# Patient Record
Sex: FEMALE | Race: WHITE | Employment: FULL TIME | URBAN - METROPOLITAN AREA
[De-identification: names, ages, dates, MRNs, and addresses within clinical notes are randomized per-mention and may not be internally consistent; named-entity substitution may affect disease eponyms.]

---

## 2017-01-04 ENCOUNTER — OFFICE VISIT (OUTPATIENT)
Dept: DERMATOLOGY | Facility: CLINIC | Age: 32
End: 2017-01-04

## 2017-01-04 VITALS — DIASTOLIC BLOOD PRESSURE: 79 MMHG | HEART RATE: 73 BPM | SYSTOLIC BLOOD PRESSURE: 119 MMHG

## 2017-01-04 DIAGNOSIS — L70.0 ACNE VULGARIS: Primary | ICD-10-CM

## 2017-01-04 RX ORDER — TRETINOIN 0.25 MG/G
CREAM TOPICAL
Qty: 45 G | Refills: 11 | Status: SHIPPED | OUTPATIENT
Start: 2017-01-04 | End: 2017-10-03

## 2017-01-04 RX ORDER — SPIRONOLACTONE 25 MG/1
50 TABLET ORAL DAILY
Qty: 60 TABLET | Refills: 2 | Status: SHIPPED | OUTPATIENT
Start: 2017-01-04 | End: 2017-01-31

## 2017-01-04 ASSESSMENT — PAIN SCALES - GENERAL: PAINLEVEL: NO PAIN (0)

## 2017-01-04 NOTE — PROGRESS NOTES
[facility] Dermatology Note    Dermatology Problem List:  1. Atypical nevi - right superior breast, right medial thigh s/p biopsy 3/31/15  2. Family history of melanoma in the patient's mother  3. Acne vulgaris  -tretinoin 0.025% cream, spironolactone 25 MG - initiated 10/4/16, azelaic Acid 15% gel    CC:   Chief Complaint   Patient presents with     Derm Problem     Acne. Notes improvement with the spironolactone.     Date of Service: Jan 4, 2017    History of Present Illness:  Ms. Quiana Shepherd is a 31 year old female with a family history of melanoma in her mother presents for a follow up for acne. The patient was last seen on 10/4/16 when she started tretinoin 0.025% cream, spironolactone 25 mg, and continued azelaic acid 15% gel. Today the patient reports that there has been improvement with her acne due to the spironolactone. She also reports the tretinoin 0.025% cream is going well. She did have some red irritation, but it is manageable with facial moisturizer. She is currently having a good week and notes that the acne is improving with very minimal deep lesions on the central forehead, but believes that it could be better. She notes that she traveled to [place] over the holidays and got very sick with gastroenteritis. She states that she probably should have stopped the spironolactone, but she did not. She denies any breast tenderness or intermenstrual spotting. She notes that sometimes she feels a little light headedness, but notes that it is probably due to not taking in a lot of fluids. The patient will be traveling to [place] in a few weeks and will be scuba diving. She has a few questions about medication effects of this. The patient reports no other lesions of concern at this time.    Otherwise, the patient reports no painful, bleeding, nonhealing, or pruritic lesions, and denies new or changing moles.    Past Medical History:   Patient Active Problem List   Diagnosis     Family  history of melanoma     History reviewed. No pertinent past medical history.    Past Surgical History   Procedure Laterality Date     Mohs micrographic procedure       Social History:  The patient admits to using tanning beds when she was younger. 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    Family History:  There is a family history of melanoma in the patient's mother. Most of the melanoma in the patient's mother has been found within the last few years.    Medications:  Current Outpatient Prescriptions   Medication Sig Dispense Refill     tretinoin (RETIN-A) 0.025 % cream Apply a pea sized amount to a clean and dry face nightly. 45 g 3     spironolactone (ALDACTONE) 25 MG tablet Take 2 tablets (50 mg) by mouth daily 60 tablet 2     Azelaic Acid 15 % gel Apply 0.5 inches topically 2 times daily Massage thin film gently into afected areas morning and evening. 50 g 11     levonorgestrel (MIRENA) 20 MCG/24HR IUD 1 each by Intrauterine route once       Allergies:  No Known Allergies    Review of Systems:  - Skin: As above in HPI. No additional skin concerns.    Physical exam:  Vitals: There were no vitals taken for this visit.  GEN: This is a well developed, well-nourished female in no acute distress, in a pleasant mood.      SKIN: Acne exam, which includes the face, neck, upper central chest, and upper central back was performed.  - Forehead is clear  - Shallow smooth sided scars on the bilateral cheeks and chin, no comedones are noted  - Post inflammatory macules and one resolving inflammatory papules on the right cheek  - Chin has a solitary inflammatory papule and small resolving comedones  - Upper chest and upper back is clear  - No other lesions of concern on areas examined.     Impression/Plan:  1. Family history of melanoma in the patient's mother.    2. Personal history of dysplastic nevi    3. Acne vulgaris, comedonal and inflammatory with hormonal accentuation  - Continue tretinoin 0.025% cream - apply a pea  size amount to a clean and dry face nightly.   - Continue spironolactone 25 MG - take 2 tablets (50mg) by mouth daily.   - Continue azelaic acid 15% gel - apply 0.5 inches topically 2 times daily  - Continue benzoyl peroxide face wash  - Consider increasing tretinoin strength and/or spironolactone dose if no improvement is noted, the patient is feeling better in a few weeks, and the weather becomes more humid.        Follow-up in 3 months, earlier for new or chaning lesions..     Staff Involved:  Staff Only    Scribe Disclosure:   I, Reyna Abebe, am serving as a scribe to document services personally performed by Dr. Paolo Arenas, based on data collection and the provider's statements to me.

## 2017-01-04 NOTE — Clinical Note
1/4/2017       RE: Quiana Shepherd  [address] [place] [id number]     Dear Colleague,    Thank you for referring your patient, Quiana Shepherd, to the [facility] DERMATOLOGY at [facility]. Please see a copy of my visit note below.    [facility] Dermatology Note    Dermatology Problem List:  1. Atypical nevi - right superior breast, right medial thigh s/p biopsy 3/31/15  2. Family history of melanoma in the patient's mother  3. Acne vulgaris  -tretinoin 0.025% cream, spironolactone 25 MG - initiated 10/4/16, azelaic Acid 15% gel    CC:   Chief Complaint   Patient presents with     Derm Problem     Acne. Notes improvement with the spironolactone.     Date of Service: Jan 4, 2017    History of Present Illness:  Ms. Quiana Shepherd is a 31 year old female with a family history of melanoma in her mother presents for a follow up for acne. The patient was last seen on 10/4/16 when she started tretinoin 0.025% cream, spironolactone 25 mg, and continued azelaic acid 15% gel. Today the patient reports that there has been improvement with her acne due to the spironolactone. She also reports the tretinoin 0.025% cream is going well. She did have some red irritation, but it is manageable with facial moisturizer. She is currently having a good week and notes that the acne is improving with very minimal deep lesions on the central forehead, but believes that it could be better. She notes that she traveled to [place] over the holidays and got very sick with gastroenteritis. She states that she probably should have stopped the spironolactone, but she did not. She denies any breast tenderness or intermenstrual spotting. She notes that sometimes she feels a little light headedness, but notes that it is probably due to not taking in a lot of fluids. The patient will be traveling to [place] in a few weeks and will be scuba diving. She has a few questions about medication effects of  this. The patient reports no other lesions of concern at this time.    Otherwise, the patient reports no painful, bleeding, nonhealing, or pruritic lesions, and denies new or changing moles.    Past Medical History:   Patient Active Problem List   Diagnosis     Family history of melanoma     History reviewed. No pertinent past medical history.    Past Surgical History   Procedure Laterality Date     Mohs micrographic procedure       Social History:  The patient admits to using tanning beds when she was younger. 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    Family History:  There is a family history of melanoma in the patient's mother. Most of the melanoma in the patient's mother has been found within the last few years.    Medications:  Current Outpatient Prescriptions   Medication Sig Dispense Refill     tretinoin (RETIN-A) 0.025 % cream Apply a pea sized amount to a clean and dry face nightly. 45 g 3     spironolactone (ALDACTONE) 25 MG tablet Take 2 tablets (50 mg) by mouth daily 60 tablet 2     Azelaic Acid 15 % gel Apply 0.5 inches topically 2 times daily Massage thin film gently into afected areas morning and evening. 50 g 11     levonorgestrel (MIRENA) 20 MCG/24HR IUD 1 each by Intrauterine route once       Allergies:  No Known Allergies    Review of Systems:  - Skin: As above in HPI. No additional skin concerns.    Physical exam:  Vitals: There were no vitals taken for this visit.  GEN: This is a well developed, well-nourished female in no acute distress, in a pleasant mood.      SKIN: Acne exam, which includes the face, neck, upper central chest, and upper central back was performed.  - Forehead is clear  - Shallow smooth sided scars on the bilateral cheeks and chin, no comedones are noted  - Post inflammatory macules and one resolving inflammatory papules on the right cheek  - Chin has a solitary inflammatory papule and small resolving comedones  - Upper chest and upper back is clear  - No other lesions of  concern on areas examined.     Impression/Plan:  1. Family history of melanoma in the patient's mother.    2. Personal history of dysplastic nevi    3. Acne vulgaris, comedonal and inflammatory with hormonal accentuation  - Continue tretinoin 0.025% cream - apply a pea size amount to a clean and dry face nightly.   - Continue spironolactone 25 MG - take 2 tablets (50mg) by mouth daily.   - Continue azelaic acid 15% gel - apply 0.5 inches topically 2 times daily  - Continue benzoyl peroxide face wash  - Consider increasing tretinoin strength and/or spironolactone dose if no improvement is noted, the patient is feeling better in a few weeks, and the weather becomes more humid.        Follow-up in 3 months, earlier for new or chaning lesions..     Staff Involved:  Staff Only    Scribe Disclosure:   I, Reyna Abebe, am serving as a scribe to document services personally performed by Dr. Paolo Arenas, based on data collection and the provider's statements to me.     Again, thank you for allowing me to participate in the care of your patient.      Sincerely,    Paolo Arenas MD

## 2017-01-04 NOTE — NURSING NOTE
Dermatology Rooming Note    Quiana Shepherd's goals for this visit include:   Chief Complaint   Patient presents with     Derm Problem     Acne. Notes improvement with the spironolactone.     Lety Gan, CMA

## 2017-01-30 ENCOUNTER — MYC MEDICAL ADVICE (OUTPATIENT)
Dept: DERMATOLOGY | Facility: CLINIC | Age: 32
End: 2017-01-30

## 2017-01-30 DIAGNOSIS — L70.0 ACNE VULGARIS: Primary | ICD-10-CM

## 2017-01-31 RX ORDER — SPIRONOLACTONE 25 MG/1
100 TABLET ORAL DAILY
Qty: 120 TABLET | Refills: 2 | Status: SHIPPED | OUTPATIENT
Start: 2017-01-31 | End: 2017-04-04

## 2017-04-04 ENCOUNTER — OFFICE VISIT (OUTPATIENT)
Dept: DERMATOLOGY | Facility: CLINIC | Age: 32
End: 2017-04-04

## 2017-04-04 VITALS — SYSTOLIC BLOOD PRESSURE: 123 MMHG | HEART RATE: 85 BPM | DIASTOLIC BLOOD PRESSURE: 86 MMHG

## 2017-04-04 DIAGNOSIS — L70.0 ACNE VULGARIS: ICD-10-CM

## 2017-04-04 RX ORDER — SPIRONOLACTONE 25 MG/1
100 TABLET ORAL DAILY
Qty: 120 TABLET | Refills: 5 | Status: SHIPPED | OUTPATIENT
Start: 2017-04-04 | End: 2018-10-26 | Stop reason: DRUGHIGH

## 2017-04-04 ASSESSMENT — PAIN SCALES - GENERAL: PAINLEVEL: NO PAIN (0)

## 2017-04-04 NOTE — MR AVS SNAPSHOT
After Visit Summary   4/4/2017    Quiana Shepherd    MRN: [id number]           Patient Information     Date Of Birth          1985        Visit Information        Provider Department      4/4/2017 8:30 AM Paolo Arenas MD [facility] Dermatology        Today's Diagnoses     Acne vulgaris          Care Instructions    Avoid Bactrim when on spironolactone.         Follow-ups after your visit        Who to contact     Please call your clinic at [phone] to:    Ask questions about your health    Make or cancel appointments    Discuss your medicines    Learn about your test results    Speak to your doctor   If you have compliments or concerns about an experience at your clinic, or if you wish to file a complaint, please contact [facility] Patient Relations at [phone] or email us at [email or web address]         Additional Information About Your Visit        MyChart Information     [employer] gives you secure access to your electronic health record. If you see a primary care provider, you can also send messages to your care team and make appointments. If you have questions, please call your primary care clinic.  If you do not have a primary care provider, please call [phone] and they will assist you.      [employer] is an electronic gateway that provides easy, online access to your medical records. With [employer], you can request a clinic appointment, read your test results, renew a prescription or communicate with your care team.     To access your existing account, please contact your [facility] or call [phone] for assistance.        Care EveryWhere ID     This is your Care EveryWhere ID. This could be used by other organizations to access your [place] medical records  [id number]        Your Vitals Were     Pulse                   85            Blood Pressure from Last 3 Encounters:   No data found for BP    Weight from  Last 3 Encounters:   No data found for Wt              Today, you had the following     No orders found for display         Where to get your medicines      These medications were sent to [facility] [place] [facility] [place], [place] - [address]  [address], [facility] [id number]    Hours:  TRANSPLANT PHONE NUMBER [phone] Phone:  [phone]     spironolactone 25 MG tablet          Primary Care Provider    No Pcp Confirmed       No address on file        Thank you!     Thank you for choosing [facility] DERMATOLOGY  for your care. Our goal is always to provide you with excellent care. Hearing back from our patients is one way we can continue to improve our services. Please take a few minutes to complete the written survey that you may receive in the mail after your visit with us. Thank you!             Your Updated Medication List - Protect others around you: Learn how to safely use, store and throw away your medicines at www.disposemymeds.org.          This list is accurate as of: 4/4/17 11:59 PM.  Always use your most recent med list.                   Brand Name Dispense Instructions for use    Azelaic Acid 15 % gel     50 g    Apply 0.5 inches topically 2 times daily Massage thin film gently into afected areas morning and evening.       levonorgestrel 20 MCG/24HR IUD    MIRENA     1 each by Intrauterine route once       spironolactone 25 MG tablet    ALDACTONE    120 tablet    Take 4 tablets (100 mg) by mouth daily       tretinoin 0.025 % cream    RETIN-A    45 g    Apply a pea sized amount to a clean and dry face nightly.

## 2017-04-04 NOTE — LETTER
4/4/2017       RE: Quiana Shepherd  [address] [place] [id number]     Dear Colleague,    Thank you for referring your patient, Quiana Shepherd, to the [facility] DERMATOLOGY at [facility]. Please see a copy of my visit note below.    [facility] Dermatology Note    Dermatology Problem List:  1. Atypical nevi - right superior breast, right medial thigh s/p biopsy 3/31/15  2. Family history of melanoma in the patient's mother  3. Acne vulgaris  -tretinoin 0.025% cream, spironolactone, azelaic acid 15% gel, BPO facial wash    CC:   Chief Complaint   Patient presents with     Derm Problem     Acne. Quiana notes improvement with her acne. She notes that she is still having in-grown hairs.     Date of Service: Apr 4, 2017    History of Present Illness:  Ms. Quiana Shepherd is a 31 year old female with a family history of melanoma in her mother presents for a follow up for acne. The patient was last seen on 1/4/17 when she continued spironolactone, azelaic acid 15% gel, and benzoyl peroxide face wash. Today the patient reports that her acne has improved greatly since the last visit. She had one larger acne lesion on the ride side of her face and she did have another in grown hair on her chin in the same area as she had one in the past. She denies light headedness, spotting in between intramenstrual cycle, or breast tenderness. She denies any dryness or redness with the topical medications. The patient reports no other lesions of concern at this time.    Otherwise, the patient reports no painful, bleeding, nonhealing, or pruritic lesions, and denies new or changing moles.    Past Medical History:   Patient Active Problem List   Diagnosis     Family history of melanoma     No past medical history on file.    Past Surgical History:   Procedure Laterality Date     MOHS MICROGRAPHIC PROCEDURE       Social History:  The patient admits to using tanning beds when she was  younger. The patient works in behavior counseling.    Family History:  There is a family history of melanoma in the patient's mother. Most of the melanoma in the patient's mother has been found within the last few years.    Medications:  Current Outpatient Prescriptions   Medication Sig Dispense Refill     spironolactone (ALDACTONE) 25 MG tablet Take 4 tablets (100 mg) by mouth daily 120 tablet 2     tretinoin (RETIN-A) 0.025 % cream Apply a pea sized amount to a clean and dry face nightly. 45 g 11     Azelaic Acid 15 % gel Apply 0.5 inches topically 2 times daily Massage thin film gently into afected areas morning and evening. 50 g 11     levonorgestrel (MIRENA) 20 MCG/24HR IUD 1 each by Intrauterine route once       Allergies:  No Known Allergies    Review of Systems:  - Skin: As above in HPI. No additional skin concerns.    Physical exam:  Vitals: /86 (BP Location: Right arm)  Pulse 85  GEN: This is a well developed, well-nourished female in no acute distress, in a pleasant mood.      SKIN: Acne exam, which includes the face, neck, upper central chest, and upper central back was performed.  - Resolving inflammatory papule on the right preauricular cheek  - One scar on right jaw line  - One resolving inflammatory papule on the left chin  - Upper chest and back are clear  - No other lesions of concern on areas examined.     Impression/Plan:  1. Family history of melanoma in the patient's mother.    2. Personal history of atypical nevi.     3. Acne vulgaris, comedonal and inflammatory with hormonal accentuation  - Continue tretinoin 0.025% cream - apply a pea size amount to a clean and dry face nightly.   - Continue spironolactone - take 100 mg by mouth daily. Discussion that if the patient becomes pregnant to stop the medication. Discussion to not take Bactrim when on this medication.   - Continue azelaic acid 15% gel - apply 0.5 inches topically 2 times daily  - Continue benzoyl peroxide face  wash      Follow-up in 6 months, earlier for new or chaning lesions..     Staff Involved:  Staff Only    Scribe Disclosure:   I, Reyna Abebe, am serving as a scribe to document services personally performed by Dr. Paolo Arenas, based on data collection and the provider's statements to me.     Again, thank you for allowing me to participate in the care of your patient.      Sincerely,    Paolo Arenas MD

## 2017-04-04 NOTE — NURSING NOTE
Dermatology Rooming Note    Quiana Cora's goals for this visit include:   Chief Complaint   Patient presents with     Derm Problem     Acne. Quiana notes improvement with her acne. She notes that she is still having in-grown hairs.     Lety Gan, CMA

## 2017-08-19 ENCOUNTER — HEALTH MAINTENANCE LETTER (OUTPATIENT)
Age: 32
End: 2017-08-19

## 2017-10-03 ENCOUNTER — OFFICE VISIT (OUTPATIENT)
Dept: DERMATOLOGY | Facility: CLINIC | Age: 32
End: 2017-10-03

## 2017-10-03 DIAGNOSIS — L70.0 ACNE VULGARIS: ICD-10-CM

## 2017-10-03 RX ORDER — AZELAIC ACID 0.15 G/G
0.5 GEL TOPICAL 2 TIMES DAILY
Qty: 50 G | Refills: 11 | Status: SHIPPED | OUTPATIENT
Start: 2017-10-03 | End: 2019-07-29

## 2017-10-03 RX ORDER — SPIRONOLACTONE 50 MG/1
TABLET, FILM COATED ORAL
Qty: 180 TABLET | Refills: 3 | Status: SHIPPED | OUTPATIENT
Start: 2017-10-03 | End: 2018-03-30

## 2017-10-03 RX ORDER — TRETINOIN 0.25 MG/G
CREAM TOPICAL
Qty: 45 G | Refills: 11 | Status: SHIPPED | OUTPATIENT
Start: 2017-10-03 | End: 2018-10-26 | Stop reason: DRUGHIGH

## 2017-10-03 RX ORDER — SPIRONOLACTONE 25 MG/1
100 TABLET ORAL DAILY
Qty: 120 TABLET | Refills: 5 | Status: CANCELLED | OUTPATIENT
Start: 2017-10-03

## 2017-10-03 ASSESSMENT — PAIN SCALES - GENERAL: PAINLEVEL: NO PAIN (0)

## 2017-10-03 NOTE — LETTER
10/3/2017       RE: Quiana Shepherd  [address] [place] [id number]     Dear Colleague,    Thank you for referring your patient, Quiana Shepherd, to the [facility] Please see a copy of my visit note below.    CHIEF COMPLAINT:  Follow up on acne.      HISTORY OF PRESENT ILLNESS:  Quiana is a very pleasant 32-year-old female who has a longstanding history of acne vulgaris with a hormonal component.  She has been using the Mirena IUD for the last 6 years and noticed her acne flare up significantly approximately 1 year ago.  She last saw Dr. Paolo Arenas in our clinic on 04/04/2017, at which time she continued spironolactone 100 mg daily as well as tretinoin and azelaic acid.  Today, she reports that her acne is very well-controlled, but she continues to have increased terminal hair growth on her chin which is modestly but not completely improved by spironolactone.  She is wondering if there is anything additional she can do for this.      REVIEW OF SYSTEMS:  No recent fevers or chills.  No breast tenderness or significant muscle cramping on spironolactone.      PHYSICAL EXAMINATION:   GENERAL:  This is a well-appearing, well-nourished female with a normal mood and affect who is oriented x3.   SKIN:  A cutaneous exam of the head, neck and bilateral upper extremities was performed.  On the mid cheek, there is a firm, flesh-toned, 3 mm dermal to subcutaneous papule which on dermoscopy does not demonstrate features of sebaceous hyperplasia.  Rare pink acneiform 2 mm papules are also present on her cheeks.      ASSESSMENT AND PLAN:   1.  Acne vulgaris with a hormonal component (likely exacerbated by Mirena IUD).  Today we will increase her spironolactone to 150 mg daily, primarily due to her concerns regarding terminal hair growth.  We discussed potential side effects of spironolactone, the importance of not getting pregnant while taking it and that she can  go back down to 100 mg daily if she is noticing side effects.  She will also continue tretinoin 0.025% cream at bedtime as well as azelaic acid 15% gel in the morning.   2.  Mild increased terminal hair growth on the chin.  As noted above, she will increase the spironolactone to 150 mg daily.   3.  She will follow up in our clinic in 3 months' time.       Cyrus Posey MD  Dermatology Attending

## 2017-10-03 NOTE — MR AVS SNAPSHOT
After Visit Summary   10/3/2017    Quiana Shepherd    MRN: [id number]           Patient Information     Date Of Birth          1985        Visit Information        Provider Department      10/3/2017 4:30 PM Cyrus Posey MD [facility] Dermatology        Today's Diagnoses     Acne vulgaris           Follow-ups after your visit        Who to contact     Please call your clinic at [phone] to:    Ask questions about your health    Make or cancel appointments    Discuss your medicines    Learn about your test results    Speak to your doctor   If you have compliments or concerns about an experience at your clinic, or if you wish to file a complaint, please contact [facility] Patient Relations at [phone] or email us at [email or web address]         Additional Information About Your Visit        MyChart Information     [employer] gives you secure access to your electronic health record. If you see a primary care provider, you can also send messages to your care team and make appointments. If you have questions, please call your primary care clinic.  If you do not have a primary care provider, please call [phone] and they will assist you.      [employer] is an electronic gateway that provides easy, online access to your medical records. With [employer], you can request a clinic appointment, read your test results, renew a prescription or communicate with your care team.     To access your existing account, please contact your [facility] or call [phone] for assistance.        Care EveryWhere ID     This is your Care EveryWhere ID. This could be used by other organizations to access your [place] medical records  [id number]         Blood Pressure from Last 3 Encounters:   04/04/17 123/86   01/04/17 119/79    Weight from Last 3 Encounters:   No data found for Wt              Today, you had the following     No orders found for  display         Today's Medication Changes          These changes are accurate as of: 10/3/17 11:59 PM.  If you have any questions, ask your nurse or doctor.               These medicines have changed or have updated prescriptions.        Dose/Directions    * spironolactone 25 MG tablet   Commonly known as:  ALDACTONE   This may have changed:  Another medication with the same name was added. Make sure you understand how and when to take each.   Used for:  Acne vulgaris   Changed by:  Paolo Arenas MD        Dose:  100 mg   Take 4 tablets (100 mg) by mouth daily   Quantity:  120 tablet   Refills:  5       * spironolactone 50 MG tablet   Commonly known as:  ALDACTONE   This may have changed:  You were already taking a medication with the same name, and this prescription was added. Make sure you understand how and when to take each.   Used for:  Acne vulgaris   Changed by:  Cyrus Posey MD        3 pills once a day   Quantity:  180 tablet   Refills:  3       * Notice:  This list has 2 medication(s) that are the same as other medications prescribed for you. Read the directions carefully, and ask your doctor or other care provider to review them with you.         Where to get your medicines      These medications were sent to [address] [address]    Hours:  TRANSPLANT PHONE NUMBER [phone] Phone:  [phone]     Azelaic Acid 15 % gel    spironolactone 50 MG tablet    tretinoin 0.025 % cream                Primary Care Provider    None Specified       No primary provider on file.        Equal Access to Services     REINA SPEARS : Dmitri Dyer, eladia martell, qarishabh kaaldevin pickering. So [facility] [phone].    ATENCIÓN: Si habla español, tiene a prakash disposición servicios gratuitos de asistencia lingüística. Llame al [phone].    We  comply with applicable federal civil rights laws and [place] laws. We do not discriminate on the basis of race, color, national origin, age, disability, sex, sexual orientation, or gender identity.            Thank you!     Thank you for choosing [facility] DERMATOLOGY  for your care. Our goal is always to provide you with excellent care. Hearing back from our patients is one way we can continue to improve our services. Please take a few minutes to complete the written survey that you may receive in the mail after your visit with us. Thank you!             Your Updated Medication List - Protect others around you: Learn how to safely use, store and throw away your medicines at www.disposemymeds.org.          This list is accurate as of: 10/3/17 11:59 PM.  Always use your most recent med list.                   Brand Name Dispense Instructions for use Diagnosis    Azelaic Acid 15 % gel     50 g    Apply 0.5 inches topically 2 times daily Massage thin film gently into afected areas morning and evening.    Acne vulgaris       levonorgestrel 20 MCG/24HR IUD    MIRENA     1 each by Intrauterine route once        * spironolactone 25 MG tablet    ALDACTONE    120 tablet    Take 4 tablets (100 mg) by mouth daily    Acne vulgaris       * spironolactone 50 MG tablet    ALDACTONE    180 tablet    3 pills once a day    Acne vulgaris       tretinoin 0.025 % cream    RETIN-A    45 g    Apply a pea sized amount to a clean and dry face nightly.    Acne vulgaris       * Notice:  This list has 2 medication(s) that are the same as other medications prescribed for you. Read the directions carefully, and ask your doctor or other care provider to review them with you.

## 2017-10-03 NOTE — NURSING NOTE
Dermatology Rooming Note    Quianateagan Shepherd's goals for this visit include:   Chief Complaint   Patient presents with     Derm Problem     Acne. Quiana states that her acne is doing well since her last visit.     Lety Gan, CMA

## 2017-10-03 NOTE — PROGRESS NOTES
CHIEF COMPLAINT:  Follow up on acne.      HISTORY OF PRESENT ILLNESS:  Quiana is a very pleasant 32-year-old female who has a longstanding history of acne vulgaris with a hormonal component.  She has been using the Mirena IUD for the last 6 years and noticed her acne flare up significantly approximately 1 year ago.  She last saw Dr. Paolo Arenas in our clinic on 04/04/2017, at which time she continued spironolactone 100 mg daily as well as tretinoin and azelaic acid.  Today, she reports that her acne is very well-controlled, but she continues to have increased terminal hair growth on her chin which is modestly but not completely improved by spironolactone.  She is wondering if there is anything additional she can do for this.      REVIEW OF SYSTEMS:  No recent fevers or chills.  No breast tenderness or significant muscle cramping on spironolactone.      PHYSICAL EXAMINATION:   GENERAL:  This is a well-appearing, well-nourished female with a normal mood and affect who is oriented x3.   SKIN:  A cutaneous exam of the head, neck and bilateral upper extremities was performed.  On the mid cheek, there is a firm, flesh-toned, 3 mm dermal to subcutaneous papule which on dermoscopy does not demonstrate features of sebaceous hyperplasia.  Rare pink acneiform 2 mm papules are also present on her cheeks.      ASSESSMENT AND PLAN:   1.  Acne vulgaris with a hormonal component (likely exacerbated by Mirena IUD).  Today we will increase her spironolactone to 150 mg daily, primarily due to her concerns regarding terminal hair growth.  We discussed potential side effects of spironolactone, the importance of not getting pregnant while taking it and that she can go back down to 100 mg daily if she is noticing side effects.  She will also continue tretinoin 0.025% cream at bedtime as well as azelaic acid 15% gel in the morning.   2.  Mild increased terminal hair growth on the chin.  As noted above, she will increase the spironolactone  to 150 mg daily.   3.  She will follow up in our clinic in 3 months' time.       Cyrus Posey MD  Dermatology Attending

## 2017-10-26 PROBLEM — L70.0 ACNE VULGARIS: Status: ACTIVE | Noted: 2017-10-26

## 2018-03-30 ENCOUNTER — OFFICE VISIT (OUTPATIENT)
Dept: DERMATOLOGY | Facility: CLINIC | Age: 33
End: 2018-03-30
Payer: COMMERCIAL

## 2018-03-30 VITALS — DIASTOLIC BLOOD PRESSURE: 70 MMHG | SYSTOLIC BLOOD PRESSURE: 118 MMHG

## 2018-03-30 DIAGNOSIS — D48.5 NEOPLASM OF UNCERTAIN BEHAVIOR OF SKIN: Primary | ICD-10-CM

## 2018-03-30 DIAGNOSIS — L70.0 ACNE VULGARIS: ICD-10-CM

## 2018-03-30 RX ORDER — SPIRONOLACTONE 50 MG/1
TABLET, FILM COATED ORAL
Qty: 180 TABLET | Refills: 3 | Status: SHIPPED | OUTPATIENT
Start: 2018-03-30 | End: 2018-10-26 | Stop reason: DRUGHIGH

## 2018-03-30 ASSESSMENT — PAIN SCALES - GENERAL
PAINLEVEL: NO PAIN (0)
PAINLEVEL: MILD PAIN (2)

## 2018-03-30 NOTE — MR AVS SNAPSHOT
After Visit Summary   3/30/2018    Quiana Shepherd    MRN: [id number]           Patient Information     Date Of Birth          1985        Visit Information        Provider Department      3/30/2018 8:30 AM Delfina Rowley PA-C [facility] Dermatology        Today's Diagnoses     Neoplasm of uncertain behavior of skin    -  1    Acne vulgaris          Care Instructions    Wound Care After a Biopsy    What is a skin biopsy?  A skin biopsy allows the doctor to examine a very small piece of tissue under the microscope to determine the diagnosis and the best treatment for the skin condition. A local anesthetic (numbing medicine)  is injected with a very small needle into the skin area to be tested. A small piece of skin is taken from the area. Sometimes a suture (stitch) is used.     What are the risks of a skin biopsy?  I will experience scar, bleeding, swelling, pain, crusting and redness. I may experience incomplete removal or recurrence. Risks of this procedure are excessive bleeding, bruising, infection, nerve damage, numbness, thick (hypertrophic or keloidal) scar and non-diagnostic biopsy.    How should I care for my wound for the first 24 hours?    Keep the wound dry and covered for 24 hours    If it bleeds, hold direct pressure on the area for 15 minutes. If bleeding does not stop then go to the emergency room    Avoid strenuous exercise the first 1-2 days or as your doctor instructs you    How should I care for the wound after 24 hours?    After 24 hours, remove the bandage    You may bathe or shower as normal    If you had a scalp biopsy, you can shampoo as usual and can use shower water to clean the biopsy site daily    Clean the wound twice a day with gentle soap and water    Do not scrub, be gentle    Apply white petroleum/Vaseline after cleaning the wound with a cotton swab or a clean finger, and keep the site covered with a Bandaid /bandage. Bandages are not necessary with a scalp  biopsy    If you are unable to cover the site with a Bandaid /bandage, re-apply ointment 2-3 times a day to keep the site moist. Moisture will help with healing    Avoid strenuous activity for first 1-2 days    Avoid lakes, rivers, pools, and oceans until the stitches are removed or the site is healed    How do I clean my wound?    Wash hands thoroughly with soap or use hand  before all wound care    Clean the wound with gentle soap and water    Apply white petroleum/Vaseline  to wound after it is clean    Replace the Bandaid /bandage to keep the wound covered for the first few days or as instructed by your doctor    If you had a scalp biopsy, warm shower water to the area on a daily basis should suffice    What should I use to clean my wound?     Cotton-tipped applicators (Qtips )    White petroleum jelly (Vaseline ). Use a clean new container and use Q-tips to apply.    Bandaids   as needed    Gentle soap     How should I care for my wound long term?    Do not get your wound dirty    Keep up with wound care for one week or until the area is healed.    A small scab will form and fall off by itself when the area is completely healed. The area will be red and will become pink in color as it heals. Sun protection is very important for how your scar will turn out. Sunscreen with an SPF 30 or greater is recommended once the area is healed.    You should have some soreness but it should be mild and slowly go away over several days. Talk to your doctor about using tylenol for pain,    When should I call my doctor?  If you have increased:     Pain or swelling    Pus or drainage (clear or slightly yellow drainage is ok)    Temperature over 100F    Spreading redness or warmth around wound    When will I hear about my results?  The biopsy results can take 2-3 weeks to come back. The clinic will call you with the results, send you a [employer] message, or have you schedule a follow-up clinic or phone time to discuss the  results. Contact our clinics if you do not hear from us in 3 weeks.     Who should I call with questions?    [facility]: [phone]     [facility]: [phone]    For urgent needs outside of business hours call the [facility] at [phone] and ask for the dermatology resident on call              Follow-ups after your visit        Follow-up notes from your care team     Return in about 6 months (around 9/30/2018).      Who to contact     Please call your clinic at [phone] to:    Ask questions about your health    Make or cancel appointments    Discuss your medicines    Learn about your test results    Speak to your doctor            Additional Information About Your Visit        [employer] Information     [employer] gives you secure access to your electronic health record. If you see a primary care provider, you can also send messages to your care team and make appointments. If you have questions, please call your primary care clinic.  If you do not have a primary care provider, please call [phone] and they will assist you.      [employer] is an electronic gateway that provides easy, online access to your medical records. With [employer], you can request a clinic appointment, read your test results, renew a prescription or communicate with your care team.     To access your existing account, please contact your [facility] or call [phone] for assistance.        Care EveryWhere ID     This is your Care EveryWhere ID. This could be used by other organizations to access your [place] medical records  [id number]         Blood Pressure from Last 3 Encounters:   03/30/18 118/70   04/04/17 123/86   01/04/17 119/79    Weight from Last 3 Encounters:   No data found for Wt              We Performed the Following     BIOPSY SKIN/SUBQ/MUC MEM, SINGLE LESION     Dermatological path order and indications          Where  to get your medicines      These medications were sent to [place], [place] - [address]  [address], [facility] [address]    Hours:  TRANSPLANT PHONE NUMBER [phone] Phone:  [phone]     spironolactone 50 MG tablet          Primary Care Provider    None Specified       No primary provider on file.        Equal Access to Services     [facility]: Hadii aad ku hadasho Soomaali, waaxda luqadaha, qaybta kaalmada adeegyada, waxay idiin hayaan adelynn diegodelaneyflako lamario alberto . So [facility] [phone].    ATENCIÓN: Si habla español, tiene a prakash disposición servicios gratuitos de asistencia lingüística. Paola al [phone].    We comply with applicable federal civil rights laws and [place] laws. We do not discriminate on the basis of race, color, national origin, age, disability, sex, sexual orientation, or gender identity.            Thank you!     Thank you for choosing [facility] DERMATOLOGY  for your care. Our goal is always to provide you with excellent care. Hearing back from our patients is one way we can continue to improve our services. Please take a few minutes to complete the written survey that you may receive in the mail after your visit with us. Thank you!             Your Updated Medication List - Protect others around you: Learn how to safely use, store and throw away your medicines at www.disposemymeds.org.          This list is accurate as of 3/30/18  8:59 AM.  Always use your most recent med list.                   Brand Name Dispense Instructions for use Diagnosis    Azelaic Acid 15 % gel     50 g    Apply 0.5 inches topically 2 times daily Massage thin film gently into afected areas morning and evening.    Acne vulgaris       levonorgestrel 20 MCG/24HR IUD    MIRENA     1 each by Intrauterine route once        * spironolactone 25 MG tablet    ALDACTONE    120 tablet    Take 4 tablets (100 mg) by mouth daily    Acne vulgaris       *  spironolactone 50 MG tablet    ALDACTONE    180 tablet    3 pills once a day    Acne vulgaris       tretinoin 0.025 % cream    RETIN-A    45 g    Apply a pea sized amount to a clean and dry face nightly.    Acne vulgaris       * Notice:  This list has 2 medication(s) that are the same as other medications prescribed for you. Read the directions carefully, and ask your doctor or other care provider to review them with you.

## 2018-03-30 NOTE — LETTER
3/30/2018       RE: Quiana Shepherd  [address] [facility] [place] [address]     Dear Colleague,    Thank you for referring your patient, Quiana Shepherd, to the [facility] DERMATOLOGY at [facility]. Please see a copy of my visit note below.    [facility] Dermatology Note      Dermatology Problem List:  1.Acne Vulgaris with hormonal component and secondary hirsutism from use of spironolactone  -current tx: tretinoin 0.025% cream QHS, azelaic acid 15% gel BID, spironolactone 150mg po QD  2. NUB - right shoulder s/p bx 3/30/18    CC:   Chief Complaint   Patient presents with     Skin Check     Quiana is here today for a skin check- has a spot of concern.      Acne     Flaco would also like to discuss her acne.        Encounter Date: Mar 30, 2018    History of Present Illness:  Ms. Quiana Shepherd is a 32 year old female who presents for an acne follow-up. She has been on tretinoin 0.025% cream nightly, Azelaic acid 15% BID and spironolactone 150mg po QD. She is tolerating this regimen well and denies side effects to the medications including breast tenderness, spotting, dizziness, dryness. Of note, her dose was increased at her last office visit with Dr. Posey on 10/3/17 due to the fact she had some growth of dark hair on the chin. She feels like the amount of dark hairs has decreased since her last visit, but she is still getting some. She plucks them. She still has numerous fine blonde hairs in the area.    Additionally, she is here for a concerning mole which never was a concerning spot in past skin exams, but over the past few weeks it started scabbing up and now has become itchy and painful. She wears sunscreen on a regular basis. Patient burns easily. She has no personal hx of skin cancer. However she says her mother has had numerous melanomas. Her Mom is on Humira. The patient denies painful, itching, tingling or bleeding lesions unless otherwise noted.      Past  Medical History:   Patient Active Problem List   Diagnosis     Family history of melanoma     Acne vulgaris     Past Medical History:   Diagnosis Date     Acne vulgaris 10/26/2017     History reviewed. No pertinent surgical history.    Social History:   The patient admits to use of tanning beds as a teenager for about 2 years.    Family History:  Patient's mother has had numerous melanomas (mom is on Humira)  No fhx of NMSCs.     Medications:  Current Outpatient Prescriptions   Medication Sig Dispense Refill     tretinoin (RETIN-A) 0.025 % cream Apply a pea sized amount to a clean and dry face nightly. 45 g 11     Azelaic Acid 15 % gel Apply 0.5 inches topically 2 times daily Massage thin film gently into afected areas morning and evening. 50 g 11     spironolactone (ALDACTONE) 50 MG tablet 3 pills once a day 180 tablet 3     levonorgestrel (MIRENA) 20 MCG/24HR IUD 1 each by Intrauterine route once       spironolactone (ALDACTONE) 25 MG tablet Take 4 tablets (100 mg) by mouth daily (Patient not taking: Reported on 3/30/2018) 120 tablet 5     No Known Allergies      Review of Systems:  -Constitutional: The patient denies fatigue, fevers, chills, unintended weight loss, and night sweats.  -Skin: As above in HPI. No additional skin concerns.  -GI: The patient denies nausea, vomiting, diarrhea or abdominal pain.  -: No changes in menstrual cycle, no breast tenderness, no change in urination  -CVS: no palpitations, dizziness      Physical exam:  Vitals: /70 (Cuff Size: Adult Regular)  GEN: This is a well developed, well-nourished female in no acute distress, in a pleasant mood.    SKIN: Waist-up skin, which includes the head/face, neck, both arms, chest, back, abdomen, digits and/or nails was examined.  -Very few acne lesions on the face, no scarring  -scattered terminal dark hairs on the lower aspect of the shin, patient has some mild erythema 2/2 plucking  -3mm irritated pink papule on the right shoulder  -No  other lesions of concern on areas examined.     Impression/Plan:  1. Acne vulgaris with hormonal component & hurtsuism    Continue with spironolactone 150mg po daily. Refilled today. Reminded patient she should not get pregnant while on the medication. She is tolerating it well.     Contiune with tretinoin 0.025% cream QHS and azelaic acid 15% gel BID    Discussed side effect of hurtsuism and spironolactone, and patient elected to continue at this dose for now. She has seen improvement in her dark hair growth, just not resolution. Briefly discussed increasing her dose to 200mg daily, but she declined this at this time.    Briefly mentioned laser hair removal/electrolysis as an option, patient to consider       2. Neoplasm of uncertain behavior on the right shoulder. The differential diagnosis includes inflamed congenital nevus vs other.     Shave biopsy:  After discussion of benefits and risks including but not limited to bleeding/bruising, pain/swelling, infection, scar, incomplete removal, nerve damage/numbness, recurrence, and non-diagnostic biopsy, written consent, verbal consent and photographs were obtained. Time-out was performed. The area was cleaned with isopropyl alcohol.  was injected to obtain adequate anesthesia of the lesion on the right shoulder. 1ml of 1% lidocaine was injected to obtain adequate anesthesia. A shave biopsy was performed. Hemostasis was achieved with aluminium chloride. Vaseline and a sterile dressing were applied. The patient tolerated the procedure and no complications were noted. The patient was provided with verbal and written post care instructions.      CC Dr. Lomax on close of this encounter.  Follow-up in 6 months, earlier for new or changing lesions.       Staff Involved:  Staff Only  All risks, benefits and alternatives were discussed with patient.  Patient is in agreement and understands the assessment and plan.  All questions were answered.    Delfina Rowley  CAROLYNE  [facility]: Phone: [phone], Fax: [phone]

## 2018-03-30 NOTE — PROGRESS NOTES
[facility] Dermatology Note      Dermatology Problem List:  1.Acne Vulgaris with hormonal component and secondary hirsutism from use of spironolactone  -current tx: tretinoin 0.025% cream QHS, azelaic acid 15% gel BID, spironolactone 150mg po QD  2. NUB - right shoulder s/p bx 3/30/18    CC:   Chief Complaint   Patient presents with     Skin Check     Quiana is here today for a skin check- has a spot of concern.      Acne     Flaco would also like to discuss her acne.        Encounter Date: Mar 30, 2018    History of Present Illness:  Ms. Quiana Shepherd is a 32 year old female who presents for an acne follow-up. She has been on tretinoin 0.025% cream nightly, Azelaic acid 15% BID and spironolactone 150mg po QD. She is tolerating this regimen well and denies side effects to the medications including breast tenderness, spotting, dizziness, dryness. Of note, her dose was increased at her last office visit with Dr. Posey on 10/3/17 due to the fact she had some growth of dark hair on the chin. She feels like the amount of dark hairs has decreased since her last visit, but she is still getting some. She plucks them. She still has numerous fine blonde hairs in the area.    Additionally, she is here for a concerning mole which never was a concerning spot in past skin exams, but over the past few weeks it started scabbing up and now has become itchy and painful. She wears sunscreen on a regular basis. Patient burns easily. She has no personal hx of skin cancer. However she says her mother has had numerous melanomas. Her Mom is on Humira. The patient denies painful, itching, tingling or bleeding lesions unless otherwise noted.      Past Medical History:   Patient Active Problem List   Diagnosis     Family history of melanoma     Acne vulgaris     Past Medical History:   Diagnosis Date     Acne vulgaris 10/26/2017     History reviewed. No pertinent surgical history.    Social History:   The patient admits to  use of tanning beds as a teenager for about 2 years.    Family History:  Patient's mother has had numerous melanomas (mom is on Humira)  No fhx of NMSCs.     Medications:  Current Outpatient Prescriptions   Medication Sig Dispense Refill     tretinoin (RETIN-A) 0.025 % cream Apply a pea sized amount to a clean and dry face nightly. 45 g 11     Azelaic Acid 15 % gel Apply 0.5 inches topically 2 times daily Massage thin film gently into afected areas morning and evening. 50 g 11     spironolactone (ALDACTONE) 50 MG tablet 3 pills once a day 180 tablet 3     levonorgestrel (MIRENA) 20 MCG/24HR IUD 1 each by Intrauterine route once       spironolactone (ALDACTONE) 25 MG tablet Take 4 tablets (100 mg) by mouth daily (Patient not taking: Reported on 3/30/2018) 120 tablet 5     No Known Allergies      Review of Systems:  -Constitutional: The patient denies fatigue, fevers, chills, unintended weight loss, and night sweats.  -Skin: As above in HPI. No additional skin concerns.  -GI: The patient denies nausea, vomiting, diarrhea or abdominal pain.  -: No changes in menstrual cycle, no breast tenderness, no change in urination  -CVS: no palpitations, dizziness      Physical exam:  Vitals: /70 (Cuff Size: Adult Regular)  GEN: This is a well developed, well-nourished female in no acute distress, in a pleasant mood.    SKIN: Waist-up skin, which includes the head/face, neck, both arms, chest, back, abdomen, digits and/or nails was examined.  -Very few acne lesions on the face, no scarring  -scattered terminal dark hairs on the lower aspect of the shin, patient has some mild erythema 2/2 plucking  -3mm irritated pink papule on the right shoulder  -No other lesions of concern on areas examined.     Impression/Plan:  1. Acne vulgaris with hormonal component & hurtsuism    Continue with spironolactone 150mg po daily. Refilled today. Reminded patient she should not get pregnant while on the medication. She is tolerating it  well.     Contiune with tretinoin 0.025% cream QHS and azelaic acid 15% gel BID    Discussed side effect of hurtsuism and spironolactone, and patient elected to continue at this dose for now. She has seen improvement in her dark hair growth, just not resolution. Briefly discussed increasing her dose to 200mg daily, but she declined this at this time.    Briefly mentioned laser hair removal/electrolysis as an option, patient to consider       2. Neoplasm of uncertain behavior on the right shoulder. The differential diagnosis includes inflamed congenital nevus vs other.     Shave biopsy:  After discussion of benefits and risks including but not limited to bleeding/bruising, pain/swelling, infection, scar, incomplete removal, nerve damage/numbness, recurrence, and non-diagnostic biopsy, written consent, verbal consent and photographs were obtained. Time-out was performed. The area was cleaned with isopropyl alcohol.  was injected to obtain adequate anesthesia of the lesion on the right shoulder. 1ml of 1% lidocaine was injected to obtain adequate anesthesia. A shave biopsy was performed. Hemostasis was achieved with aluminium chloride. Vaseline and a sterile dressing were applied. The patient tolerated the procedure and no complications were noted. The patient was provided with verbal and written post care instructions.      CC Dr. Lomax on close of this encounter.  Follow-up in 6 months, earlier for new or changing lesions.       Staff Involved:  Staff Only  All risks, benefits and alternatives were discussed with patient.  Patient is in agreement and understands the assessment and plan.  All questions were answered.    Delfina Rowley PA-C  [facility]: Phone: [phone], Fax: [phone]

## 2018-03-30 NOTE — NURSING NOTE
Lidocaine 1%  0.5mL once for one use, starting 3/30/2018 ending 3/30/2018,  2mL disp, R-0, injection  Injected by Kim Norwood MA

## 2018-03-30 NOTE — NURSING NOTE
Dermatology Rooming Note    Quiana Shepherd's goals for this visit include:   Chief Complaint   Patient presents with     Skin Check     Quiana is here today for a skin check- has a spot of concern.      Acne     Flaco would also like to discuss her acne.      Kim Norwood MA

## 2018-03-30 NOTE — PATIENT INSTRUCTIONS

## 2018-04-02 LAB — COPATH REPORT: NORMAL

## 2018-04-03 ENCOUNTER — TELEPHONE (OUTPATIENT)
Dept: DERMATOLOGY | Facility: CLINIC | Age: 33
End: 2018-04-03

## 2018-04-03 NOTE — TELEPHONE ENCOUNTER
Spoke with carolyne and informed her of path results. Advised that path results where Benign. She stated that she understood and will call if she has additional questions.     Giovanna Mitchell

## 2018-06-07 ENCOUNTER — OFFICE VISIT (OUTPATIENT)
Dept: ORTHOPEDICS | Facility: CLINIC | Age: 33
End: 2018-06-07
Payer: COMMERCIAL

## 2018-06-07 ENCOUNTER — RADIANT APPOINTMENT (OUTPATIENT)
Dept: GENERAL RADIOLOGY | Facility: CLINIC | Age: 33
End: 2018-06-07
Attending: PREVENTIVE MEDICINE
Payer: COMMERCIAL

## 2018-06-07 VITALS — SYSTOLIC BLOOD PRESSURE: 123 MMHG | DIASTOLIC BLOOD PRESSURE: 91 MMHG | HEART RATE: 71 BPM | HEIGHT: 67 IN

## 2018-06-07 DIAGNOSIS — M79.601 RIGHT ARM PAIN: ICD-10-CM

## 2018-06-07 DIAGNOSIS — S52.124A CLOSED NONDISPLACED FRACTURE OF HEAD OF RIGHT RADIUS, INITIAL ENCOUNTER: ICD-10-CM

## 2018-06-07 DIAGNOSIS — M79.601 RIGHT ARM PAIN: Primary | ICD-10-CM

## 2018-06-07 LAB — RADIOLOGIST FLAGS: NORMAL

## 2018-06-07 RX ORDER — DICLOFENAC SODIUM 75 MG/1
75 TABLET, DELAYED RELEASE ORAL 2 TIMES DAILY PRN
Qty: 30 TABLET | Refills: 1 | Status: SHIPPED | OUTPATIENT
Start: 2018-06-07 | End: 2021-04-26
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
## 2018-06-07 NOTE — MR AVS SNAPSHOT
After Visit Summary   6/7/2018    Quiana Shepherd    MRN: [id number]           Patient Information     Date Of Birth          1985        Visit Information        Provider Department      6/7/2018 4:50 PM Nii Gomez MD [facility]        Today's Diagnoses     Right arm pain    -  1    Closed nondisplaced fracture of head of right radius, initial encounter           Follow-ups after your visit        Your next 10 appointments already scheduled     Jun 22, 2018 12:30 PM CDT   (Arrive by 12:15 PM)   Return Walk In Ortho with Nii Gomez MD   [facility] ([facility])    [address]  [address] [facility] [id number] [phone]              Who to contact     Please call your clinic at [phone] to:    Ask questions about your health    Make or cancel appointments    Discuss your medicines    Learn about your test results    Speak to your doctor            Additional Information About Your Visit        MyChart Information     [employer] gives you secure access to your electronic health record. If you see a primary care provider, you can also send messages to your care team and make appointments. If you have questions, please call your primary care clinic.  If you do not have a primary care provider, please call [phone] and they will assist you.      [employer] is an electronic gateway that provides easy, online access to your medical records. With [employer], you can request a clinic appointment, read your test results, renew a prescription or communicate with your care team.     To access your existing account, please contact your [facility] or call [phone] for assistance.        Care EveryWhere ID     This is your Care EveryWhere ID. This could be used by other organizations to access your [place] medical records  [id number]        Your Vitals Were      "Pulse Height                71 1.702 m (5' 7\")           Blood Pressure from Last 3 Encounters:   06/07/18 (!) 123/91   03/30/18 118/70   04/04/17 123/86    Weight from Last 3 Encounters:   No data found for Wt                 Today's Medication Changes          These changes are accurate as of 6/7/18  5:46 PM.  If you have any questions, ask your nurse or doctor.               Start taking these medicines.        Dose/Directions    diclofenac 75 MG EC tablet   Commonly known as:  VOLTAREN   Used for:  Right arm pain, Closed nondisplaced fracture of head of right radius, initial encounter   Started by:  Nii Gomez MD        Dose:  75 mg   Take 1 tablet (75 mg) by mouth 2 times daily as needed for moderate pain   Quantity:  30 tablet   Refills:  1       tiZANidine 4 MG tablet   Commonly known as:  ZANAFLEX   Used for:  Right arm pain, Closed nondisplaced fracture of head of right radius, initial encounter   Started by:  Nii Gomez MD        Dose:  4-8 mg   Take 1-2 tablets (4-8 mg) by mouth nightly as needed   Quantity:  30 tablet   Refills:  1            Where to get your medicines      These medications were sent to [address] [place] [address] [address] [address]    Hours:  TRANSPLANT PHONE NUMBER [phone] Phone:  [phone]     diclofenac 75 MG EC tablet    tiZANidine 4 MG tablet                Primary Care Provider    None Specified       No primary provider on file.        Equal Access to Services     REINA SPEARS AH: Dmitri Dyer, wablasda luqadaha, qaybta kaalmada adelynnyada, waxay obinna wilkins adelynn mccarty. So [facility] [phone].    ATENCIÓN: Si habla español, tiene a prakash disposición servicios gratuitos de asistencia lingüística. Llmichelle al [phone].    We comply with applicable federal civil rights laws and [place] laws. We do not discriminate on the basis of race, color, " national origin, age, disability, sex, sexual orientation, or gender identity.            Thank you!     Thank you for choosing [facility] SPORTS AND ORTHOPAEDIC WALK IN CLINIC  for your care. Our goal is always to provide you with excellent care. Hearing back from our patients is one way we can continue to improve our services. Please take a few minutes to complete the written survey that you may receive in the mail after your visit with us. Thank you!             Your Updated Medication List - Protect others around you: Learn how to safely use, store and throw away your medicines at www.disposemymeds.org.          This list is accurate as of 6/7/18  5:46 PM.  Always use your most recent med list.                   Brand Name Dispense Instructions for use Diagnosis    Azelaic Acid 15 % gel     50 g    Apply 0.5 inches topically 2 times daily Massage thin film gently into afected areas morning and evening.    Acne vulgaris       diclofenac 75 MG EC tablet    VOLTAREN    30 tablet    Take 1 tablet (75 mg) by mouth 2 times daily as needed for moderate pain    Right arm pain, Closed nondisplaced fracture of head of right radius, initial encounter       levonorgestrel 20 MCG/24HR IUD    MIRENA     1 each by Intrauterine route once        * spironolactone 25 MG tablet    ALDACTONE    120 tablet    Take 4 tablets (100 mg) by mouth daily    Acne vulgaris       * spironolactone 50 MG tablet    ALDACTONE    180 tablet    3 pills once a day    Acne vulgaris       tiZANidine 4 MG tablet    ZANAFLEX    30 tablet    Take 1-2 tablets (4-8 mg) by mouth nightly as needed    Right arm pain, Closed nondisplaced fracture of head of right radius, initial encounter       tretinoin 0.025 % cream    RETIN-A    45 g    Apply a pea sized amount to a clean and dry face nightly.    Acne vulgaris       * Notice:  This list has 2 medication(s) that are the same as other medications prescribed for you. Read the directions carefully, and ask your  doctor or other care provider to review them with you.
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
## 2018-06-22 ENCOUNTER — RADIANT APPOINTMENT (OUTPATIENT)
Dept: GENERAL RADIOLOGY | Facility: CLINIC | Age: 33
End: 2018-06-22
Attending: PREVENTIVE MEDICINE
Payer: COMMERCIAL

## 2018-06-22 ENCOUNTER — OFFICE VISIT (OUTPATIENT)
Dept: ORTHOPEDICS | Facility: CLINIC | Age: 33
End: 2018-06-22
Payer: COMMERCIAL

## 2018-06-22 VITALS — WEIGHT: 160 LBS | HEIGHT: 67 IN | BODY MASS INDEX: 25.11 KG/M2

## 2018-06-22 DIAGNOSIS — S63.501D RIGHT WRIST SPRAIN, SUBSEQUENT ENCOUNTER: ICD-10-CM

## 2018-06-22 DIAGNOSIS — S52.124D CLOSED NONDISPLACED FRACTURE OF HEAD OF RIGHT RADIUS WITH ROUTINE HEALING, SUBSEQUENT ENCOUNTER: Primary | ICD-10-CM

## 2018-06-22 DIAGNOSIS — S52.124A CLOSED NONDISPLACED FRACTURE OF HEAD OF RIGHT RADIUS: Primary | ICD-10-CM

## 2018-06-22 DIAGNOSIS — S52.124A CLOSED NONDISPLACED FRACTURE OF HEAD OF RIGHT RADIUS: ICD-10-CM

## 2018-06-22 NOTE — MR AVS SNAPSHOT
After Visit Summary   6/22/2018    Quiana Shepherd    MRN: [id number]           Patient Information     Date Of Birth          1985        Visit Information        Provider Department      6/22/2018 12:30 PM Nii Gomez MD [facility]        Today's Diagnoses     Closed nondisplaced fracture of head of right radius with routine healing, subsequent encounter    -  1    Right wrist sprain, subsequent encounter           Follow-ups after your visit        Your next 10 appointments already scheduled     Jul 10, 2018  1:00 PM CDT   (Arrive by 12:45 PM)   Return Walk In Ortho with Nii Gomez MD   [facility] ([facility] [place])    [address]  [address] [facility] [id number] [phone]              Who to contact     Please call your clinic at [phone] to:    Ask questions about your health    Make or cancel appointments    Discuss your medicines    Learn about your test results    Speak to your doctor            Additional Information About Your Visit        MyChart Information     [employer] gives you secure access to your electronic health record. If you see a primary care provider, you can also send messages to your care team and make appointments. If you have questions, please call your primary care clinic.  If you do not have a primary care provider, please call [phone] and they will assist you.      [employer] is an electronic gateway that provides easy, online access to your medical records. With [employer], you can request a clinic appointment, read your test results, renew a prescription or communicate with your care team.     To access your existing account, please contact your [facility] or call [phone] for assistance.        Care EveryWhere ID     This is your Care EveryWhere ID. This could be used by other organizations to access your [facility]  "records  [id number]        Your Vitals Were     Height BMI (Body Mass Index)                1.702 m (5' 7\") 25.06 kg/m2           Blood Pressure from Last 3 Encounters:   06/07/18 (!) 123/91   03/30/18 118/70   04/04/17 123/86    Weight from Last 3 Encounters:   06/22/18 72.6 kg (160 lb)              Today, you had the following     No orders found for display       Primary Care Provider    None Specified       No primary provider on file.        Equal Access to Services     REINA SPEARS : Hadii parth montalvo hadshirleyo Soomaali, waaxda luqadaha, qaybta kaalmada adelynnyaronak, devin lambert . So [facility] [phone].    ATENCIÓN: Si habla español, tiene a prakash disposición servicios gratuitos de asistencia lingüística. Llame al [phone].    We comply with applicable federal civil rights laws and [place] laws. We do not discriminate on the basis of race, color, national origin, age, disability, sex, sexual orientation, or gender identity.            Thank you!     Thank you for choosing [facility]  for your care. Our goal is always to provide you with excellent care. Hearing back from our patients is one way we can continue to improve our services. Please take a few minutes to complete the written survey that you may receive in the mail after your visit with us. Thank you!             Your Updated Medication List - Protect others around you: Learn how to safely use, store and throw away your medicines at www.disposemymeds.org.          This list is accurate as of 6/22/18  1:24 PM.  Always use your most recent med list.                   Brand Name Dispense Instructions for use Diagnosis    Azelaic Acid 15 % gel     50 g    Apply 0.5 inches topically 2 times daily Massage thin film gently into afected areas morning and evening.    Acne vulgaris       diclofenac 75 MG EC tablet    VOLTAREN    30 tablet    Take 1 tablet (75 mg) by mouth 2 times daily as needed for moderate pain    Right arm " pain, Closed nondisplaced fracture of head of right radius, initial encounter       levonorgestrel 20 MCG/24HR IUD    MIRENA     1 each by Intrauterine route once        * spironolactone 25 MG tablet    ALDACTONE    120 tablet    Take 4 tablets (100 mg) by mouth daily    Acne vulgaris       * spironolactone 50 MG tablet    ALDACTONE    180 tablet    3 pills once a day    Acne vulgaris       tiZANidine 4 MG tablet    ZANAFLEX    30 tablet    Take 1-2 tablets (4-8 mg) by mouth nightly as needed    Right arm pain, Closed nondisplaced fracture of head of right radius, initial encounter       tretinoin 0.025 % cream    RETIN-A    45 g    Apply a pea sized amount to a clean and dry face nightly.    Acne vulgaris       * Notice:  This list has 2 medication(s) that are the same as other medications prescribed for you. Read the directions carefully, and ask your doctor or other care provider to review them with you.

## 2018-06-22 NOTE — PROGRESS NOTES
"HISTORY OF PRESENT ILLNESS  Ms. Shepherd is a pleasant 32 year old year old female who presents to clinic today for followup for right radial head fracture  Improved, mild pain still in wrist as well, but improving      MEDICAL HISTORY  Patient Active Problem List   Diagnosis     Family history of melanoma     Acne vulgaris       Current Outpatient Prescriptions   Medication Sig Dispense Refill     Azelaic Acid 15 % gel Apply 0.5 inches topically 2 times daily Massage thin film gently into afected areas morning and evening. 50 g 11     diclofenac (VOLTAREN) 75 MG EC tablet Take 1 tablet (75 mg) by mouth 2 times daily as needed for moderate pain 30 tablet 1     levonorgestrel (MIRENA) 20 MCG/24HR IUD 1 each by Intrauterine route once       spironolactone (ALDACTONE) 25 MG tablet Take 4 tablets (100 mg) by mouth daily (Patient not taking: Reported on 3/30/2018) 120 tablet 5     spironolactone (ALDACTONE) 50 MG tablet 3 pills once a day 180 tablet 3     tiZANidine (ZANAFLEX) 4 MG tablet Take 1-2 tablets (4-8 mg) by mouth nightly as needed 30 tablet 1     tretinoin (RETIN-A) 0.025 % cream Apply a pea sized amount to a clean and dry face nightly. 45 g 11       No Known Allergies    Family History   Problem Relation Age of Onset     Cancer Mother      melanoma     Melanoma Mother      Cancer Other      Maternal aunt has a hx of melanoma     Melanoma Other      maternal aunt     Skin Cancer No family hx of        Additional medical/Social/Surgical histories reviewed in [facility] and updated as appropriate.     REVIEW OF SYSTEMS (6/22/2018)  10 point ROS of systems including Constitutional, Eyes, Respiratory, Cardiovascular, Gastroenterology, Genitourinary, Integumentary, Musculoskeletal, Psychiatric were all negative except for pertinent positives noted in my HPI.     PHYSICAL EXAM  Vitals:    06/22/18 1238   Weight: 72.6 kg (160 lb)   Height: 1.702 m (5' 7\")     Vital Signs: Ht 1.702 m (5' 7\")  Wt 72.6 kg (160 lb)  BMI 25.06 " kg/m2 Patient declined being weighed. Body mass index is 25.06 kg/(m^2).    General  - normal appearance, in no obvious distress  CV  - normal radial pulse  Pulm  - normal respiratory pattern, non-labored  Musculoskeletal - right elbow  - inspection: normal joint alignment, no obvious deformity, mild soft tissue swelling laterally  - palpation: tender at the radial head  - ROM:  160 deg flexion   0 deg extension   90 deg pronation   90 deg supination  - strength: 5/5 wrist extension with elbow flexed, 4/5 with elbow extended, painful resisted extension of long finger with elbow flexed, worse with extension, 5/5  strength  - special tests:  (-) varus  (-) valgus  (-) Tinel's  Neuro  - no sensory or motor deficit, grossly normal coordination, normal muscle tone  Skin  - no ecchymosis, erythema, warmth, or induration, no obvious rash  Psych  - interactive, appropriate, normal mood and affect  Right wrist pain with extension in the extensor tendons, mild swelling    ASSESSMENT & PLAN  33 yo female with right radial head fracture, right wrist sprain, improving  Cont. Rehab exercsises  nsaids PRN  Ice PRN  F/u in 2 weeks    Nii Gomez MD, CAQSM

## 2018-06-22 NOTE — LETTER
6/22/2018       RE: Quiana Shepherd  [address] [facility] [address]     Dear Colleague,    Thank you for referring your patient, Quiana Shepherd, to the [facility] at [facility]. Please see a copy of my visit note below.    HISTORY OF PRESENT ILLNESS  Ms. Shepherd is a pleasant 32 year old year old female who presents to clinic today for followup for right radial head fracture  Improved, mild pain still in wrist as well, but improving      MEDICAL HISTORY  Patient Active Problem List   Diagnosis     Family history of melanoma     Acne vulgaris       Current Outpatient Prescriptions   Medication Sig Dispense Refill     Azelaic Acid 15 % gel Apply 0.5 inches topically 2 times daily Massage thin film gently into afected areas morning and evening. 50 g 11     diclofenac (VOLTAREN) 75 MG EC tablet Take 1 tablet (75 mg) by mouth 2 times daily as needed for moderate pain 30 tablet 1     levonorgestrel (MIRENA) 20 MCG/24HR IUD 1 each by Intrauterine route once       spironolactone (ALDACTONE) 25 MG tablet Take 4 tablets (100 mg) by mouth daily (Patient not taking: Reported on 3/30/2018) 120 tablet 5     spironolactone (ALDACTONE) 50 MG tablet 3 pills once a day 180 tablet 3     tiZANidine (ZANAFLEX) 4 MG tablet Take 1-2 tablets (4-8 mg) by mouth nightly as needed 30 tablet 1     tretinoin (RETIN-A) 0.025 % cream Apply a pea sized amount to a clean and dry face nightly. 45 g 11       No Known Allergies    Family History   Problem Relation Age of Onset     Cancer Mother      melanoma     Melanoma Mother      Cancer Other      Maternal aunt has a hx of melanoma     Melanoma Other      maternal aunt     Skin Cancer No family hx of        Additional medical/Social/Surgical histories reviewed in [facility] and updated as appropriate.     REVIEW OF SYSTEMS (6/22/2018)  10 point ROS of systems including Constitutional, Eyes, Respiratory, Cardiovascular, Gastroenterology,  "Genitourinary, Integumentary, Musculoskeletal, Psychiatric were all negative except for pertinent positives noted in my HPI.     PHYSICAL EXAM  Vitals:    06/22/18 1238   Weight: 72.6 kg (160 lb)   Height: 1.702 m (5' 7\")     Vital Signs: Ht 1.702 m (5' 7\")  Wt 72.6 kg (160 lb)  BMI 25.06 kg/m2 Patient declined being weighed. Body mass index is 25.06 kg/(m^2).    General  - normal appearance, in no obvious distress  CV  - normal radial pulse  Pulm  - normal respiratory pattern, non-labored  Musculoskeletal - right elbow  - inspection: normal joint alignment, no obvious deformity, mild soft tissue swelling laterally  - palpation: tender at the radial head  - ROM:  160 deg flexion   0 deg extension   90 deg pronation   90 deg supination  - strength: 5/5 wrist extension with elbow flexed, 4/5 with elbow extended, painful resisted extension of long finger with elbow flexed, worse with extension, 5/5  strength  - special tests:  (-) varus  (-) valgus  (-) Tinel's  Neuro  - no sensory or motor deficit, grossly normal coordination, normal muscle tone  Skin  - no ecchymosis, erythema, warmth, or induration, no obvious rash  Psych  - interactive, appropriate, normal mood and affect  Right wrist pain with extension in the extensor tendons, mild swelling    ASSESSMENT & PLAN  33 yo female with right radial head fracture, right wrist sprain, improving  Cont. Rehab exercsises  nsaids PRN  Ice PRN  F/u in 2 weeks    Nii Gomez MD, CAQSM    "

## 2018-06-29 NOTE — PROGRESS NOTES
Order(s) created erroneously. Erroneous order ID: [id number]   Order canceled by: JEREMI MCKEON   Order cancel date/time: 06/29/2018 11:24 AM

## 2018-07-10 ENCOUNTER — OFFICE VISIT (OUTPATIENT)
Dept: ORTHOPEDICS | Facility: CLINIC | Age: 33
End: 2018-07-10
Payer: COMMERCIAL

## 2018-07-10 VITALS — WEIGHT: 160 LBS | HEIGHT: 67 IN | BODY MASS INDEX: 25.11 KG/M2

## 2018-07-10 DIAGNOSIS — S63.501D RIGHT WRIST SPRAIN, SUBSEQUENT ENCOUNTER: ICD-10-CM

## 2018-07-10 DIAGNOSIS — S52.124D CLOSED NONDISPLACED FRACTURE OF HEAD OF RIGHT RADIUS WITH ROUTINE HEALING, SUBSEQUENT ENCOUNTER: Primary | ICD-10-CM

## 2018-07-10 NOTE — PROGRESS NOTES
HISTORY OF PRESENT ILLNESS  Ms. Shepherd is a pleasant 33 year old year old female who presents to clinic today for followup for right radial head fracture, feeling much better  Right wrist is still sore with certain activities  Wants some exercises to stretch and strengthen her elbow and wrist.  Uses voltaren PRN with relief    MEDICAL HISTORY  Patient Active Problem List   Diagnosis     Family history of melanoma     Acne vulgaris       Current Outpatient Prescriptions   Medication Sig Dispense Refill     Azelaic Acid 15 % gel Apply 0.5 inches topically 2 times daily Massage thin film gently into afected areas morning and evening. 50 g 11     diclofenac (VOLTAREN) 75 MG EC tablet Take 1 tablet (75 mg) by mouth 2 times daily as needed for moderate pain 30 tablet 1     levonorgestrel (MIRENA) 20 MCG/24HR IUD 1 each by Intrauterine route once       spironolactone (ALDACTONE) 25 MG tablet Take 4 tablets (100 mg) by mouth daily (Patient not taking: Reported on 3/30/2018) 120 tablet 5     spironolactone (ALDACTONE) 50 MG tablet 3 pills once a day 180 tablet 3     tiZANidine (ZANAFLEX) 4 MG tablet Take 1-2 tablets (4-8 mg) by mouth nightly as needed 30 tablet 1     tretinoin (RETIN-A) 0.025 % cream Apply a pea sized amount to a clean and dry face nightly. 45 g 11       No Known Allergies    Family History   Problem Relation Age of Onset     Cancer Mother      melanoma     Melanoma Mother      Cancer Other      Maternal aunt has a hx of melanoma     Melanoma Other      maternal aunt     Skin Cancer No family hx of        Additional medical/Social/Surgical histories reviewed in [facility] and updated as appropriate.     REVIEW OF SYSTEMS (7/10/2018)  10 point ROS of systems including Constitutional, Eyes, Respiratory, Cardiovascular, Gastroenterology, Genitourinary, Integumentary, Musculoskeletal, Psychiatric were all negative except for pertinent positives noted in my HPI.     PHYSICAL EXAM  Vitals:    07/10/18 1313   Weight:  "72.6 kg (160 lb)   Height: 1.702 m (5' 7\")     Vital Signs: Ht 1.702 m (5' 7\")  Wt 72.6 kg (160 lb)  BMI 25.06 kg/m2 Patient declined being weighed. Body mass index is 25.06 kg/(m^2).    General  - normal appearance, in no obvious distress  CV  - normal radial pulse  Pulm  - normal respiratory pattern, non-labored  Musculoskeletal - right elbow and wrist  - inspection: normal joint alignment, no obvious deformity, mild soft tissue swelling laterally  - palpation: today, NON tender at the radial head, slightly tender near TFCC at right wrist  - ROM:  160 deg flexion   0 deg extension   90 deg pronation   90 deg supination  - strength: 5/5 wrist extension with elbow flexed, 5/5 with elbow extended, NON painful resisted extension of long finger with elbow flexed, NOT worse with extension, 5/5  strength  - special tests:  (-) varus  (-) valgus  (-) Tinel's  Neuro  - no sensory or motor deficit, grossly normal coordination, normal muscle tone  Skin  - no ecchymosis, erythema, warmth, or induration, no obvious rash  Psych  - interactive, appropriate, normal mood and affect    ASSESSMENT & PLAN  34 yo female with right elbow radial head fracture, healed, right wrist sprain improved  Given HEP  And hand therapy order  F/u as needed  voltaren as needed      Nii Gomez MD, CAQSM  "

## 2018-07-10 NOTE — LETTER
7/10/2018       RE: Quiana Shepherd  [address] [facility] [address]     Dear Colleague,    Thank you for referring your patient, Quiana Shepherd, to the [facility] at [facility]. Please see a copy of my visit note below.    HISTORY OF PRESENT ILLNESS  Ms. Shepherd is a pleasant 33 year old year old female who presents to clinic today for followup for right radial head fracture, feeling much better  Right wrist is still sore with certain activities  Wants some exercises to stretch and strengthen her elbow and wrist.  Uses voltaren PRN with relief    MEDICAL HISTORY  Patient Active Problem List   Diagnosis     Family history of melanoma     Acne vulgaris       Current Outpatient Prescriptions   Medication Sig Dispense Refill     Azelaic Acid 15 % gel Apply 0.5 inches topically 2 times daily Massage thin film gently into afected areas morning and evening. 50 g 11     diclofenac (VOLTAREN) 75 MG EC tablet Take 1 tablet (75 mg) by mouth 2 times daily as needed for moderate pain 30 tablet 1     levonorgestrel (MIRENA) 20 MCG/24HR IUD 1 each by Intrauterine route once       spironolactone (ALDACTONE) 25 MG tablet Take 4 tablets (100 mg) by mouth daily (Patient not taking: Reported on 3/30/2018) 120 tablet 5     spironolactone (ALDACTONE) 50 MG tablet 3 pills once a day 180 tablet 3     tiZANidine (ZANAFLEX) 4 MG tablet Take 1-2 tablets (4-8 mg) by mouth nightly as needed 30 tablet 1     tretinoin (RETIN-A) 0.025 % cream Apply a pea sized amount to a clean and dry face nightly. 45 g 11       No Known Allergies    Family History   Problem Relation Age of Onset     Cancer Mother      melanoma     Melanoma Mother      Cancer Other      Maternal aunt has a hx of melanoma     Melanoma Other      maternal aunt     Skin Cancer No family hx of        Additional medical/Social/Surgical histories reviewed in [email or web address] and updated as appropriate.     REVIEW OF SYSTEMS (7/10/2018)  10  "point ROS of systems including Constitutional, Eyes, Respiratory, Cardiovascular, Gastroenterology, Genitourinary, Integumentary, Musculoskeletal, Psychiatric were all negative except for pertinent positives noted in my HPI.     PHYSICAL EXAM  Vitals:    07/10/18 1313   Weight: 72.6 kg (160 lb)   Height: 1.702 m (5' 7\")     Vital Signs: Ht 1.702 m (5' 7\")  Wt 72.6 kg (160 lb)  BMI 25.06 kg/m2 Patient declined being weighed. Body mass index is 25.06 kg/(m^2).    General  - normal appearance, in no obvious distress  CV  - normal radial pulse  Pulm  - normal respiratory pattern, non-labored  Musculoskeletal - right elbow and wrist  - inspection: normal joint alignment, no obvious deformity, mild soft tissue swelling laterally  - palpation: today, NON tender at the radial head, slightly tender near TFCC at right wrist  - ROM:  160 deg flexion   0 deg extension   90 deg pronation   90 deg supination  - strength: 5/5 wrist extension with elbow flexed, 5/5 with elbow extended, NON painful resisted extension of long finger with elbow flexed, NOT worse with extension, 5/5  strength  - special tests:  (-) varus  (-) valgus  (-) Tinel's  Neuro  - no sensory or motor deficit, grossly normal coordination, normal muscle tone  Skin  - no ecchymosis, erythema, warmth, or induration, no obvious rash  Psych  - interactive, appropriate, normal mood and affect    ASSESSMENT & PLAN  34 yo female with right elbow radial head fracture, healed, right wrist sprain improved  Given HEP  And hand therapy order  F/u as needed  voltaren as needed      Nii Gomez MD, CAQSM     "

## 2018-07-10 NOTE — MR AVS SNAPSHOT
After Visit Summary   7/10/2018    Quiana Shepherd    MRN: [id number]           Patient Information     Date Of Birth          1985        Visit Information        Provider Department      7/10/2018 1:00 PM Nii Gomez MD [facility]        Today's Diagnoses     Closed nondisplaced fracture of head of right radius with routine healing, subsequent encounter    -  1    Right wrist sprain, subsequent encounter           Follow-ups after your visit        Your next 10 appointments already scheduled     Jul 12, 2018  3:00 PM CDT   (Arrive by 2:45 PM)   LAI Hand with Jeanie Stewart OT   [facility] ([facility])    [address]  [address] [facility] [address] [phone]              Who to contact     Please call your clinic at [phone] to:    Ask questions about your health    Make or cancel appointments    Discuss your medicines    Learn about your test results    Speak to your doctor            Additional Information About Your Visit        MyChart Information     [employer] gives you secure access to your electronic health record. If you see a primary care provider, you can also send messages to your care team and make appointments. If you have questions, please call your primary care clinic.  If you do not have a primary care provider, please call [phone] and they will assist you.      [employer] is an electronic gateway that provides easy, online access to your medical records. With [employer], you can request a clinic appointment, read your test results, renew a prescription or communicate with your care team.     To access your existing account, please contact your [facility] or call [phone] for assistance.        Care EveryWhere ID     This is your Care EveryWhere ID. This could be used by other organizations to access your [place] medical records  [id number]        Your Vitals  "Were     Height BMI (Body Mass Index)                1.702 m (5' 7\") 25.06 kg/m2           Blood Pressure from Last 3 Encounters:   06/07/18 (!) 123/91   03/30/18 118/70   04/04/17 123/86    Weight from Last 3 Encounters:   07/10/18 72.6 kg (160 lb)   06/22/18 72.6 kg (160 lb)              Today, you had the following     No orders found for display       Primary Care Provider    None Specified       No primary provider on file.        Equal Access to Services     REINA SPEARS : Hadii parth kumaro Soshahrzad, waaxda luqadaha, qaybta kaalmada trell, devin lambert . So [facility] [phone].    ATENCIÓN: Si habla español, tiene a prakash disposición servicios gratuitos de asistencia lingüística. Llame al [phone].    We comply with applicable federal civil rights laws and [place] laws. We do not discriminate on the basis of race, color, national origin, age, disability, sex, sexual orientation, or gender identity.            Thank you!     Thank you for choosing [facility]  for your care. Our goal is always to provide you with excellent care. Hearing back from our patients is one way we can continue to improve our services. Please take a few minutes to complete the written survey that you may receive in the mail after your visit with us. Thank you!             Your Updated Medication List - Protect others around you: Learn how to safely use, store and throw away your medicines at www.disposemymeds.org.          This list is accurate as of 7/10/18  2:17 PM.  Always use your most recent med list.                   Brand Name Dispense Instructions for use Diagnosis    Azelaic Acid 15 % gel     50 g    Apply 0.5 inches topically 2 times daily Massage thin film gently into afected areas morning and evening.    Acne vulgaris       diclofenac 75 MG EC tablet    VOLTAREN    30 tablet    Take 1 tablet (75 mg) by mouth 2 times daily as needed for moderate pain    Right arm pain, Closed " nondisplaced fracture of head of right radius, initial encounter       levonorgestrel 20 MCG/24HR IUD    MIRENA     1 each by Intrauterine route once        * spironolactone 25 MG tablet    ALDACTONE    120 tablet    Take 4 tablets (100 mg) by mouth daily    Acne vulgaris       * spironolactone 50 MG tablet    ALDACTONE    180 tablet    3 pills once a day    Acne vulgaris       tiZANidine 4 MG tablet    ZANAFLEX    30 tablet    Take 1-2 tablets (4-8 mg) by mouth nightly as needed    Right arm pain, Closed nondisplaced fracture of head of right radius, initial encounter       tretinoin 0.025 % cream    RETIN-A    45 g    Apply a pea sized amount to a clean and dry face nightly.    Acne vulgaris       * Notice:  This list has 2 medication(s) that are the same as other medications prescribed for you. Read the directions carefully, and ask your doctor or other care provider to review them with you.

## 2018-07-12 ENCOUNTER — THERAPY VISIT (OUTPATIENT)
Dept: OCCUPATIONAL THERAPY | Facility: CLINIC | Age: 33
End: 2018-07-12
Payer: COMMERCIAL

## 2018-07-12 DIAGNOSIS — M25.531 RIGHT WRIST PAIN: ICD-10-CM

## 2018-07-12 DIAGNOSIS — M25.521 RIGHT ELBOW PAIN: Primary | ICD-10-CM

## 2018-07-12 DIAGNOSIS — S52.124D CLOSED NONDISPLACED FRACTURE OF HEAD OF RIGHT RADIUS WITH ROUTINE HEALING, SUBSEQUENT ENCOUNTER: ICD-10-CM

## 2018-07-12 PROCEDURE — 97110 THERAPEUTIC EXERCISES: CPT | Mod: GO | Performed by: OCCUPATIONAL THERAPIST

## 2018-07-12 PROCEDURE — 97112 NEUROMUSCULAR REEDUCATION: CPT | Mod: GO | Performed by: OCCUPATIONAL THERAPIST

## 2018-07-12 PROCEDURE — 97165 OT EVAL LOW COMPLEX 30 MIN: CPT | Mod: GO | Performed by: OCCUPATIONAL THERAPIST

## 2018-07-12 NOTE — MR AVS SNAPSHOT
After Visit Summary   7/12/2018    Quiana Shepherd    MRN: [id number]           Patient Information     Date Of Birth          1985        Visit Information        Provider Department      7/12/2018 3:00 PM Jeanie Stewart OT [facility]        Today's Diagnoses     Right elbow pain    -  1    Right wrist pain        Closed nondisplaced fracture of head of right radius with routine healing, subsequent encounter           Follow-ups after your visit        Your next 10 appointments already scheduled     Jul 20, 2018  3:00 PM CDT   [facility] Hand with [facility] ([facility])    [address] [place] [id number] [phone]            Jul 27, 2018  1:00 PM CDT   [facility] Hand with [facility] IS[facility] ([facility])    [address] [place] [id number] [phone]              Who to contact     If you have questions or need follow up information about today's clinic visit or your schedule please contact [facility] directly at [phone].  Normal or non-critical lab and imaging results will be communicated to you by [employer], letter or phone within 4 business days after the clinic has received the results. If you do not hear from us within 7 days, please contact the clinic through [employer] or phone. If you have a critical or abnormal lab result, we will notify you by phone as soon as possible.  Submit refill requests through [employer] or call your pharmacy and they will forward the refill request to us. Please allow 3 business days for your refill to be completed.          Additional Information About Your Visit        MyChart Information     [employer] gives you secure access to your electronic health record. If you see a primary care provider, you can also send messages to your care team and make appointments. If you have questions,  please call your primary care clinic.  If you do not have a primary care provider, please call [phone] and they will assist you.        Care EveryWhere ID     This is your Care EveryWhere ID. This could be used by other organizations to access your [facility] medical records  [id number]         Blood Pressure from Last 3 Encounters:   06/07/18 (!) 123/91   03/30/18 118/70   04/04/17 123/86    Weight from Last 3 Encounters:   07/10/18 72.6 kg (160 lb)   06/22/18 72.6 kg (160 lb)              We Performed the Following     HC OT EVAL, LOW COMPLEXITY     NEUROMUSCULAR RE-EDUCATION     THERAPEUTIC EXERCISES        Primary Care Provider    None Specified       No primary provider on file.        Equal Access to Services     REINA SPEARS : Dmitri Dyer, eladia martell, mino kaalmaronak cai, devin lambert . So [facility] [phone].    ATENCIÓN: Si habla español, tiene a prakash disposición servicios gratuitos de asistencia lingüística. Llame al [phone].    We comply with applicable federal civil rights laws and [place] laws. We do not discriminate on the basis of race, color, national origin, age, disability, sex, sexual orientation, or gender identity.            Thank you!     Thank you for choosing [facility]  for your care. Our goal is always to provide you with excellent care. Hearing back from our patients is one way we can continue to improve our services. Please take a few minutes to complete the written survey that you may receive in the mail after your visit with us. Thank you!             Your Updated Medication List - Protect others around you: Learn how to safely use, store and throw away your medicines at www.disposemymeds.org.          This list is accurate as of 7/12/18  4:32 PM.  Always use your most recent med list.                   Brand Name Dispense Instructions for use Diagnosis    Azelaic Acid 15 % gel     50 g    Apply 0.5 inches  topically 2 times daily Massage thin film gently into afected areas morning and evening.    Acne vulgaris       diclofenac 75 MG EC tablet    VOLTAREN    30 tablet    Take 1 tablet (75 mg) by mouth 2 times daily as needed for moderate pain    Right arm pain, Closed nondisplaced fracture of head of right radius, initial encounter       levonorgestrel 20 MCG/24HR IUD    MIRENA     1 each by Intrauterine route once        * spironolactone 25 MG tablet    ALDACTONE    120 tablet    Take 4 tablets (100 mg) by mouth daily    Acne vulgaris       * spironolactone 50 MG tablet    ALDACTONE    180 tablet    3 pills once a day    Acne vulgaris       tiZANidine 4 MG tablet    ZANAFLEX    30 tablet    Take 1-2 tablets (4-8 mg) by mouth nightly as needed    Right arm pain, Closed nondisplaced fracture of head of right radius, initial encounter       tretinoin 0.025 % cream    RETIN-A    45 g    Apply a pea sized amount to a clean and dry face nightly.    Acne vulgaris       * Notice:  This list has 2 medication(s) that are the same as other medications prescribed for you. Read the directions carefully, and ask your doctor or other care provider to review them with you.
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
## 2018-07-20 ENCOUNTER — THERAPY VISIT (OUTPATIENT)
Dept: OCCUPATIONAL THERAPY | Facility: CLINIC | Age: 33
End: 2018-07-20
Payer: COMMERCIAL

## 2018-07-20 DIAGNOSIS — M25.531 RIGHT WRIST PAIN: ICD-10-CM

## 2018-07-20 DIAGNOSIS — M25.521 RIGHT ELBOW PAIN: Primary | ICD-10-CM

## 2018-07-20 DIAGNOSIS — S52.124D CLOSED NONDISPLACED FRACTURE OF HEAD OF RIGHT RADIUS WITH ROUTINE HEALING, SUBSEQUENT ENCOUNTER: ICD-10-CM

## 2018-07-20 PROCEDURE — 97110 THERAPEUTIC EXERCISES: CPT | Mod: GO | Performed by: OCCUPATIONAL THERAPIST

## 2018-07-20 NOTE — MR AVS SNAPSHOT
After Visit Summary   7/20/2018    Quiana Shepherd    MRN: [id number]           Patient Information     Date Of Birth          1985        Visit Information        Provider Department      7/20/2018 3:00 PM Jeanie Stewart OT [facility]        Today's Diagnoses     Right elbow pain    -  1    Right wrist pain        Closed nondisplaced fracture of head of right radius with routine healing, subsequent encounter           Follow-ups after your visit        Who to contact     If you have questions or need follow up information about today's clinic visit or your schedule please contact [facility]ER[facility] directly at [phone].  Normal or non-critical lab and imaging results will be communicated to you by [employer], letter or phone within 4 business days after the clinic has received the results. If you do not hear from us within 7 days, please contact the clinic through [employer] or phone. If you have a critical or abnormal lab result, we will notify you by phone as soon as possible.  Submit refill requests through [employer] or call your pharmacy and they will forward the refill request to us. Please allow 3 business days for your refill to be completed.          Additional Information About Your Visit        MyChart Information     [employer] gives you secure access to your electronic health record. If you see a primary care provider, you can also send messages to your care team and make appointments. If you have questions, please call your primary care clinic.  If you do not have a primary care provider, please call [phone] and they will assist you.        Care EveryWhere ID     This is your Care EveryWhere ID. This could be used by other organizations to access your [facility] medical records  [id number]         Blood Pressure from Last 3 Encounters:   06/07/18 (!) 123/91   03/30/18 118/70   04/04/17 123/86    Weight from Last 3 Encounters:   07/10/18 72.6 kg (160 lb)   06/22/18 72.6  kg (160 lb)              We Performed the Following     LAI PROGRESS NOTES REPORT     THERAPEUTIC EXERCISES        Primary Care Provider Fax #    [facility] [phone]       [address] [place] [address]        Equal Access to Services     REINA SPEARS : Hadii aad ku hadshirleyo Soshahrzad, waaxda luqadaha, qaybta kaalmada shikhada, devin wilkins bernicelynn christensenflako mccarty. So [facility] [phone].    ATENCIÓN: Si habla español, tiene a prakash disposición servicios gratuitos de asistencia lingüística. Llame al [phone].    We comply with applicable federal civil rights laws and [place] laws. We do not discriminate on the basis of race, color, national origin, age, disability, sex, sexual orientation, or gender identity.            Thank you!     Thank you for choosing [facility]ER[facility]  for your care. Our goal is always to provide you with excellent care. Hearing back from our patients is one way we can continue to improve our services. Please take a few minutes to complete the written survey that you may receive in the mail after your visit with us. Thank you!             Your Updated Medication List - Protect others around you: Learn how to safely use, store and throw away your medicines at www.disposemymeds.org.          This list is accurate as of 7/20/18  3:28 PM.  Always use your most recent med list.                   Brand Name Dispense Instructions for use Diagnosis    Azelaic Acid 15 % gel     50 g    Apply 0.5 inches topically 2 times daily Massage thin film gently into afected areas morning and evening.    Acne vulgaris       diclofenac 75 MG EC tablet    VOLTAREN    30 tablet    Take 1 tablet (75 mg) by mouth 2 times daily as needed for moderate pain    Right arm pain, Closed nondisplaced fracture of head of right radius, initial encounter       levonorgestrel 20 MCG/24HR IUD    MIRENA     1 each by Intrauterine route once        * spironolactone 25 MG tablet    ALDACTONE     120 tablet    Take 4 tablets (100 mg) by mouth daily    Acne vulgaris       * spironolactone 50 MG tablet    ALDACTONE    180 tablet    3 pills once a day    Acne vulgaris       tiZANidine 4 MG tablet    ZANAFLEX    30 tablet    Take 1-2 tablets (4-8 mg) by mouth nightly as needed    Right arm pain, Closed nondisplaced fracture of head of right radius, initial encounter       tretinoin 0.025 % cream    RETIN-A    45 g    Apply a pea sized amount to a clean and dry face nightly.    Acne vulgaris       * Notice:  This list has 2 medication(s) that are the same as other medications prescribed for you. Read the directions carefully, and ask your doctor or other care provider to review them with you.
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
## 2018-10-26 ENCOUNTER — OFFICE VISIT (OUTPATIENT)
Dept: DERMATOLOGY | Facility: CLINIC | Age: 33
End: 2018-10-26
Payer: COMMERCIAL

## 2018-10-26 VITALS — DIASTOLIC BLOOD PRESSURE: 64 MMHG | SYSTOLIC BLOOD PRESSURE: 110 MMHG

## 2018-10-26 DIAGNOSIS — L72.0 MILIA: ICD-10-CM

## 2018-10-26 DIAGNOSIS — L68.9 EXCESSIVE HAIR GROWTH: ICD-10-CM

## 2018-10-26 DIAGNOSIS — L70.0 ACNE VULGARIS: Primary | ICD-10-CM

## 2018-10-26 RX ORDER — SPIRONOLACTONE 100 MG/1
100 TABLET, FILM COATED ORAL DAILY
Qty: 120 TABLET | Refills: 3 | Status: SHIPPED | OUTPATIENT
Start: 2018-10-26 | End: 2018-12-31

## 2018-10-26 RX ORDER — TRETINOIN 0.5 MG/G
CREAM TOPICAL AT BEDTIME
Qty: 45 G | Refills: 3 | Status: SHIPPED | OUTPATIENT
Start: 2018-10-26 | End: 2019-08-27

## 2018-10-26 ASSESSMENT — PAIN SCALES - GENERAL: PAINLEVEL: NO PAIN (0)

## 2018-10-26 NOTE — LETTER
10/26/2018       RE: Quiana Shepherd  [address] [facility] [address]     Dear Colleague,    Thank you for referring your patient, Quiana Shepherd, to the [facility] DERMATOLOGY at [facility]. Please see a copy of my visit note below.    [facility] Dermatology Note      Dermatology Problem List:  1.Acne Vulgaris with hormonal component and secondary hirsutism   -current tx: tretinoin 0.05% cream QHS, azelaic acid 15% gel BID,spironolactone 200mg po every day  -previous tx: spironolactone 100mg every day  And 150mg po every day, tretinoin 0.025% cream  2. Seborrheic Keratosis   3. Fhx melanoma - (mother)    Encounter Date: Oct 26, 2018    CC:  Chief Complaint   Patient presents with     Acne     Quiana is here today for an acne follow up- notes improvment.        History of Present Illness:  Ms. Quiana Shepherd is a 33 year old female who presents as a follow-up for acne. The patient was last seen 03/30/2018 when she was prescribed spironolactone 150 mg, and continued on Tretinoin 0.025%, and azelaic acid 15% gel. Today the patient reports, that things are going well but she is still experiencing some hair growth on the chin - overall this has improved, but has not stopped and she has to pluck her chin hairs often she says.  Patient was wondering if there are any side effects if the dose of the spironolactone increased. Thus far she has been tolerating it very well. Patient states that the tretinoin and azelaic acid have been helping. She has noticed a few white spots on the face. She states that she has been tolerating the dryness. Denies menstrual irregularities, breast tenderness, or excessive urination. The patient is using contraception - Mirena IUD.     Past Medical History:   Patient Active Problem List   Diagnosis     Family history of melanoma     Acne vulgaris     Past Medical History:   Diagnosis Date     Acne vulgaris 10/26/2017     History reviewed.  No pertinent surgical history.    Social History:   reports that she has never smoked. She has never used smokeless tobacco.    Family History:  Family History   Problem Relation Age of Onset     Cancer Mother      melanoma     Melanoma Mother      Cancer Other      Maternal aunt has a hx of melanoma     Melanoma Other      maternal aunt     Skin Cancer No family hx of        Medications:  Current Outpatient Prescriptions   Medication Sig Dispense Refill     Azelaic Acid 15 % gel Apply 0.5 inches topically 2 times daily Massage thin film gently into afected areas morning and evening. 50 g 11     diclofenac (VOLTAREN) 75 MG EC tablet Take 1 tablet (75 mg) by mouth 2 times daily as needed for moderate pain 30 tablet 1     levonorgestrel (MIRENA) 20 MCG/24HR IUD 1 each by Intrauterine route once       spironolactone (ALDACTONE) 25 MG tablet Take 4 tablets (100 mg) by mouth daily 120 tablet 5     spironolactone (ALDACTONE) 50 MG tablet 3 pills once a day 180 tablet 3     tiZANidine (ZANAFLEX) 4 MG tablet Take 1-2 tablets (4-8 mg) by mouth nightly as needed 30 tablet 1     tretinoin (RETIN-A) 0.025 % cream Apply a pea sized amount to a clean and dry face nightly. 45 g 11       No Known Allergies    Review of Systems:  -Constitutional: The patient denies fatigue, fevers, chills, unintended weight loss, and night sweats.  -Skin: As above in HPI. No additional skin concerns.  -GI: The patient denies nausea, vomiting, diarrhea or abdominal pain.  -: No changes in menstrual cycle, no breast tenderness, no change in urination  -CVS: no palpitations, dizziness    Physical exam:  Vitals: /64 (Cuff Size: Adult Regular)  GEN: This is a well developed, well-nourished female in no acute distress, in a pleasant mood.    SKIN: Acne exam, which includes the face, neck, upper central chest, and upper central back was performed.  -There are superifical acneiform papules with intermixed open and closed comedones on the  face  -small 1-2mm white papules ont he forehead  -currently very few hair fibers on the chin  -No other lesions of concern on areas examined.     Impression/Plan:  1. Acne vulgaris/Milia with hormonal component and hurtsusim     Side effects of spironolactone discussed including postural hypotension, increased frequency of urination, breast tenderness, menstrual spotting, cardiac arrythmias and the need for contraception due to fetal feminization.      Blood pressure was obtained. Discussed with patient that medication will need to be stopped if pregnancy is desired.     Increase Spironolactone 200mg daily from 150mg due to continued hair growth on chin    Electrolysis and laser hair removal continue to potential options for patient to consider    There is no family history or personal history of breast malignancy known to patient.    Increase tretinoin to 0.05 % cream at bedtime     Continue Azeliac acid 15% gel BID     Discussed having a FBSE in 6 months due to fhx melanoma\    CC Dr. Lomax on close of this encounter.  Follow-up in 6 months, earlier for new or changing lesions.       Staff Involved:    Scribe Disclosure  I, Alvino Groves, am serving as a scribe to document services personally performed by Delfina Rowley PA-C, based on data collection and the provider's statements to me.     Provider Disclosure:   The documentation recorded by the scribe accurately reflects the services I personally performed and the decisions made by me.    All risks, benefits and alternatives were discussed with patient.  Patient is in agreement and understands the assessment and plan.  All questions were answered.    Delfina Rowley PA-C  [facility]: Phone: [phone], Fax: [phone]

## 2018-10-26 NOTE — PROGRESS NOTES
[facility] Dermatology Note      Dermatology Problem List:  1.Acne Vulgaris with hormonal component and secondary hirsutism   -current tx: tretinoin 0.05% cream QHS, azelaic acid 15% gel BID,spironolactone 200mg po every day  -previous tx: spironolactone 100mg every day  And 150mg po every day, tretinoin 0.025% cream  2. Seborrheic Keratosis   3. Fhx melanoma - (mother)    Encounter Date: Oct 26, 2018    CC:  Chief Complaint   Patient presents with     Acne     Quiana is here today for an acne follow up- notes improvment.        History of Present Illness:  Ms. Quiana Shepherd is a 33 year old female who presents as a follow-up for acne. The patient was last seen 03/30/2018 when she was prescribed spironolactone 150 mg, and continued on Tretinoin 0.025%, and azelaic acid 15% gel. Today the patient reports, that things are going well but she is still experiencing some hair growth on the chin - overall this has improved, but has not stopped and she has to pluck her chin hairs often she says.  Patient was wondering if there are any side effects if the dose of the spironolactone increased. Thus far she has been tolerating it very well. Patient states that the tretinoin and azelaic acid have been helping. She has noticed a few white spots on the face. She states that she has been tolerating the dryness. Denies menstrual irregularities, breast tenderness, or excessive urination. The patient is using contraception - Mirena IUD.     Past Medical History:   Patient Active Problem List   Diagnosis     Family history of melanoma     Acne vulgaris     Past Medical History:   Diagnosis Date     Acne vulgaris 10/26/2017     History reviewed. No pertinent surgical history.    Social History:   reports that she has never smoked. She has never used smokeless tobacco.    Family History:  Family History   Problem Relation Age of Onset     Cancer Mother      melanoma     Melanoma Mother      Cancer Other      Maternal  aunt has a hx of melanoma     Melanoma Other      maternal aunt     Skin Cancer No family hx of        Medications:  Current Outpatient Prescriptions   Medication Sig Dispense Refill     Azelaic Acid 15 % gel Apply 0.5 inches topically 2 times daily Massage thin film gently into afected areas morning and evening. 50 g 11     diclofenac (VOLTAREN) 75 MG EC tablet Take 1 tablet (75 mg) by mouth 2 times daily as needed for moderate pain 30 tablet 1     levonorgestrel (MIRENA) 20 MCG/24HR IUD 1 each by Intrauterine route once       spironolactone (ALDACTONE) 25 MG tablet Take 4 tablets (100 mg) by mouth daily 120 tablet 5     spironolactone (ALDACTONE) 50 MG tablet 3 pills once a day 180 tablet 3     tiZANidine (ZANAFLEX) 4 MG tablet Take 1-2 tablets (4-8 mg) by mouth nightly as needed 30 tablet 1     tretinoin (RETIN-A) 0.025 % cream Apply a pea sized amount to a clean and dry face nightly. 45 g 11       No Known Allergies    Review of Systems:  -Constitutional: The patient denies fatigue, fevers, chills, unintended weight loss, and night sweats.  -Skin: As above in HPI. No additional skin concerns.  -GI: The patient denies nausea, vomiting, diarrhea or abdominal pain.  -: No changes in menstrual cycle, no breast tenderness, no change in urination  -CVS: no palpitations, dizziness    Physical exam:  Vitals: /64 (Cuff Size: Adult Regular)  GEN: This is a well developed, well-nourished female in no acute distress, in a pleasant mood.    SKIN: Acne exam, which includes the face, neck, upper central chest, and upper central back was performed.  -There are superifical acneiform papules with intermixed open and closed comedones on the face  -small 1-2mm white papules ont he forehead  -currently very few hair fibers on the chin  -No other lesions of concern on areas examined.     Impression/Plan:  1. Acne vulgaris/Milia with hormonal component and hurtsusim     Side effects of spironolactone discussed including  postural hypotension, increased frequency of urination, breast tenderness, menstrual spotting, cardiac arrythmias and the need for contraception due to fetal feminization.      Blood pressure was obtained. Discussed with patient that medication will need to be stopped if pregnancy is desired.     Increase Spironolactone 200mg daily from 150mg due to continued hair growth on chin    Electrolysis and laser hair removal continue to potential options for patient to consider    There is no family history or personal history of breast malignancy known to patient.    Increase tretinoin to 0.05 % cream at bedtime     Continue Azeliac acid 15% gel BID     Discussed having a FBSE in 6 months due to fhx melanoma\    CC Dr. Lomax on close of this encounter.  Follow-up in 6 months, earlier for new or changing lesions.       Staff Involved:    Scribe Disclosure  I, Alvino Groves, am serving as a scribe to document services personally performed by Delfina Rowley PA-C, based on data collection and the provider's statements to me.     Provider Disclosure:   The documentation recorded by the scribe accurately reflects the services I personally performed and the decisions made by me.    All risks, benefits and alternatives were discussed with patient.  Patient is in agreement and understands the assessment and plan.  All questions were answered.    Delfina Rowley PA-C  [facility]: Phone: [phone], Fax: [phone]

## 2018-10-26 NOTE — MR AVS SNAPSHOT
After Visit Summary   10/26/2018    Quiana Shepherd    MRN: [id number]           Patient Information     Date Of Birth          1985        Visit Information        Provider Department      10/26/2018 8:30 AM Delfina Rowley PA-C [facility] Dermatology        Today's Diagnoses     Acne vulgaris    -  1    Excessive hair growth        Milia           Follow-ups after your visit        Follow-up notes from your care team     Return in about 6 months (around 4/26/2019).      Who to contact     Please call your clinic at [phone] to:    Ask questions about your health    Make or cancel appointments    Discuss your medicines    Learn about your test results    Speak to your doctor            Additional Information About Your Visit        MedWhathart Information     [employer] gives you secure access to your electronic health record. If you see a primary care provider, you can also send messages to your care team and make appointments. If you have questions, please call your primary care clinic.  If you do not have a primary care provider, please call [phone] and they will assist you.      [employer] is an electronic gateway that provides easy, online access to your medical records. With [employer], you can request a clinic appointment, read your test results, renew a prescription or communicate with your care team.     To access your existing account, please contact your [facility] or call [phone] for assistance.        Care EveryWhere ID     This is your Care EveryWhere ID. This could be used by other organizations to access your [facility] medical records  [id number]         Blood Pressure from Last 3 Encounters:   10/26/18 110/64   06/07/18 (!) 123/91   03/30/18 118/70    Weight from Last 3 Encounters:   07/10/18 72.6 kg (160 lb)   06/22/18 72.6 kg (160 lb)              Today, you had the following     No orders found for display         Today's Medication Changes           These changes are accurate as of 10/26/18 11:59 PM.  If you have any questions, ask your nurse or doctor.               Start taking these medicines.        Dose/Directions    tretinoin 0.05 % cream   Commonly known as:  RETIN-A   Used for:  Acne vulgaris   Replaces:  tretinoin 0.025 % cream   Started by:  Delfina Rowley PA-C        Apply topically At Bedtime   Quantity:  45 g   Refills:  3         These medicines have changed or have updated prescriptions.        Dose/Directions    spironolactone 100 MG tablet   Commonly known as:  ALDACTONE   This may have changed:    - medication strength  - Another medication with the same name was removed. Continue taking this medication, and follow the directions you see here.   Used for:  Acne vulgaris, Excessive hair growth   Changed by:  Delfina Rowley PA-C        Dose:  100 mg   Take 1 tablet (100 mg) by mouth daily   Quantity:  120 tablet   Refills:  3         Stop taking these medicines if you haven't already. Please contact your care team if you have questions.     tretinoin 0.025 % cream   Commonly known as:  RETIN-A   Replaced by:  tretinoin 0.05 % cream   Stopped by:  Delfina Rowley PA-C                Where to get your medicines      These medications were sent to [address] [place], [place] [id number]    Hours:  TRANSPLANT PHONE NUMBER [phone] Phone:  [phone]     spironolactone 100 MG tablet    tretinoin 0.05 % cream                Primary Care Provider Fax #    [facility] [phone]       [address] [place] [address]        Equal Access to Services     [facility] Hospital[facility] AH: Hadii parth kumaro Soshahrzad, waaxda luqadaha, qaybta kaalmada trell, devin mccarty. So [facility] [phone].    ATENCIÓN: Si habla español, tiene a prakash disposición servicios gratuitos de asistencia lingüística. Llame al  [phone].    We comply with applicable federal civil rights laws and [place] laws. We do not discriminate on the basis of race, color, national origin, age, disability, sex, sexual orientation, or gender identity.            Thank you!     Thank you for choosing [facility] DERMATOLOGY  for your care. Our goal is always to provide you with excellent care. Hearing back from our patients is one way we can continue to improve our services. Please take a few minutes to complete the written survey that you may receive in the mail after your visit with us. Thank you!             Your Updated Medication List - Protect others around you: Learn how to safely use, store and throw away your medicines at www.disposemymeds.org.          This list is accurate as of 10/26/18 11:59 PM.  Always use your most recent med list.                   Brand Name Dispense Instructions for use Diagnosis    Azelaic Acid 15 % gel     50 g    Apply 0.5 inches topically 2 times daily Massage thin film gently into afected areas morning and evening.    Acne vulgaris       diclofenac 75 MG EC tablet    VOLTAREN    30 tablet    Take 1 tablet (75 mg) by mouth 2 times daily as needed for moderate pain    Right arm pain, Closed nondisplaced fracture of head of right radius, initial encounter       levonorgestrel 20 MCG/24HR IUD    MIRENA     1 each by Intrauterine route once        spironolactone 100 MG tablet    ALDACTONE    120 tablet    Take 1 tablet (100 mg) by mouth daily    Acne vulgaris, Excessive hair growth       tiZANidine 4 MG tablet    ZANAFLEX    30 tablet    Take 1-2 tablets (4-8 mg) by mouth nightly as needed    Right arm pain, Closed nondisplaced fracture of head of right radius, initial encounter       tretinoin 0.05 % cream    RETIN-A    45 g    Apply topically At Bedtime    Acne vulgaris

## 2018-10-26 NOTE — NURSING NOTE
Dermatology Rooming Note    Quiana Shepherd's goals for this visit include:   Chief Complaint   Patient presents with     Acne     Quiana is here today for an acne follow up- notes improvment.      JUDE Cedillo

## 2018-12-31 ENCOUNTER — MYC MEDICAL ADVICE (OUTPATIENT)
Dept: DERMATOLOGY | Facility: CLINIC | Age: 33
End: 2018-12-31

## 2018-12-31 DIAGNOSIS — L70.0 ACNE VULGARIS: ICD-10-CM

## 2018-12-31 DIAGNOSIS — L68.9 EXCESSIVE HAIR GROWTH: ICD-10-CM

## 2018-12-31 RX ORDER — SPIRONOLACTONE 100 MG/1
200 TABLET, FILM COATED ORAL DAILY
Qty: 60 TABLET | Refills: 6 | Status: SHIPPED | OUTPATIENT
Start: 2018-12-31 | End: 2019-07-29

## 2019-08-07 ENCOUNTER — TELEPHONE (OUTPATIENT)
Dept: DERMATOLOGY | Facility: CLINIC | Age: 34
End: 2019-08-07

## 2019-08-27 ENCOUNTER — OFFICE VISIT (OUTPATIENT)
Dept: DERMATOLOGY | Facility: CLINIC | Age: 34
End: 2019-08-27
Payer: COMMERCIAL

## 2019-08-27 VITALS — SYSTOLIC BLOOD PRESSURE: 114 MMHG | DIASTOLIC BLOOD PRESSURE: 77 MMHG | HEART RATE: 61 BPM

## 2019-08-27 DIAGNOSIS — L68.9 EXCESSIVE HAIR GROWTH: ICD-10-CM

## 2019-08-27 DIAGNOSIS — D22.9 MULTIPLE BENIGN NEVI: ICD-10-CM

## 2019-08-27 DIAGNOSIS — Z12.83 SKIN CANCER SCREENING: Primary | ICD-10-CM

## 2019-08-27 DIAGNOSIS — L70.0 ACNE VULGARIS: ICD-10-CM

## 2019-08-27 RX ORDER — TRETINOIN 0.5 MG/G
CREAM TOPICAL AT BEDTIME
Qty: 45 G | Refills: 3 | Status: SHIPPED | OUTPATIENT
Start: 2019-08-27 | End: 2020-04-27

## 2019-08-27 RX ORDER — SPIRONOLACTONE 100 MG/1
200 TABLET, FILM COATED ORAL DAILY
Qty: 60 TABLET | Refills: 1 | Status: SHIPPED | OUTPATIENT
Start: 2019-08-27 | End: 2019-12-19

## 2019-08-27 RX ORDER — AZELAIC ACID 0.15 G/G
0.5 GEL TOPICAL 2 TIMES DAILY
Qty: 50 G | Refills: 0 | Status: SHIPPED | OUTPATIENT
Start: 2019-08-27 | End: 2020-04-27

## 2019-08-27 ASSESSMENT — PAIN SCALES - GENERAL: PAINLEVEL: NO PAIN (0)

## 2019-08-27 NOTE — NURSING NOTE
"Dermatology Rooming Note    Quiana Shepherd's goals for this visit include:   Chief Complaint   Patient presents with     Acne     Quiana is here today for an acne follow up- Quiana states \"I need medication refills\".      Skin Check     Quiana is here today for a skin check- Quiana notes no areas of concern.      Kim Norwood, RMAMILCAR     "

## 2019-08-27 NOTE — PATIENT INSTRUCTIONS
Sun protective clothing and Resources     L[place] End (www.Epoq.com)  Athleta (www.athleta.codesy)  Daniela Life ([email or web address].[email or web address])  Carve Designs (Voice Assist) - affordable  Skinz (uvskinz.com)    Long sleeve - Arianna Cool DRI UPF 50 or Mcnary PFG UPF 50  Hoodie - Mcnary PFG UPF 50  Swimshirt/Rash Guard - Adia UPF 50 (on [employer])  Neck - Outdoor Research Ubertubes (www.outdoorresTaamkru.[email or web address])  The ABCDEs of Melanoma    Skin cancer can develop anywhere on the skin. Ask someone for help when checking your skin, especially in hard to see places. If you notice a mole different from others, or that changes, enlarges, itches, or bleeds (even if it is small), you should see a dermatologist.

## 2019-08-27 NOTE — PROGRESS NOTES
"[facility] Dermatology Note      Dermatology Problem List:  1.Acne Vulgaris with hormonal component and secondary hirsutism   -current tx: tretinoin 0.05% cream QHS, azelaic acid 15% gel BID,spironolactone 200mg po every day  -previous tx: spironolactone 100mg every day  And 150mg po every day, tretinoin 0.025% cream  2. Seborrheic Keratosis   3. Fhx melanoma - (mother)    Encounter Date: Aug 27, 2019    CC:  Chief Complaint   Patient presents with     Acne     Quiana is here today for an acne follow up- Quiana states \"I need medication refills\".      Skin Check     Quiana is here today for a skin check- Quiana notes no areas of concern.          History of Present Illness:  Ms. Quiana Shepherd is a 34 year old female who is a return patient to the clinic that presents for a skin check and acne. The patient was last seen on 10/26/18 when her spironolactone dose was increased to 200mg every day from 150mg, tretinoin dosage was increased to 0.05%, and Azeliac acid 15% gel BID was continued for acne vulgaris.     At today's visit, the patient notes she has been on spironolactone for a few years. The patient denies breast tenderness, dizziness and tretinoin. Additionally, the patient states she is currently on tretinoin and azeliac acid and is happy with the results. Her skin is clear and she is tolerating all the medication well. The patient notes a family history of skin cancer (Melanoma - mother). The patient denies any specific areas of concern on her skin. Additionally, the patient notes that increasing the dosage of her spironolactone caused her eyebrows to become blonde, she is wondering if this is related or not.  Denies menstrual irregularities, breast tenderness, or excessive urination.    Past Medical History:   Patient Active Problem List   Diagnosis     Family history of melanoma     Acne vulgaris     Past Medical History:   Diagnosis Date     Acne vulgaris 10/26/2017     No past surgical " history on file.    Social History:   reports that she has never smoked. She has never used smokeless tobacco.    Family History:  Family History   Problem Relation Age of Onset     Cancer Mother         melanoma     Melanoma Mother      Cancer Other         Maternal aunt has a hx of melanoma     Melanoma Other         maternal aunt     Skin Cancer No family hx of        Medications:  Current Outpatient Medications   Medication Sig Dispense Refill     azelaic acid (FINACIA) 15 % external gel Apply 0.5 inches topically 2 times daily Massage thin film gently into afected areas morning and evening. 50 g 0     diclofenac (VOLTAREN) 75 MG EC tablet Take 1 tablet (75 mg) by mouth 2 times daily as needed for moderate pain 30 tablet 1     levonorgestrel (MIRENA) 20 MCG/24HR IUD 1 each by Intrauterine route once       spironolactone (ALDACTONE) 100 MG tablet Take 2 tablets (200 mg) by mouth daily 60 tablet 1     tiZANidine (ZANAFLEX) 4 MG tablet Take 1-2 tablets (4-8 mg) by mouth nightly as needed 30 tablet 1     tretinoin (RETIN-A) 0.05 % external cream Apply topically At Bedtime 45 g 3       No Known Allergies    Review of Systems:  -GI: The patient denies nausea, vomiting, diarrhea or abdominal pain.  -: No changes in menstrual cycle, no breast tenderness, no change in urination  -CVS: no palpitations, dizziness  -Constitutional: The patient denies fatigue, fevers, chills, unintended weight loss, and night sweats.  -HEENT: Patient denies nonhealing oral sores.  -Skin: As above in HPI. No additional skin concerns.    Physical exam:  Vitals: /77 (BP Location: Right arm, Patient Position: Sitting, Cuff Size: Adult Regular)   Pulse 61   GEN: This is a well developed, well-nourished female in no acute distress, in a pleasant mood.    SKIN: Full skin, which includes the head/face, both arms, chest, back, abdomen,both legs, genitalia and/or groin buttocks, digits and/or nails, was examined.  -Bailey's skin type I,  greater than 100 nevi  -facial skin clear, no active papules or pustules, no comedones  -No other lesions of concern on areas examined.     Impression/Plan:  1. Acne vulgaris/Milia with hormonal component and hursiutism - tolerating current regimen very well    Side effects of spironolactone discussed including postural hypotension, increased frequency of urination, breast tenderness, menstrual spotting, cardiac arrythmias and the need for contraception due to fetal feminization.     Continue Spironolactone 200mg every day    Continue tretinoin 0.05% cream at bedtime    Continue azeliac acid 15% gel BID    Refills of spironolactone, tretinoin and azeliac acid provided    2. Family history of Melanoma (mother)    ABCDs of melanoma were discussed and self skin checks were advised.    Sun precaution was advised including the use of sun screens of SPF 30 or higher, sun protective clothing, and avoidance of tanning beds.    Advised yearly skin, dental and eye exams    3. Skin cancer screening    ABCDs of melanoma were discussed and self skin checks were advised.    Sun precaution was advised including the use of sun screens of SPF 30 or higher, sun protective clothing, and avoidance of tanning beds.    Provided sunscreen, melanoma and sun protective clothing handouts    CC Dr. Mayfield on close of this encounter.  Follow-up in 1 year, earlier for new or changing lesions.       Staff Involved:  Staff/Scribe    Scribe Disclosure:  I, Kolby Baxter, am serving as a scribe to document services personally performed by Delfina Rowley PA-C, based on data collection and the provider's statements to me.     Provider Disclosure:   The documentation recorded by the scribe accurately reflects the services I personally performed and the decisions made by me.    All risks, benefits and alternatives were discussed with patient.  Patient is in agreement and understands the assessment and plan.  All questions were answered.    Delfian  CAROLYNE Rowley  [facility]: Phone: [phone], Fax: [phone]

## 2019-08-27 NOTE — LETTER
"8/27/2019       RE: Quiana Shepherd  [address] [facility] [address]     Dear Colleague,    Thank you for referring your patient, Quiana Shepherd, to the [facility] DERMATOLOGY at [facility]. Please see a copy of my visit note below.    [facility] Dermatology Note      Dermatology Problem List:  1.Acne Vulgaris with hormonal component and secondary hirsutism   -current tx: tretinoin 0.05% cream QHS, azelaic acid 15% gel BID,spironolactone 200mg po every day  -previous tx: spironolactone 100mg every day  And 150mg po every day, tretinoin 0.025% cream  2. Seborrheic Keratosis   3. Fhx melanoma - (mother)    Encounter Date: Aug 27, 2019    CC:  Chief Complaint   Patient presents with     Acne     Quiana is here today for an acne follow up- Quiana states \"I need medication refills\".      Skin Check     Quiana is here today for a skin check- Quiana notes no areas of concern.          History of Present Illness:  Ms. Quiana Shepherd is a 34 year old female who is a return patient to the clinic that presents for a skin check and acne. The patient was last seen on 10/26/18 when her spironolactone dose was increased to 200mg every day from 150mg, tretinoin dosage was increased to 0.05%, and Azeliac acid 15% gel BID was continued for acne vulgaris.     At today's visit, the patient notes she has been on spironolactone for a few years. The patient denies breast tenderness, dizziness and tretinoin. Additionally, the patient states she is currently on tretinoin and azeliac acid and is happy with the results. Her skin is clear and she is tolerating all the medication well. The patient notes a family history of skin cancer (Melanoma - mother). The patient denies any specific areas of concern on her skin. Additionally, the patient notes that increasing the dosage of her spironolactone caused her eyebrows to become blonde, she is wondering if this is related or not.  Denies " menstrual irregularities, breast tenderness, or excessive urination.    Past Medical History:   Patient Active Problem List   Diagnosis     Family history of melanoma     Acne vulgaris     Past Medical History:   Diagnosis Date     Acne vulgaris 10/26/2017     No past surgical history on file.    Social History:   reports that she has never smoked. She has never used smokeless tobacco.    Family History:  Family History   Problem Relation Age of Onset     Cancer Mother         melanoma     Melanoma Mother      Cancer Other         Maternal aunt has a hx of melanoma     Melanoma Other         maternal aunt     Skin Cancer No family hx of        Medications:  Current Outpatient Medications   Medication Sig Dispense Refill     azelaic acid (FINACIA) 15 % external gel Apply 0.5 inches topically 2 times daily Massage thin film gently into afected areas morning and evening. 50 g 0     diclofenac (VOLTAREN) 75 MG EC tablet Take 1 tablet (75 mg) by mouth 2 times daily as needed for moderate pain 30 tablet 1     levonorgestrel (MIRENA) 20 MCG/24HR IUD 1 each by Intrauterine route once       spironolactone (ALDACTONE) 100 MG tablet Take 2 tablets (200 mg) by mouth daily 60 tablet 1     tiZANidine (ZANAFLEX) 4 MG tablet Take 1-2 tablets (4-8 mg) by mouth nightly as needed 30 tablet 1     tretinoin (RETIN-A) 0.05 % external cream Apply topically At Bedtime 45 g 3       No Known Allergies    Review of Systems:  -GI: The patient denies nausea, vomiting, diarrhea or abdominal pain.  -: No changes in menstrual cycle, no breast tenderness, no change in urination  -CVS: no palpitations, dizziness  -Constitutional: The patient denies fatigue, fevers, chills, unintended weight loss, and night sweats.  -HEENT: Patient denies nonhealing oral sores.  -Skin: As above in HPI. No additional skin concerns.    Physical exam:  Vitals: /77 (BP Location: Right arm, Patient Position: Sitting, Cuff Size: Adult Regular)   Pulse 61   GEN:  This is a well developed, well-nourished female in no acute distress, in a pleasant mood.    SKIN: Full skin, which includes the head/face, both arms, chest, back, abdomen,both legs, genitalia and/or groin buttocks, digits and/or nails, was examined.  -Bailey's skin type I, greater than 100 nevi  -facial skin clear, no active papules or pustules, no comedones  -No other lesions of concern on areas examined.     Impression/Plan:  1. Acne vulgaris/Milia with hormonal component and hursiutism - tolerating current regimen very well    Side effects of spironolactone discussed including postural hypotension, increased frequency of urination, breast tenderness, menstrual spotting, cardiac arrythmias and the need for contraception due to fetal feminization.     Continue Spironolactone 200mg every day    Continue tretinoin 0.05% cream at bedtime    Continue azeliac acid 15% gel BID    Refills of spironolactone, tretinoin and azeliac acid provided    2. Family history of Melanoma (mother)    ABCDs of melanoma were discussed and self skin checks were advised.    Sun precaution was advised including the use of sun screens of SPF 30 or higher, sun protective clothing, and avoidance of tanning beds.    Advised yearly skin, dental and eye exams    3. Skin cancer screening    ABCDs of melanoma were discussed and self skin checks were advised.    Sun precaution was advised including the use of sun screens of SPF 30 or higher, sun protective clothing, and avoidance of tanning beds.    Provided sunscreen, melanoma and sun protective clothing handouts    CC Dr. Mayfield on close of this encounter.  Follow-up in 1 year, earlier for new or changing lesions.       Staff Involved:  Staff/Scribe    Scribe Disclosure:  Kolby CONWAY, am serving as a scribe to document services personally performed by Delfina Rowley PA-C, based on data collection and the provider's statements to me.     Provider Disclosure:   The documentation recorded  by the scribe accurately reflects the services I personally performed and the decisions made by me.    All risks, benefits and alternatives were discussed with patient.  Patient is in agreement and understands the assessment and plan.  All questions were answered.    Delfina Rowley PA-C  [facility]: Phone: [phone], Fax: [phone]

## 2019-11-06 ENCOUNTER — HEALTH MAINTENANCE LETTER (OUTPATIENT)
Age: 34
End: 2019-11-06

## 2019-12-19 DIAGNOSIS — L68.9 EXCESSIVE HAIR GROWTH: ICD-10-CM

## 2019-12-19 DIAGNOSIS — L70.0 ACNE VULGARIS: ICD-10-CM

## 2019-12-19 RX ORDER — SPIRONOLACTONE 100 MG/1
200 TABLET, FILM COATED ORAL DAILY
Qty: 60 TABLET | Refills: 1 | Status: SHIPPED | OUTPATIENT
Start: 2019-12-19 | End: 2020-02-20

## 2020-02-20 DIAGNOSIS — L68.9 EXCESSIVE HAIR GROWTH: ICD-10-CM

## 2020-02-20 DIAGNOSIS — L70.0 ACNE VULGARIS: ICD-10-CM

## 2020-02-20 RX ORDER — SPIRONOLACTONE 100 MG/1
200 TABLET, FILM COATED ORAL DAILY
Qty: 60 TABLET | Refills: 1 | Status: SHIPPED | OUTPATIENT
Start: 2020-02-20 | End: 2020-04-27

## 2020-02-20 NOTE — TELEPHONE ENCOUNTER
spironolactone (ALDACTONE) 100 MG tablet   Last Written Prescription Date:  12/19/19  Last Fill Quantity: 60,   # refills: 1  Last Office Visit : 8/27/19  Future Office visit: none    Routing refill request to provider for review/approval because: creat, K+, sodium

## 2020-04-16 DIAGNOSIS — L68.9 EXCESSIVE HAIR GROWTH: ICD-10-CM

## 2020-04-16 DIAGNOSIS — L70.0 ACNE VULGARIS: ICD-10-CM

## 2020-04-17 NOTE — TELEPHONE ENCOUNTER
spironolactone (ALDACTONE) 100 MG tablet       Last Written Prescription Date:  2/20/20  Last Fill Quantity: 60,   # refills: 1  Last Office Visit : 8/27/19 with recommended 1 year follow up  Future Office visit:  None scheduled    Routing refill request to provider for review/approval because:  No creatinine, sodium or potassium within the last 12 months, nothing found in care everywhere nor media

## 2020-04-20 RX ORDER — SPIRONOLACTONE 100 MG/1
200 TABLET, FILM COATED ORAL DAILY
Qty: 180 TABLET | Refills: 1 | OUTPATIENT
Start: 2020-04-20

## 2020-04-21 DIAGNOSIS — L68.9 EXCESSIVE HAIR GROWTH: ICD-10-CM

## 2020-04-21 DIAGNOSIS — L70.0 ACNE VULGARIS: ICD-10-CM

## 2020-04-21 NOTE — TELEPHONE ENCOUNTER
spironolactone 100 MG PO tablet      Last Written Prescription Date: 2/20/20  Last Fill Quantity: 60,   # refills: 1  Last Office Visit :8/27/19  Future Office visit: 4/27/20    Route because: creat . K+, NA not found.

## 2020-04-21 NOTE — TELEPHONE ENCOUNTER
[facility] Call Center    Phone Message    May a detailed message be left on voicemail: yes     Reason for Call: Medication Refill Request    Has the patient contacted the pharmacy for the refill? Yes   Name of medication being requested: spironolactone 100 MG PO tablet     Provider who prescribed the medication: Delfina Rowley  Pharmacy: [facility] - [place], [place] - [address]    Date medication is needed: ASAP - patient has 1 dose         Action Taken: Message routed to:  [facility] ([facility] Derm    Travel Screening: Not Applicable

## 2020-04-22 RX ORDER — SPIRONOLACTONE 100 MG/1
200 TABLET, FILM COATED ORAL DAILY
Qty: 60 TABLET | Refills: 1 | OUTPATIENT
Start: 2020-04-22

## 2020-04-27 ENCOUNTER — VIRTUAL VISIT (OUTPATIENT)
Dept: DERMATOLOGY | Facility: CLINIC | Age: 35
End: 2020-04-27
Payer: COMMERCIAL

## 2020-04-27 DIAGNOSIS — Z80.8 FAMILY HISTORY OF MELANOMA: Primary | ICD-10-CM

## 2020-04-27 DIAGNOSIS — L70.0 ACNE VULGARIS: ICD-10-CM

## 2020-04-27 DIAGNOSIS — L68.9 EXCESSIVE HAIR GROWTH: ICD-10-CM

## 2020-04-27 RX ORDER — SPIRONOLACTONE 100 MG/1
200 TABLET, FILM COATED ORAL DAILY
Qty: 120 TABLET | Refills: 5 | Status: SHIPPED | OUTPATIENT
Start: 2020-04-27 | End: 2021-04-26

## 2020-04-27 RX ORDER — TRETINOIN 0.5 MG/G
CREAM TOPICAL AT BEDTIME
Qty: 45 G | Refills: 3 | Status: SHIPPED | OUTPATIENT
Start: 2020-04-27 | End: 2021-04-26

## 2020-04-27 RX ORDER — TRETINOIN 0.25 MG/G
CREAM TOPICAL
Qty: 45 G | Refills: 3 | Status: SHIPPED | OUTPATIENT
Start: 2020-04-27 | End: 2021-04-26

## 2020-04-27 ASSESSMENT — PAIN SCALES - GENERAL: PAINLEVEL: NO PAIN (0)

## 2020-04-27 NOTE — PATIENT INSTRUCTIONS
[facility] Teledermatology Visit    Thank you for allowing us to participate in your care. Your findings, instructions and follow-up plan are as follows:    1. Acne and facial hair growth  -continue current treatment regimen  -continue with sun protection and contraceptive methods    2. Family History of melanoma  -please have you skin check done as soon as we are able to see patients back in the clinic - you are due in August, but sometime this fall or winter would be approriate    When should I call my doctor?    If you are worsening or not improving, please, contact us or seek urgent care as noted below.     Who should I call with questions (adults)?    [facility] (adult and pediatric): [phone]     [facility] (adult): [phone]    For urgent needs outside of business hours call the [facility] at [phone] and ask for the dermatology resident on call    If this is a medical emergency and you are unable to reach an ER, Call [phone]      Who should I call with questions (pediatric)?  [facility]- Pediatric Dermatology  Dr. Tova Morse, Dr. Brooke Quigley, Dr. Clarisa Clinton, Pao Wells, [place]  Dr. Delaney Guerrero, Dr. Nika Mayfield & Dr. Nii Evertet  Non Urgent  Nurse Triage Line; [phone]- Marly and Elise [facility] Care Coordinators   Jessica (/Complex ) [phone]    If you need a prescription refill, please contact your pharmacy. Refills are approved or denied by our Physicians during normal business hours, Monday through Fridays  Per office policy, refills will not be granted if you have not been seen within the past year (or sooner depending on your child's condition)    Scheduling Information:  Pediatric Appointment Scheduling and Call Center [phone]  Radiology Scheduling- [phone]  Sedation Unit Scheduling- [phone]  [place] Scheduling-  General [phone]; Pediatric Dermatology [phone]  Main  Services: [phone]  Turkmen: [phone]  Ethiopian: [phone]  Hmong/Yahir/Belarusian: [phone]  Preadmission Nursing Department Fax Number: [phone] (Fax all pre-operative paperwork to this number)    For urgent matters arising during evenings, weekends, or holidays that cannot wait for normal business hours please call [phone] and ask for the Dermatology Resident On-Call to be paged.

## 2020-04-27 NOTE — PROGRESS NOTES
[facility]:  Video: ( Invitation sent by:  Shar )      Impression and Recommendations (Patient Counseled on the Following):  1. Hormonal Acne and Hirsutism - responding very well to spironolactone 200mg daily  -Continue spironolactone 200mg daily  -Continue current contraceptive - IUD  -Continue with retinoids - alternating tretinoin 0.05% and 0.025% creams in the winter/summer  -Continue with sun protection, at least SPF 30+ daily    2. Fhx melanoma (mother)  -plan for annual skin check this fall/winter once we are able to see patients back in the clinic      Follow-up:   Follow-up with dermatology in approximately 5mo (for FBSE) or 1 year for acne. Earlier for new or changing lesions or rash.      Staff only:    All risks, benefits and alternatives were discussed with patient.  Patient is in agreement and understands the assessment and plan.  All questions were answered.    Delfina Rowley PA-C, MPAS  [facility] [place]: Phone: [phone], Fax: [phone]  [facility]: Phone: [phone],  Fax: [phone]              _____________________________________________________________________________    Dermatology Problem List:  1.Acne Vulgaris with hormonal component and secondary hirsutism   -current tx: tretinoin 0.05% cream at bedtime alternating with tretinoin 0.025% cream,spironolactone 200mg po every day  -previous tx: spironolactone 100mg every day and 150mg po every day  2. Seborrheic Keratosis   3. Fhx melanoma - (mother)       Encounter Date: Apr 27, 2020    CC:   Chief Complaint   Patient presents with     Follow Up     Quiana, is participating in a telephone visit today regarding a follow up medication refill,no concerns at this time, as reported by patient.       History of Present Illness:  I have reviewed the teledermatology information and the nursing intake corresponding to this issue. Quiana  Cora is a 34 year old female who presents via teledermatology for an acne follow-up and hirsutism.    At today's visit, the patient notes she has been on spironolactone for a few years. The patient denies breast tenderness, dizziness and heart palpitations. Additionally, the patient states she is currently on tretinoin 0.025% cream and is happy with the results. Plant to begin using her tretinoin 0.05% cream soon now that her skin is less dry and it is summer time. Her skin is clear and she is tolerating all the medication well. The patient notes a family history of skin cancer (Melanoma - mother). She had her last skin exam in August 2019. The patient denies any specific areas of concern on her skin. Additionally, the patient notes that the hair on her chin which previously was dark is now light/blonde and she is happy with that change. Continue with daily moisturizers BID and a facial CBD oil serum. Denies menstrual irregularities, breast tenderness, or excessive urination.      ROS: Patient is generally feeling well today   -GI: The patient denies nausea, vomiting, diarrhea or abdominal pain.  -: No changes in menstrual cycle, no breast tenderness, no change in urination  -CVS: no palpitations, dizziness  -Constitutional: The patient denies fatigue, fevers, chills, unintended weight loss, and night sweats.  -HEENT: Patient denies nonhealing oral sores.  -Skin: As above in HPI. No additional skin concerns.    Physical Examination:  General: Well-appearing, appropriately-developed individual.  Skin: Focused examination within the teledermatology photograph(s) including face was performed.   -no active papules, no visible dark hairs on the face - however patient does pluck      Past Medical History:   Patient Active Problem List   Diagnosis     Family history of melanoma     Acne vulgaris     Past Medical History:   Diagnosis Date     Acne vulgaris 10/26/2017     No past surgical history on file.    Social  History:  Patient reports that she has never smoked. She has never used smokeless tobacco.    Family History:  Family History   Problem Relation Age of Onset     Cancer Mother         melanoma     Melanoma Mother      Cancer Other         Maternal aunt has a hx of melanoma     Melanoma Other         maternal aunt     Skin Cancer No family hx of        Medications:  Current Outpatient Medications   Medication     levonorgestrel (MIRENA) 20 MCG/24HR IUD     spironolactone 100 MG PO tablet     tretinoin (RETIN-A) 0.05 % external cream     azelaic acid (FINACIA) 15 % external gel     diclofenac (VOLTAREN) 75 MG EC tablet     tiZANidine (ZANAFLEX) 4 MG tablet     No current facility-administered medications for this visit.           No Known Allergies      _____________________________________________________________________________    Teledermatology information:  - Location of patient: Home  - Patient presented as: return  - Location of teledermatologist:  ([facility] DERMATOLOGY )  - Reason teledermatology is appropriate:  of National Emergency Regarding Coronavirus disease (COVID 19) Outbreak  - Image quality and interpretability: acceptable  - Physician has received verbal consent for a Video/Photos Visit from the patient? Yes  - In-person dermatology visit recommendation: no  - Date of images: 4/27/20  - Service start time:9:45am  - Service end time:9:52am  - Date of report: 4/27/2020

## 2020-04-27 NOTE — NURSING NOTE
Dermatology Rooming Note    Quiana Shepherd's goals for this visit include:   Chief Complaint   Patient presents with     Follow Up     Quiana, is participating in a telephone visit today regarding a follow up medication refill,no concerns at this time, as reported by patient.     Natasha Staples LPN

## 2020-11-29 ENCOUNTER — HEALTH MAINTENANCE LETTER (OUTPATIENT)
Age: 35
End: 2020-11-29

## 2021-02-11 NOTE — PROGRESS NOTES
[facility] Dermatology Note    Dermatology Problem List:  1. Atypical nevi - right superior breast, right medial thigh s/p biopsy 3/31/15  2. Family history of melanoma in the patient's mother  3. Acne vulgaris  -tretinoin 0.025% cream, spironolactone, azelaic acid 15% gel, BPO facial wash    CC:   Chief Complaint   Patient presents with     Derm Problem     Acne. Quiana notes improvement with her acne. She notes that she is still having in-grown hairs.     Date of Service: Apr 4, 2017    History of Present Illness:  Ms. Quiana Shepherd is a 31 year old female with a family history of melanoma in her mother presents for a follow up for acne. The patient was last seen on 1/4/17 when she continued spironolactone, azelaic acid 15% gel, and benzoyl peroxide face wash. Today the patient reports that her acne has improved greatly since the last visit. She had one larger acne lesion on the ride side of her face and she did have another in grown hair on her chin in the same area as she had one in the past. She denies light headedness, spotting in between intramenstrual cycle, or breast tenderness. She denies any dryness or redness with the topical medications. The patient reports no other lesions of concern at this time.    Otherwise, the patient reports no painful, bleeding, nonhealing, or pruritic lesions, and denies new or changing moles.    Past Medical History:   Patient Active Problem List   Diagnosis     Family history of melanoma     No past medical history on file.    Past Surgical History:   Procedure Laterality Date     MOHS MICROGRAPHIC PROCEDURE       Social History:  The patient admits to using tanning beds when she was younger. The patient works in behavior counseling.    Family History:  There is a family history of melanoma in the patient's mother. Most of the melanoma in the patient's mother has been found within the last few years.    Medications:  Current Outpatient Prescriptions    Medication Sig Dispense Refill     spironolactone (ALDACTONE) 25 MG tablet Take 4 tablets (100 mg) by mouth daily 120 tablet 2     tretinoin (RETIN-A) 0.025 % cream Apply a pea sized amount to a clean and dry face nightly. 45 g 11     Azelaic Acid 15 % gel Apply 0.5 inches topically 2 times daily Massage thin film gently into afected areas morning and evening. 50 g 11     levonorgestrel (MIRENA) 20 MCG/24HR IUD 1 each by Intrauterine route once       Allergies:  No Known Allergies    Review of Systems:  - Skin: As above in HPI. No additional skin concerns.    Physical exam:  Vitals: /86 (BP Location: Right arm)  Pulse 85  GEN: This is a well developed, well-nourished female in no acute distress, in a pleasant mood.      SKIN: Acne exam, which includes the face, neck, upper central chest, and upper central back was performed.  - Resolving inflammatory papule on the right preauricular cheek  - One scar on right jaw line  - One resolving inflammatory papule on the left chin  - Upper chest and back are clear  - No other lesions of concern on areas examined.     Impression/Plan:  1. Family history of melanoma in the patient's mother.    2. Personal history of atypical nevi  - reassurance provided; no lesions concerning for malignancy on limited exam today  - photoprotection (regular use of SPF30+ broad spectrum sunscreen and sun protective clothing) recommended  - ABCDE of melanoma discussed    3. Acne vulgaris, comedonal and inflammatory with hormonal accentuation  - Continue tretinoin 0.025% cream - apply a pea size amount to a clean and dry face nightly.   - Continue spironolactone - take 100 mg by mouth daily. Discussion that if the patient becomes pregnant to stop the medication. Discussion to not take Bactrim when on this medication.   - Continue azelaic acid 15% gel - apply 0.5 inches topically 2 times daily  - Continue benzoyl peroxide face wash    Follow-up in 6 months, earlier for new or chaning  lesions..     Staff Involved:  Staff Only    Scribe Disclosure:   I, Reyna Miguelsharyn, am serving as a scribe to document services personally performed by Dr. Paolo Arenas, based on data collection and the provider's statements to me.     Staff attestation:  The documentation recorded by the scribe accurately reflects the services I personally performed and the decisions I personally made.    Paolo Arenas MD  Staff Dermatologist    Department of Dermatology     Head atraumatic, normal cephalic shape.

## 2021-04-26 ENCOUNTER — OFFICE VISIT (OUTPATIENT)
Dept: DERMATOLOGY | Facility: CLINIC | Age: 36
End: 2021-04-26
Payer: COMMERCIAL

## 2021-04-26 DIAGNOSIS — D22.9 MULTIPLE BENIGN NEVI: ICD-10-CM

## 2021-04-26 DIAGNOSIS — Z80.8 FAMILY HISTORY OF MELANOMA: Primary | ICD-10-CM

## 2021-04-26 DIAGNOSIS — L68.9 EXCESSIVE HAIR GROWTH: ICD-10-CM

## 2021-04-26 DIAGNOSIS — L70.0 ACNE VULGARIS: ICD-10-CM

## 2021-04-26 PROCEDURE — 99213 OFFICE O/P EST LOW 20 MIN: CPT | Performed by: PHYSICIAN ASSISTANT

## 2021-04-26 RX ORDER — SPIRONOLACTONE 100 MG/1
200 TABLET, FILM COATED ORAL DAILY
Qty: 120 TABLET | Refills: 5 | Status: SHIPPED | OUTPATIENT
Start: 2021-04-26 | End: 2022-03-16

## 2021-04-26 RX ORDER — TRETINOIN 0.25 MG/G
CREAM TOPICAL
Qty: 45 G | Refills: 3 | Status: SHIPPED | OUTPATIENT
Start: 2021-04-26 | End: 2022-03-16

## 2021-04-26 RX ORDER — TRETINOIN 0.5 MG/G
CREAM TOPICAL AT BEDTIME
Qty: 45 G | Refills: 3 | Status: SHIPPED | OUTPATIENT
Start: 2021-04-26 | End: 2022-03-16

## 2021-04-26 ASSESSMENT — PAIN SCALES - GENERAL: PAINLEVEL: NO PAIN (0)

## 2021-04-26 NOTE — LETTER
4/26/2021       RE: Quiana Shepherd  [address] [facility] [address]     Dear Colleague,    Thank you for referring your patient, Quiana Shepherd, to the [facility] [place] at [facility]. Please see a copy of my visit note below.    [facility] Dermatology Note  Encounter Date: Apr 26, 2021  Office Visit      Dermatology Problem List:  1.Acne Vulgaris with hormonal component and secondary hirsutism   -current tx: tretinoin 0.05% cream at bedtime alternating with tretinoin 0.025% cream, spironolactone 200mg po every day  -previous tx: spironolactone 100mg every day and 150mg po every day  2. Seborrheic Keratosis   3. Fhx melanoma - (mother)  4. Hx DN  -R superior breast - mild atypia - excised with bx  -R medial thigh - mild to moderate atypia - excised with bx  ____________________________________________    Assessment & Plan:  # FHx melanoma (mother)/Multiple benign nevi.   - ABCDEs: Counseled ABCDEs of melanoma: Asymmetry, Border (irregularity), Color (not uniform, changes in color), Diameter (greater than 6 mm which is about the size of a pencil eraser), and Evolving (any changes in preexisting moles).  - Monitor: Patient to continue monitoring at home and will contact the clinic for any changes.  - Sun protection: Counseled SPF30+ sunscreen, UPF clothing, sun avoidance, tanning bed avoidance.  - Continue with annual skin exams     # Hormonal Acne and Hirsutism - responding very well to spironolactone 200mg daily  -Continue spironolactone 200mg daily  -Continue current contraceptive - IUD  -Continue with retinoids - alternating tretinoin 0.05% and 0.025% creams in the winter/summer  -Continue with sun protection, at least SPF 30+ daily  -Refills of each provided today    Procedures Performed:   none    Follow-up: 1 year(s) in-person, or earlier for new or changing lesions    Staff:     All risks, benefits and  alternatives were discussed with patient.  Patient is in agreement and understands the assessment and plan.  All questions were answered.    Delfina Rowley PA-C, MPAS  [facility] [place]: Phone: [phone], Fax: [phone]  [facility]: Phone: [phone],  Fax: [phone]  ____________________________________________    CC: Skin Check (Annual - no spots of concern today.)      HPI:  Ms. Quiana Shepherd is a 35 year old female who presents today as a return patient for a FBSE. Fhx melanoma (mother). No areas of concern. Notes acne is doing great, very stable on current regimen and would like to continue. Hair growth is responsive as well. Reports no side effects. Has IUD. She does have hx of DNs (both excised with bx) in the past, most recent in 2015.    Patient is otherwise feeling well, without additional concerns.    Labs:    Physical Exam:  Vitals: There were no vitals taken for this visit.  SKIN: Full skin, which includes the head/face, both arms, chest, back, abdomen,both legs, genitalia and/or groin buttocks, digits and/or nails, was examined.   - Multiple regular brown pigmented macules and papules are identified on the trunk and extremities.   -face is clear, no acneiform papules or comedones  -Bailey's skin type I-II, less than 100 nevi    - No other lesions of concern on areas examined.     Medications:  Current Outpatient Medications   Medication     levonorgestrel (MIRENA) 20 MCG/24HR IUD     spironolactone (ALDACTONE) 100 MG tablet     tretinoin (RETIN-A) 0.025 % external cream     tretinoin (RETIN-A) 0.05 % external cream     No current facility-administered medications for this visit.       Past Medical/Surgical History:   Patient Active Problem List   Diagnosis     Family history of melanoma     Acne vulgaris     Past Medical History:   Diagnosis Date     Acne vulgaris 10/26/2017     CC Dr. Lomax on close  of this encounter.

## 2021-04-26 NOTE — NURSING NOTE
Dermatology Rooming Note    Quiana Shepherd's goals for this visit include:   Chief Complaint   Patient presents with     Skin Check     Annual - no spots of concern today.     Lety Gan, CMA

## 2021-04-26 NOTE — PROGRESS NOTES
[facility] Dermatology Note  Encounter Date: Apr 26, 2021  Office Visit      Dermatology Problem List:  1.Acne Vulgaris with hormonal component and secondary hirsutism   -current tx: tretinoin 0.05% cream at bedtime alternating with tretinoin 0.025% cream, spironolactone 200mg po every day  -previous tx: spironolactone 100mg every day and 150mg po every day  2. Seborrheic Keratosis   3. Fhx melanoma - (mother)  4. Hx DN  -R superior breast - mild atypia - excised with bx  -R medial thigh - mild to moderate atypia - excised with bx  ____________________________________________    Assessment & Plan:  # FHx melanoma (mother)/Multiple benign nevi.   - ABCDEs: Counseled ABCDEs of melanoma: Asymmetry, Border (irregularity), Color (not uniform, changes in color), Diameter (greater than 6 mm which is about the size of a pencil eraser), and Evolving (any changes in preexisting moles).  - Monitor: Patient to continue monitoring at home and will contact the clinic for any changes.  - Sun protection: Counseled SPF30+ sunscreen, UPF clothing, sun avoidance, tanning bed avoidance.  - Continue with annual skin exams     # Hormonal Acne and Hirsutism - responding very well to spironolactone 200mg daily  -Continue spironolactone 200mg daily  -Continue current contraceptive - IUD  -Continue with retinoids - alternating tretinoin 0.05% and 0.025% creams in the winter/summer  -Continue with sun protection, at least SPF 30+ daily  -Refills of each provided today    Procedures Performed:   none    Follow-up: 1 year(s) in-person, or earlier for new or changing lesions    Staff:     All risks, benefits and alternatives were discussed with patient.  Patient is in agreement and understands the assessment and plan.  All questions were answered.    Delfina Rowley PA-C, MPAS  [facility]: Phone: [phone], Fax: [phone]  [facility]  [place]: Phone: [phone],  Fax: [phone]  ____________________________________________    CC: Skin Check (Annual - no spots of concern today.)      HPI:  Ms. Quiana Shepherd is a 35 year old female who presents today as a return patient for a FBSE. Fhx melanoma (mother). No areas of concern. Notes acne is doing great, very stable on current regimen and would like to continue. Hair growth is responsive as well. Reports no side effects. Has IUD. She does have hx of DNs (both excised with bx) in the past, most recent in 2015.    Patient is otherwise feeling well, without additional concerns.    Labs:    Physical Exam:  Vitals: There were no vitals taken for this visit.  SKIN: Full skin, which includes the head/face, both arms, chest, back, abdomen,both legs, genitalia and/or groin buttocks, digits and/or nails, was examined.   - Multiple regular brown pigmented macules and papules are identified on the trunk and extremities.   -face is clear, no acneiform papules or comedones  -Bailey's skin type I-II, less than 100 nevi    - No other lesions of concern on areas examined.     Medications:  Current Outpatient Medications   Medication     levonorgestrel (MIRENA) 20 MCG/24HR IUD     spironolactone (ALDACTONE) 100 MG tablet     tretinoin (RETIN-A) 0.025 % external cream     tretinoin (RETIN-A) 0.05 % external cream     No current facility-administered medications for this visit.       Past Medical/Surgical History:   Patient Active Problem List   Diagnosis     Family history of melanoma     Acne vulgaris     Past Medical History:   Diagnosis Date     Acne vulgaris 10/26/2017     CC Dr. Lomax on close of this encounter.

## 2021-07-08 ENCOUNTER — OFFICE VISIT (OUTPATIENT)
Dept: ORTHOPEDICS | Facility: CLINIC | Age: 36
End: 2021-07-08
Payer: COMMERCIAL

## 2021-07-08 ENCOUNTER — ANCILLARY PROCEDURE (OUTPATIENT)
Dept: GENERAL RADIOLOGY | Facility: CLINIC | Age: 36
End: 2021-07-08
Attending: ORTHOPAEDIC SURGERY
Payer: COMMERCIAL

## 2021-07-08 VITALS — HEIGHT: 67 IN | WEIGHT: 160 LBS | BODY MASS INDEX: 25.11 KG/M2

## 2021-07-08 DIAGNOSIS — M25.552 LEFT HIP PAIN: Primary | ICD-10-CM

## 2021-07-08 DIAGNOSIS — M53.3 SI (SACROILIAC) JOINT DYSFUNCTION: ICD-10-CM

## 2021-07-08 DIAGNOSIS — Z20.822 ENCOUNTER FOR LABORATORY TESTING FOR COVID-19 VIRUS: ICD-10-CM

## 2021-07-08 PROCEDURE — 99204 OFFICE O/P NEW MOD 45 MIN: CPT | Performed by: ORTHOPAEDIC SURGERY

## 2021-07-08 PROCEDURE — 73502 X-RAY EXAM HIP UNI 2-3 VIEWS: CPT | Mod: LT | Performed by: RADIOLOGY

## 2021-07-08 ASSESSMENT — ENCOUNTER SYMPTOMS
ARTHRALGIAS: 1
STIFFNESS: 0
JOINT SWELLING: 0
MUSCLE WEAKNESS: 0
BACK PAIN: 1
MYALGIAS: 1
MUSCLE CRAMPS: 1
NECK PAIN: 0

## 2021-07-08 ASSESSMENT — ACTIVITIES OF DAILY LIVING (ADL)
ADL_SUBSCALE_SCORE: 94.11
ADL_MEAN: .23
ADL_SUM: 4

## 2021-07-08 ASSESSMENT — MIFFLIN-ST. JEOR: SCORE: 1448.39

## 2021-07-08 NOTE — NURSING NOTE
"Reason For Visit:   Chief Complaint   Patient presents with     RECHECK     has been having some back pain that has shifted down into her lower back into her glute, she stated that she has been having some deep pain in her glute that radiates laterally down her leg to about mid tight. has been seeing a chiropractor that she shoudl come back in to get some imaging.        Ht 1.702 m (5' 7\")   Wt 72.6 kg (160 lb)   BMI 25.06 kg/m           Raheem Whipple ATC  "

## 2021-07-08 NOTE — LETTER
7/8/2021         RE: Quiana Shepherd  [address] [facility] [address]        Dear Colleague,    Thank you for referring your patient, Quiana Shepherd, to the [facility] [place]. Please see a copy of my visit note below.    Assessment: This is a 36 year old with two pain generators: hip and and SI joint. Both physical examination are positive. Reports that the posterior symptoms are the majority (90%) are posterior and reproduced with SI tests. There are also changes visible at the inferior SI joint on her pelvis x-ray. Radiographically her hip appears stable though there are subtle changes compared to 10-11 years ago. We discussed SI injection versus hip injection. She would like to try the SI based on the location of her symptoms.     Plan:  Left SI joint steroid injection.         Chief Complaint: RECHECK (has been having some back pain that has shifted down into her lower back into her glute, she stated that she has been having some deep pain in her glute that radiates laterally down her leg to about mid tight. has been seeing a chiropractor that she shoudl come back in to get some imaging. )      Physician:  No ref. provider found    HPI: Quiana Shepherd is a 36 year old female who presents today for evaluation of    Symptom Profile  Location of symptoms:  Low back to right buttock, left side with shooting pain from buttcock down lateral thigh to knee  Onset: insidious  Trend: getting  worse  Duration of symptoms:  Quality of symptoms: spasms   Severity: mild   Alleviate:  Exacerbating:   Previous Treatments: Previous treatments include activity modification, oral pain medication,     Current Status:  Results of the patient s Hip Disability and Osteoarthritis Outcome Score (HOOS)  are as follows (0-100 scales with 100 being the theoretical best):  Pain: 72.5  Symptoms: 90  ADLs: 94.11  Sports/Recreation: 75  Quality of Life:62.5  (http://koos.nu/)  [facility] Activity Score:  "7    MEDICAL HISTORY:   Past Medical History:   Diagnosis Date     Acne vulgaris 10/26/2017       Medications:     Current Outpatient Medications:      levonorgestrel (MIRENA) 20 MCG/24HR IUD, 1 each by Intrauterine route once, Disp: , Rfl:      spironolactone (ALDACTONE) 100 MG tablet, Take 2 tablets (200 mg) by mouth daily, Disp: 120 tablet, Rfl: 5     tretinoin (RETIN-A) 0.025 % external cream, Use every night, Disp: 45 g, Rfl: 3     tretinoin (RETIN-A) 0.05 % external cream, Apply topically At Bedtime, Disp: 45 g, Rfl: 3    Allergies: Patient has no known allergies.    SURGICAL HISTORY: History reviewed. No pertinent surgical history.    FAMILY HISTORY:   Family History   Problem Relation Age of Onset     Cancer Mother         melanoma     Melanoma Mother      Cancer Other         Maternal aunt has a hx of melanoma     Melanoma Other         maternal aunt     Skin Cancer No family hx of        SOCIAL HISTORY:   Social History     Tobacco Use     Smoking status: Never Smoker     Smokeless tobacco: Never Used   Substance Use Topics     Alcohol use: Not on file   working at [employer]    REVIEW OF SYSTEMS:  The comprehensive review of systems from the intake form was reviewed with the patient.  No fever, weight change or fatigue. No dry eyes. No oral ulcers, sore throat or voice change. No palpitations, syncope, angina or edema.  No chest pain, excessive sleepiness, shortness of breath or hemoptysis.   No abdominal pain, nausea, vomiting, diarrhea or heartburn.  No skin rash. No focal weakness or numbness. No bleeding or lymphadenopathy. No rhinitis or hives.     Exam:  On physical examination the patient appears the stated age, is in no acute distress, affectThe is appropriate, and breathing is non-labored.  Vitals are documented in the EMR and have been reviewed:    Ht 1.702 m (5' 7\")   Wt 72.6 kg (160 lb)   BMI 25.06 kg/m    5' 7\"  Body mass index is 25.06 kg/m .    Rises from chair: easily "   Gait: normal   Trendelenburg test: normal   Gains the exam table: easily     LEFT hip subjective: not irritated   Abd: 40  Add: 20  PFC:  Flexion: 120  IRF:15  ERF: 40  Impingement test: + groin     Distally, the circulatory, motor, and sensation exam is intact with 5/5 EHL, gastroc-soleus, and tibialis anterior.  Sensation to light touch is intact.  Dorsalis pedis and posterior tibialis pulses are palpable.  There are no sores on the feet, no bruising, and no lymphedema.    X-rays:   No interval change versus subtle degenerative changes left compared to 2011. Inferior SI joint osteophyte increased since 2011.       Answers for HPI/ROS submitted by the patient on 7/8/2021   General Symptoms: No  Skin Symptoms: No  HENT Symptoms: No  EYE SYMPTOMS: No  HEART SYMPTOMS: No  LUNG SYMPTOMS: No  INTESTINAL SYMPTOMS: No  URINARY SYMPTOMS: No  GYNECOLOGIC SYMPTOMS: No  BREAST SYMPTOMS: No  SKELETAL SYMPTOMS: Yes  BLOOD SYMPTOMS: No  NERVOUS SYSTEM SYMPTOMS: No  MENTAL HEALTH SYMPTOMS: No  Back pain: Yes  Muscle aches: Yes  Neck pain: No  Swollen joints: No  Joint pain: Yes  Bone pain: No  Muscle cramps: Yes  Muscle weakness: No  Joint stiffness: No  Bone fracture: No

## 2021-07-08 NOTE — PROGRESS NOTES
Assessment: This is a 36 year old with two pain generators: hip and and SI joint. Both physical examination are positive. Reports that the posterior symptoms are the majority (90%) are posterior and reproduced with SI tests. There are also changes visible at the inferior SI joint on her pelvis x-ray. Radiographically her hip appears stable though there are subtle changes compared to 10-11 years ago. We discussed SI injection versus hip injection. She would like to try the SI based on the location of her symptoms.     Plan:  Left SI joint steroid injection.         Chief Complaint: RECHECK (has been having some back pain that has shifted down into her lower back into her glute, she stated that she has been having some deep pain in her glute that radiates laterally down her leg to about mid tight. has been seeing a chiropractor that she shoudl come back in to get some imaging. )      Physician:  No ref. provider found    HPI: Quiana Shepherd is a 36 year old female who presents today for evaluation of    Symptom Profile  Location of symptoms:  Low back to right buttock, left side with shooting pain from buttcock down lateral thigh to knee  Onset: insidious  Trend: getting  worse  Duration of symptoms:  Quality of symptoms: spasms   Severity: mild   Alleviate:  Exacerbating:   Previous Treatments: Previous treatments include activity modification, oral pain medication,     Current Status:  Results of the patient s Hip Disability and Osteoarthritis Outcome Score (HOOS)  are as follows (0-100 scales with 100 being the theoretical best):  Pain: 72.5  Symptoms: 90  ADLs: 94.11  Sports/Recreation: 75  Quality of Life:62.5  (http://koos.nu/)  [facility] Activity Score: 7    MEDICAL HISTORY:   Past Medical History:   Diagnosis Date     Acne vulgaris 10/26/2017       Medications:     Current Outpatient Medications:      levonorgestrel (MIRENA) 20 MCG/24HR IUD, 1 each by Intrauterine route once, Disp: , Rfl:      spironolactone  "(ALDACTONE) 100 MG tablet, Take 2 tablets (200 mg) by mouth daily, Disp: 120 tablet, Rfl: 5     tretinoin (RETIN-A) 0.025 % external cream, Use every night, Disp: 45 g, Rfl: 3     tretinoin (RETIN-A) 0.05 % external cream, Apply topically At Bedtime, Disp: 45 g, Rfl: 3    Allergies: Patient has no known allergies.    SURGICAL HISTORY: History reviewed. No pertinent surgical history.    FAMILY HISTORY:   Family History   Problem Relation Age of Onset     Cancer Mother         melanoma     Melanoma Mother      Cancer Other         Maternal aunt has a hx of melanoma     Melanoma Other         maternal aunt     Skin Cancer No family hx of        SOCIAL HISTORY:   Social History     Tobacco Use     Smoking status: Never Smoker     Smokeless tobacco: Never Used   Substance Use Topics     Alcohol use: Not on file   working at [employer]    REVIEW OF SYSTEMS:  The comprehensive review of systems from the intake form was reviewed with the patient.  No fever, weight change or fatigue. No dry eyes. No oral ulcers, sore throat or voice change. No palpitations, syncope, angina or edema.  No chest pain, excessive sleepiness, shortness of breath or hemoptysis.   No abdominal pain, nausea, vomiting, diarrhea or heartburn.  No skin rash. No focal weakness or numbness. No bleeding or lymphadenopathy. No rhinitis or hives.     Exam:  On physical examination the patient appears the stated age, is in no acute distress, affectThe is appropriate, and breathing is non-labored.  Vitals are documented in the EMR and have been reviewed:    Ht 1.702 m (5' 7\")   Wt 72.6 kg (160 lb)   BMI 25.06 kg/m    5' 7\"  Body mass index is 25.06 kg/m .    Rises from chair: easily   Gait: normal   Trendelenburg test: normal   Gains the exam table: easily     LEFT hip subjective: not irritated   Abd: 40  Add: 20  PFC:  Flexion: 120  IRF:15  ERF: 40  Impingement test: + groin     Distally, the circulatory, motor, and sensation exam is intact " with 5/5 EHL, gastroc-soleus, and tibialis anterior.  Sensation to light touch is intact.  Dorsalis pedis and posterior tibialis pulses are palpable.  There are no sores on the feet, no bruising, and no lymphedema.    X-rays:   No interval change versus subtle degenerative changes left compared to 2011. Inferior SI joint osteophyte increased since 2011.       Answers for HPI/ROS submitted by the patient on 7/8/2021   General Symptoms: No  Skin Symptoms: No  HENT Symptoms: No  EYE SYMPTOMS: No  HEART SYMPTOMS: No  LUNG SYMPTOMS: No  INTESTINAL SYMPTOMS: No  URINARY SYMPTOMS: No  GYNECOLOGIC SYMPTOMS: No  BREAST SYMPTOMS: No  SKELETAL SYMPTOMS: Yes  BLOOD SYMPTOMS: No  NERVOUS SYSTEM SYMPTOMS: No  MENTAL HEALTH SYMPTOMS: No  Back pain: Yes  Muscle aches: Yes  Neck pain: No  Swollen joints: No  Joint pain: Yes  Bone pain: No  Muscle cramps: Yes  Muscle weakness: No  Joint stiffness: No  Bone fracture: No

## 2021-07-09 DIAGNOSIS — Z20.822 ENCOUNTER FOR LABORATORY TESTING FOR COVID-19 VIRUS: ICD-10-CM

## 2021-07-09 LAB
SARS-COV-2 RNA RESP QL NAA+PROBE: NORMAL
SPECIMEN SOURCE: NORMAL

## 2021-07-09 PROCEDURE — U0005 INFEC AGEN DETEC AMPLI PROBE: HCPCS | Mod: 90 | Performed by: PATHOLOGY

## 2021-07-09 PROCEDURE — U0003 INFECTIOUS AGENT DETECTION BY NUCLEIC ACID (DNA OR RNA); SEVERE ACUTE RESPIRATORY SYNDROME CORONAVIRUS 2 (SARS-COV-2) (CORONAVIRUS DISEASE [COVID-19]), AMPLIFIED PROBE TECHNIQUE, MAKING USE OF HIGH THROUGHPUT TECHNOLOGIES AS DESCRIBED BY CMS-2020-01-R: HCPCS | Mod: 90 | Performed by: PATHOLOGY

## 2021-07-10 LAB
LABORATORY COMMENT REPORT: NORMAL
SARS-COV-2 RNA RESP QL NAA+PROBE: NEGATIVE
SPECIMEN SOURCE: NORMAL

## 2021-07-12 ENCOUNTER — HOSPITAL ENCOUNTER (OUTPATIENT)
Facility: CLINIC | Age: 36
Discharge: HOME | End: 2021-07-12
Attending: ORTHOPAEDIC SURGERY | Admitting: RADIOLOGY
Payer: COMMERCIAL

## 2021-07-12 ENCOUNTER — HOSPITAL ENCOUNTER (OUTPATIENT)
Dept: CT IMAGING | Facility: CLINIC | Age: 36
End: 2021-07-12
Attending: ORTHOPAEDIC SURGERY | Admitting: ORTHOPAEDIC SURGERY
Payer: COMMERCIAL

## 2021-07-12 DIAGNOSIS — M53.3 SI (SACROILIAC) JOINT DYSFUNCTION: ICD-10-CM

## 2021-07-12 PROCEDURE — 27096 INJECT SACROILIAC JOINT: CPT | Mod: LT | Performed by: RADIOLOGY

## 2021-07-12 PROCEDURE — 272N000431 CT SACROILIAC THERAPEUTIC JOINT INJECTION

## 2021-07-12 PROCEDURE — 250N000009 HC RX 250: Performed by: ORTHOPAEDIC SURGERY

## 2021-07-12 PROCEDURE — 250N000011 HC RX IP 250 OP 636: Performed by: ORTHOPAEDIC SURGERY

## 2021-07-12 RX ORDER — BUPIVACAINE HYDROCHLORIDE 2.5 MG/ML
1 INJECTION, SOLUTION INFILTRATION; PERINEURAL ONCE
Status: COMPLETED | OUTPATIENT
Start: 2021-07-12 | End: 2021-07-12

## 2021-07-12 RX ORDER — TRIAMCINOLONE ACETONIDE 40 MG/ML
40 INJECTION, SUSPENSION INTRA-ARTICULAR; INTRAMUSCULAR ONCE
Status: COMPLETED | OUTPATIENT
Start: 2021-07-12 | End: 2021-07-12

## 2021-07-12 RX ADMIN — LIDOCAINE HYDROCHLORIDE 5 ML: 10 INJECTION, SOLUTION EPIDURAL; INFILTRATION; INTRACAUDAL; PERINEURAL at 14:11

## 2021-07-12 RX ADMIN — BUPIVACAINE HYDROCHLORIDE 1 ML: 2.5 INJECTION, SOLUTION EPIDURAL; INFILTRATION; INTRACAUDAL; PERINEURAL at 13:48

## 2021-07-12 RX ADMIN — TRIAMCINOLONE ACETONIDE 40 MG: 40 INJECTION, SUSPENSION INTRA-ARTICULAR; INTRAMUSCULAR at 13:47

## 2021-07-12 NOTE — DISCHARGE INSTRUCTIONS
IR ORTHOPEDIC DISCHARGE INSTRUCTIONS    _ Epidural Steroid Injection (LUISA) _ Facet Injection _ Joint Aspiration  _ Arthrogram _ Nerve Root Injection X Joint Injection    The Radiologist who performed your procedure: Dr. Sauceda    Guidelines for Inpatients and Outpatients:    You should let pain be your guide as to the extent of your activities, maintaining any activity       limitations as prescribed by your physician    You may experience increased or different pain over the next 24-48 hours at the injection site    You may use ice packs for 15 minutes 3 to 4 times a day for 48 hours for injection site pain    If you should have new numbness down your leg post injection, this is temporary and may last up to 6 hours. You may need assistance with activity until your leg has normal sensation    If you have a bandage on your puncture site, you may remove it the next morning. You may resume showering the next day    No tub baths, whirlpools, or swimming for 48 hours    If you experience any severe pain, fever, or new problems with your bowel or bladder control, contact your Physician or go to the Emergency Room    Inpatients, resume your previous diet and medications if approved by your Physician    DO NOT drive a motor vehicle until morning    For steroid medication injections:    A steroid was injected to help decrease swelling and may help reduce pain. This may take up to 3-7 days to obtain results. (See back of page for more information.)    Guidelines for Outpatients:  _ Resume your regular diet  _ Resume all your other previous medications, including your coumadin, today at your regular dose.  Restart your platelet inhibitors and Aspirin tomorrow  _ If you are restarting Coumadin, check with your Physician to have your INR checked  _ Do not drive a motor vehicle until morning  _ If you were given IV sedation:  ? Do not drink alcoholic beverages  ? You may experience side effects which could include drowsiness,  temporary forgetfulness and  unsteadiness while walking      [facility], [facility] Radiology Department....................................................[phone]      Instructor___________________________________________________________________    Person Receiving Instructions_________________________________________________    DATE:______________________________ TIME: ___________________________________          Information about your Steroid Injection    Possible side effects of your steroid injection are temporary and mild and will go away in 2 to 3 days as the body absorbs the steroid.    Some common side effects may include:  ? Insomnia  ? Heartburn  ? Flushed face  ? Water retention  ? Increased appetite  ? Increased blood sugar    Monitor your blood sugar closely if you are diabetic. Contact your Physician tohelp you control your blood sugar if needed.    In some patients a single injection will give permanent relief. Some patients may require multiple injections, up to 3 for maximum effectiveness. If you have more than one steroid injection, they should be spaced 2 weeks apart. Some patients receive NO relief of symptoms from the steroid injection.

## 2021-07-12 NOTE — PROGRESS NOTES
"Pt has a Mirena and declines pre-procedural pregnancy test stating \"there's no way I'm pregnant\".    "

## 2021-07-12 NOTE — IP AVS SNAPSHOT
formerly [facility]  [address] [address]  Phone: [phone]                                    After Visit Summary   7/12/2021    Quiana Shepherd    MRN: [id number]           After Visit Summary Signature Page    I have received my discharge instructions, and my questions have been answered. I have discussed any challenges I see with this plan with the nurse or doctor.    ..........................................................................................................................................  Patient/Patient Representative Signature      ..........................................................................................................................................  Patient Representative Print Name and Relationship to Patient    ..................................................               ................................................  Date                                   Time    ..........................................................................................................................................  Reviewed by Signature/Title    ...................................................              ..............................................  Date                                               Time          22EPIC Rev 08/18

## 2021-07-12 NOTE — PROGRESS NOTES
Pt had left sacroiliac joint injection done by Dr. Sauceda.  Pt denies pain post-procedure, ambulating without difficulty.  Discharge instructions discussed and she verbalizes knowledge.  Discharged to home.

## 2021-07-12 NOTE — PROCEDURES
[facility] Brief Procedure Note    Pre-operative diagnosis: Left SI joint pain   Post-operative diagnosis Same   Procedure: CT-guided left SI joint injection   Surgeon: Chika Sauceda MD   Assistants(s): -   Estimated blood loss: None        Findings: Uneventful CT-guided injection of 1 cc bupivacaine and 40 mg Kenalog steroid (2 cc total) into posterior aspect of lower third of left SI joint.  Pre-procedure pain score: 3/10, preprocedure pain score: 1/10.   Complications: None.

## 2021-08-10 ENCOUNTER — MYC MEDICAL ADVICE (OUTPATIENT)
Dept: ORTHOPEDICS | Facility: CLINIC | Age: 36
End: 2021-08-10

## 2021-08-11 DIAGNOSIS — M25.552 LEFT HIP PAIN: Primary | ICD-10-CM

## 2021-08-11 DIAGNOSIS — M53.3 SI (SACROILIAC) JOINT DYSFUNCTION: ICD-10-CM

## 2021-08-11 NOTE — TELEPHONE ENCOUNTER
Patient was called back and Dr. Mohamud's Team was consulted.  She was given the names of Natanael Parker here at the [facility] and Natanael BOND, Femi BACK, and Antoine SUTTON at the [facility] PT location. She was thankful for the call back and will reach out to schedule PT.

## 2021-08-11 NOTE — TELEPHONE ENCOUNTER
[facility] Call Center    Phone Message    May a detailed message be left on voicemail: yes     Reason for Call: Other: This pt of Dr. Mohamud's called to schedule a PT appt with Lindsey Delaney. The next opening with Lindsey is not until 10/25. I called the clinic directly who confirmed that this was correct and there is little wiggle room in Lindsey's schedule. The pt asked that I send a message to Dr. Mohamud and his care team to see if there is another provider that Dr. Mohamud recommends that the pt sees. Please have someone on Dr. Mohamud's care team reach back out to this pt with the names of alternate providers to schedule PT with.     Action Taken: Message routed to:  [facility] Ortho    Travel Screening: Not Applicable

## 2021-08-27 ENCOUNTER — THERAPY VISIT (OUTPATIENT)
Dept: PHYSICAL THERAPY | Facility: CLINIC | Age: 36
End: 2021-08-27
Attending: ORTHOPAEDIC SURGERY
Payer: COMMERCIAL

## 2021-08-27 DIAGNOSIS — M25.552 LEFT HIP PAIN: ICD-10-CM

## 2021-08-27 DIAGNOSIS — M53.3 SI (SACROILIAC) JOINT DYSFUNCTION: ICD-10-CM

## 2021-08-27 PROCEDURE — 97112 NEUROMUSCULAR REEDUCATION: CPT | Mod: GP | Performed by: PHYSICAL THERAPIST

## 2021-08-27 PROCEDURE — 97161 PT EVAL LOW COMPLEX 20 MIN: CPT | Mod: GP | Performed by: PHYSICAL THERAPIST

## 2021-08-27 PROCEDURE — 97110 THERAPEUTIC EXERCISES: CPT | Mod: GP | Performed by: PHYSICAL THERAPIST

## 2021-08-27 ASSESSMENT — ACTIVITIES OF DAILY LIVING (ADL)
STANDING_FOR_15_MINUTES: NO DIFFICULTY AT ALL
RECREATIONAL_ACTIVITIES: MODERATE DIFFICULTY
TWISTING/PIVOTING_ON_INVOLVED_LEG: SLIGHT DIFFICULTY
WALKING_DOWN_STEEP_HILLS: NO DIFFICULTY AT ALL
WALKING_15_MINUTES_OR_GREATER: MODERATE DIFFICULTY
LIGHT_TO_MODERATE_WORK: MODERATE DIFFICULTY
SITTING_FOR_15_MINUTES: NO DIFFICULTY AT ALL
WALKING_INITIALLY: SLIGHT DIFFICULTY
PUTTING_ON_SOCKS_AND_SHOES: SLIGHT DIFFICULTY
HEAVY_WORK: MODERATE DIFFICULTY
HOW_WOULD_YOU_RATE_YOUR_CURRENT_LEVEL_OF_FUNCTION_DURING_YOUR_USUAL_ACTIVITIES_OF_DAILY_LIVING_FROM_0_TO_100_WITH_100_BEING_YOUR_LEVEL_OF_FUNCTION_PRIOR_TO_YOUR_HIP_PROBLEM_AND_0_BEING_THE_INABILITY_TO_PERFORM_ANY_OF_YOUR_USUAL_DAILY_ACTIVITIES?: 70
DEEP_SQUATTING: MODERATE DIFFICULTY
WALKING_UP_STEEP_HILLS: MODERATE DIFFICULTY
GETTING_INTO_AND_OUT_OF_AN_AVERAGE_CAR: SLIGHT DIFFICULTY
WALKING_APPROXIMATELY_10_MINUTES: SLIGHT DIFFICULTY
STEPPING_UP_AND_DOWN_CURBS: SLIGHT DIFFICULTY
ROLLING_OVER_IN_BED: SLIGHT DIFFICULTY
GOING_DOWN_1_FLIGHT_OF_STAIRS: NO DIFFICULTY AT ALL

## 2021-08-27 NOTE — PROGRESS NOTES
Physical Therapy Initial Evaluation  Subjective:    Patient Health History  Quiana Shepherd being seen for Left hip joint pain on hip that's had TIFFANIE surgery 10 years ago.     Problem began: 7/12/2021.   Problem occurred: Unsure - either exercise or loss of muscle strength   Pain is reported as 3/10 on pain scale.  General health as reported by patient is good.  Pertinent medical history includes: osteoarthritis.     Medical allergies: none.   Surgeries include:  Orthopedic surgery.    Current medications:  Other. Other medications details: Oral acne medication, contraception.    Current occupation is Instructor.   Primary job tasks include:  Computer work and prolonged sitting.                                  [facility]  Physical Therapy Initial Examination/Evaluation  August 27, 2021    Quiana Shepherd is a 36 year old  female referred to physical therapy by Dr. Mohamud for treatment of hip, SI with Precautions/Restrictions/MD instructions none    KEY PT FINDINGS:  1.  Significant lower abdominal weakness  2.  S/p TIFFANIE correction 2011 - progressed to mild-mod hip OA; recent SIJ injection  3.  Offloads L hip during squatting  4.  Minimal trunk rotation with gait, antalgic gait pattern    Subjective:  Referring MD visit date: 7/11/21  DOI/onset: one year ago  Mechanism of injury: TIFFANIE surgery in 2011.   DOS 2011  Previous treatment: chiropractor  Imaging: xrays  Chief Complaint:   Pain with going up stairs, prolonged sitting, unable to run.   Pain: best 2 /10, worst 4/10 L SI, L groin Described as: aching, shooting Alleviated by: rest, lying on stomach Frequency: constant Progression of symptoms since initial onset: worsening Time of day when pain is worse: not related  Sleeping: turning in bed    Occupation: teach - public health at [employer]  Job duties:  Sitting    Current HEP/exercise regimen: biking  Patient's goals are reduce pain, strengthen    Pertinent PMH: see [facility]   General Health  Reported by Patient: excellent  Return to MD:  PRN         Lower Extremity Exam    Dynamic Movement Screen:  2 leg stance: WNL, slightly toed out  2 leg squat:offloads L LE  Spine rotation in stance: poor segmental dissociation noted with rotation     1 leg stance:   Right: normal  Left: normal    1 leg squat:   Right: normal  Left: proprioceptive challenge and excessive contralateral pelvic drop    Gait: minimal rotation through trunk, antalgic    Hip Joint PROM Screen   Right Left Difference   Flex 120 deg 100 deg 20 deg   ER 80 deg 80 deg 0 deg   IR 30 deg 20 deg 10 deg     Lower Extremity Muscle Strength (x/5)   Right Left   Hip ER 4+/5 4/5   Hip IR 4+/5 4+/5   Hip ABD 4+/5 4/5   Hip Ext 5-/5 4+/5   Knee Flex 5/5 5/5     Lower Extremity Flexibility Screen:   Right Left   Hamstring - -   PATRICIA - -   FADIR - ++   - = normal  + = mild tightness  ++ = moderate tightness  +++ = significant tightness      Assessment/Plan      Patient is a 36 year old female with sacral and left side hip complaints.    Patient has the following significant findings with corresponding treatment plan.                Diagnosis 1:   Left hip, SI joint pain    Pain -  hot/cold therapy, US, electric stimulation, manual therapy, splint/taping/bracing/orthotics, self management, education, and home program  Decreased ROM/flexibility - manual therapy and therapeutic exercise  Decreased joint mobility - manual therapy and therapeutic exercise  Decreased strength - therapeutic exercise and therapeutic activities  Impaired balance - neuro re-education and therapeutic activities  Decreased proprioception - neuro re-education and therapeutic activities  Inflammation - cold therapy  Edema - vasopneumatics  Impaired gait - gait training  Impaired muscle performance - neuro re-education  Decreased function - therapeutic activities    Therapy Evaluation Codes:   1) History comprised of:   Personal factors that impact the plan of care:      None.     Comorbidity factors that impact the plan of care are:      None.     Medications impacting care: None.  2) Examination of Body Systems comprised of:   Body structures and functions that impact the plan of care:      Hip and Sacral illiac joint.   Activity limitations that impact the plan of care are:      Sitting, Squatting/kneeling, Stairs, Standing and Walking.  3) Clinical presentation characteristics are:   Stable/Uncomplicated.  4) Decision-Making    Low complexity using standardized patient assessment instrument and/or measureable assessment of functional outcome.  Cumulative Therapy Evaluation is: Low complexity.    Previous and current functional limitations:  (See Goal Flow Sheet for this information)    Short term and Long term goals: (See Goal Flow Sheet for this information)     Communication ability:  Patient appears to be able to clearly communicate and understand verbal and written communication and follow directions correctly.  Treatment Explanation - The following has been discussed with the patient:   RX ordered/plan of care  Anticipated outcomes  Possible risks and side effects  This patient would benefit from PT intervention to resume normal activities.   Rehab potential is good.    Frequency:  2 X month, once daily  Duration:  for 3 months  Discharge Plan:  Achieve all LTG.  Independent in home treatment program.  Reach maximal therapeutic benefit.    Please refer to the daily flowsheet for treatment today, total treatment time and time spent performing 1:1 timed codes.

## 2021-09-19 ENCOUNTER — HEALTH MAINTENANCE LETTER (OUTPATIENT)
Age: 36
End: 2021-09-19

## 2021-10-07 ENCOUNTER — THERAPY VISIT (OUTPATIENT)
Dept: PHYSICAL THERAPY | Facility: CLINIC | Age: 36
End: 2021-10-07
Payer: COMMERCIAL

## 2021-10-07 DIAGNOSIS — M53.3 SI (SACROILIAC) JOINT DYSFUNCTION: ICD-10-CM

## 2021-10-07 DIAGNOSIS — M25.552 LEFT HIP PAIN: Primary | ICD-10-CM

## 2021-10-07 PROCEDURE — 97110 THERAPEUTIC EXERCISES: CPT | Mod: GP | Performed by: PHYSICAL THERAPIST

## 2021-10-07 PROCEDURE — 97112 NEUROMUSCULAR REEDUCATION: CPT | Mod: GP | Performed by: PHYSICAL THERAPIST

## 2021-10-07 PROCEDURE — 97530 THERAPEUTIC ACTIVITIES: CPT | Mod: GP | Performed by: PHYSICAL THERAPIST

## 2021-10-28 ENCOUNTER — THERAPY VISIT (OUTPATIENT)
Dept: PHYSICAL THERAPY | Facility: CLINIC | Age: 36
End: 2021-10-28
Payer: COMMERCIAL

## 2021-10-28 DIAGNOSIS — M25.552 LEFT HIP PAIN: Primary | ICD-10-CM

## 2021-10-28 DIAGNOSIS — M53.3 SI (SACROILIAC) JOINT DYSFUNCTION: ICD-10-CM

## 2021-10-28 PROCEDURE — 97112 NEUROMUSCULAR REEDUCATION: CPT | Mod: GP | Performed by: PHYSICAL THERAPIST

## 2021-10-28 PROCEDURE — 97110 THERAPEUTIC EXERCISES: CPT | Mod: GP | Performed by: PHYSICAL THERAPIST

## 2021-10-28 PROCEDURE — 97530 THERAPEUTIC ACTIVITIES: CPT | Mod: GP | Performed by: PHYSICAL THERAPIST

## 2021-10-28 ASSESSMENT — ACTIVITIES OF DAILY LIVING (ADL)
HOS_ADL_HIGHEST_POTENTIAL_SCORE: 56
HOW_WOULD_YOU_RATE_YOUR_CURRENT_LEVEL_OF_FUNCTION_DURING_YOUR_USUAL_ACTIVITIES_OF_DAILY_LIVING_FROM_0_TO_100_WITH_100_BEING_YOUR_LEVEL_OF_FUNCTION_PRIOR_TO_YOUR_HIP_PROBLEM_AND_0_BEING_THE_INABILITY_TO_PERFORM_ANY_OF_YOUR_USUAL_DAILY_ACTIVITIES?: 75
LIGHT_TO_MODERATE_WORK: SLIGHT DIFFICULTY
WALKING_15_MINUTES_OR_GREATER: SLIGHT DIFFICULTY
HOS_ADL_ITEM_SCORE_TOTAL: 45
SITTING_FOR_15_MINUTES: SLIGHT DIFFICULTY
PUTTING_ON_SOCKS_AND_SHOES: NO DIFFICULTY AT ALL
GOING_UP_1_FLIGHT_OF_STAIRS: SLIGHT DIFFICULTY
WALKING_APPROXIMATELY_10_MINUTES: SLIGHT DIFFICULTY
GETTING_INTO_AND_OUT_OF_AN_AVERAGE_CAR: SLIGHT DIFFICULTY
TWISTING/PIVOTING_ON_INVOLVED_LEG: MODERATE DIFFICULTY
HEAVY_WORK: SLIGHT DIFFICULTY
GOING_DOWN_1_FLIGHT_OF_STAIRS: NO DIFFICULTY AT ALL
HOS_ADL_SCORE(%): 80.36
STEPPING_UP_AND_DOWN_CURBS: NO DIFFICULTY AT ALL
ROLLING_OVER_IN_BED: SLIGHT DIFFICULTY
RECREATIONAL_ACTIVITIES: SLIGHT DIFFICULTY
STANDING_FOR_15_MINUTES: NO DIFFICULTY AT ALL
WALKING_INITIALLY: NO DIFFICULTY AT ALL
DEEP_SQUATTING: SLIGHT DIFFICULTY
HOS_ADL_COUNT: 14

## 2021-11-11 ENCOUNTER — THERAPY VISIT (OUTPATIENT)
Dept: PHYSICAL THERAPY | Facility: CLINIC | Age: 36
End: 2021-11-11
Payer: COMMERCIAL

## 2021-11-11 DIAGNOSIS — M53.3 SI (SACROILIAC) JOINT DYSFUNCTION: ICD-10-CM

## 2021-11-11 DIAGNOSIS — M25.552 LEFT HIP PAIN: Primary | ICD-10-CM

## 2021-11-11 PROCEDURE — 97140 MANUAL THERAPY 1/> REGIONS: CPT | Mod: GP | Performed by: PHYSICAL THERAPIST

## 2021-11-11 PROCEDURE — 97112 NEUROMUSCULAR REEDUCATION: CPT | Mod: GP | Performed by: PHYSICAL THERAPIST

## 2021-11-11 PROCEDURE — 97110 THERAPEUTIC EXERCISES: CPT | Mod: GP | Performed by: PHYSICAL THERAPIST

## 2021-12-02 ENCOUNTER — THERAPY VISIT (OUTPATIENT)
Dept: PHYSICAL THERAPY | Facility: CLINIC | Age: 36
End: 2021-12-02
Payer: COMMERCIAL

## 2021-12-02 DIAGNOSIS — M25.552 LEFT HIP PAIN: Primary | ICD-10-CM

## 2021-12-02 DIAGNOSIS — M53.3 SI (SACROILIAC) JOINT DYSFUNCTION: ICD-10-CM

## 2021-12-02 PROCEDURE — 97110 THERAPEUTIC EXERCISES: CPT | Mod: GP | Performed by: PHYSICAL THERAPIST

## 2021-12-02 PROCEDURE — 97140 MANUAL THERAPY 1/> REGIONS: CPT | Mod: GP | Performed by: PHYSICAL THERAPIST

## 2021-12-02 PROCEDURE — 97112 NEUROMUSCULAR REEDUCATION: CPT | Mod: GP | Performed by: PHYSICAL THERAPIST

## 2021-12-21 ENCOUNTER — THERAPY VISIT (OUTPATIENT)
Dept: PHYSICAL THERAPY | Facility: CLINIC | Age: 36
End: 2021-12-21
Payer: COMMERCIAL

## 2021-12-21 DIAGNOSIS — M25.552 LEFT HIP PAIN: ICD-10-CM

## 2021-12-21 DIAGNOSIS — M53.3 SI (SACROILIAC) JOINT DYSFUNCTION: Primary | ICD-10-CM

## 2021-12-21 PROCEDURE — 97140 MANUAL THERAPY 1/> REGIONS: CPT | Mod: GP | Performed by: PHYSICAL THERAPIST

## 2021-12-21 PROCEDURE — 97112 NEUROMUSCULAR REEDUCATION: CPT | Mod: GP | Performed by: PHYSICAL THERAPIST

## 2021-12-21 PROCEDURE — 97110 THERAPEUTIC EXERCISES: CPT | Mod: GP | Performed by: PHYSICAL THERAPIST

## 2022-01-09 ENCOUNTER — HEALTH MAINTENANCE LETTER (OUTPATIENT)
Age: 37
End: 2022-01-09

## 2022-02-08 ENCOUNTER — THERAPY VISIT (OUTPATIENT)
Dept: PHYSICAL THERAPY | Facility: CLINIC | Age: 37
End: 2022-02-08
Payer: COMMERCIAL

## 2022-02-08 DIAGNOSIS — M25.552 LEFT HIP PAIN: ICD-10-CM

## 2022-02-08 DIAGNOSIS — M53.3 SI (SACROILIAC) JOINT DYSFUNCTION: Primary | ICD-10-CM

## 2022-02-08 PROCEDURE — 97140 MANUAL THERAPY 1/> REGIONS: CPT | Mod: GP | Performed by: PHYSICAL THERAPIST

## 2022-02-08 PROCEDURE — 97530 THERAPEUTIC ACTIVITIES: CPT | Mod: GP | Performed by: PHYSICAL THERAPIST

## 2022-03-16 DIAGNOSIS — L70.0 ACNE VULGARIS: ICD-10-CM

## 2022-03-16 DIAGNOSIS — L68.9 EXCESSIVE HAIR GROWTH: ICD-10-CM

## 2022-03-16 RX ORDER — TRETINOIN 0.5 MG/G
CREAM TOPICAL AT BEDTIME
Qty: 20 G | Refills: 0 | Status: SHIPPED | OUTPATIENT
Start: 2022-03-16 | End: 2022-08-18

## 2022-03-16 RX ORDER — SPIRONOLACTONE 100 MG/1
200 TABLET, FILM COATED ORAL DAILY
Qty: 120 TABLET | Refills: 3 | Status: SHIPPED | OUTPATIENT
Start: 2022-03-16 | End: 2022-08-18

## 2022-03-16 RX ORDER — TRETINOIN 0.25 MG/G
CREAM TOPICAL
Qty: 20 G | Refills: 0 | Status: SHIPPED | OUTPATIENT
Start: 2022-03-16 | End: 2022-08-18

## 2022-04-05 ENCOUNTER — THERAPY VISIT (OUTPATIENT)
Dept: PHYSICAL THERAPY | Facility: CLINIC | Age: 37
End: 2022-04-05
Payer: COMMERCIAL

## 2022-04-05 DIAGNOSIS — M53.3 SI (SACROILIAC) JOINT DYSFUNCTION: Primary | ICD-10-CM

## 2022-04-05 DIAGNOSIS — M25.552 LEFT HIP PAIN: ICD-10-CM

## 2022-04-05 PROCEDURE — 97112 NEUROMUSCULAR REEDUCATION: CPT | Mod: GP | Performed by: PHYSICAL THERAPIST

## 2022-04-05 PROCEDURE — 97530 THERAPEUTIC ACTIVITIES: CPT | Mod: GP | Performed by: PHYSICAL THERAPIST

## 2022-04-05 PROCEDURE — 97110 THERAPEUTIC EXERCISES: CPT | Mod: GP | Performed by: PHYSICAL THERAPIST

## 2022-04-05 ASSESSMENT — ACTIVITIES OF DAILY LIVING (ADL)
STEPPING_UP_AND_DOWN_CURBS: SLIGHT DIFFICULTY
GOING_DOWN_1_FLIGHT_OF_STAIRS: NO DIFFICULTY AT ALL
HOS_ADL_HIGHEST_POTENTIAL_SCORE: 68
HOS_ADL_COUNT: 17
GETTING_INTO_AND_OUT_OF_AN_AVERAGE_CAR: SLIGHT DIFFICULTY
WALKING_UP_STEEP_HILLS: SLIGHT DIFFICULTY
ROLLING_OVER_IN_BED: SLIGHT DIFFICULTY
SITTING_FOR_15_MINUTES: NO DIFFICULTY AT ALL
WALKING_15_MINUTES_OR_GREATER: SLIGHT DIFFICULTY
GOING_UP_1_FLIGHT_OF_STAIRS: NO DIFFICULTY AT ALL
WALKING_INITIALLY: NO DIFFICULTY AT ALL
WALKING_APPROXIMATELY_10_MINUTES: NO DIFFICULTY AT ALL
STANDING_FOR_15_MINUTES: NO DIFFICULTY AT ALL
HOW_WOULD_YOU_RATE_YOUR_CURRENT_LEVEL_OF_FUNCTION_DURING_YOUR_USUAL_ACTIVITIES_OF_DAILY_LIVING_FROM_0_TO_100_WITH_100_BEING_YOUR_LEVEL_OF_FUNCTION_PRIOR_TO_YOUR_HIP_PROBLEM_AND_0_BEING_THE_INABILITY_TO_PERFORM_ANY_OF_YOUR_USUAL_DAILY_ACTIVITIES?: 90
HOS_ADL_SCORE(%): 86.76
TWISTING/PIVOTING_ON_INVOLVED_LEG: SLIGHT DIFFICULTY
HEAVY_WORK: SLIGHT DIFFICULTY
DEEP_SQUATTING: NO DIFFICULTY AT ALL
GETTING_INTO_AND_OUT_OF_A_BATHTUB: NO DIFFICULTY AT ALL
RECREATIONAL_ACTIVITIES: SLIGHT DIFFICULTY
WALKING_DOWN_STEEP_HILLS: SLIGHT DIFFICULTY
PUTTING_ON_SOCKS_AND_SHOES: NO DIFFICULTY AT ALL
LIGHT_TO_MODERATE_WORK: NO DIFFICULTY AT ALL
HOS_ADL_ITEM_SCORE_TOTAL: 59

## 2022-06-06 ENCOUNTER — THERAPY VISIT (OUTPATIENT)
Dept: PHYSICAL THERAPY | Facility: CLINIC | Age: 37
End: 2022-06-06
Payer: COMMERCIAL

## 2022-06-06 DIAGNOSIS — M25.552 LEFT HIP PAIN: ICD-10-CM

## 2022-06-06 DIAGNOSIS — M53.3 SI (SACROILIAC) JOINT DYSFUNCTION: Primary | ICD-10-CM

## 2022-06-06 PROCEDURE — 97530 THERAPEUTIC ACTIVITIES: CPT | Mod: GP | Performed by: PHYSICAL THERAPIST

## 2022-06-06 PROCEDURE — 97112 NEUROMUSCULAR REEDUCATION: CPT | Mod: GP | Performed by: PHYSICAL THERAPIST

## 2022-06-06 NOTE — PROGRESS NOTES
PROGRESS  REPORT    Progress reporting period is from 4/5/22 to 6/6/22.       SUBJECTIVE   Subjective: Overall progress over the past two months, but recently had an acute onset of R knee pain with swelling and warmth which has since resolved.  She notes that her anterior hip on the L became more painful after that.  SI joint pain has resolved.     Changes in function:  Yes (See Goal flowsheet attached for changes in current functional level)  Adverse reaction to treatment or activity: None    OBJECTIVE  Changes noted in objective findings:  Yes  Objective: MMT glute med 5/5 B, glute max 5/5 B, improvement in lower abdominal mm control.     ASSESSMENT/PLAN  Updated problem list and treatment plan: Diagnosis 1:  Hip pain, SI joint pain    Pain -  manual therapy, splint/taping/bracing/orthotics, self management, education and home program  Decreased strength - therapeutic exercise and therapeutic activities  Impaired balance - neuro re-education and therapeutic activities  Decreased proprioception - neuro re-education and therapeutic activities  Impaired muscle performance - neuro re-education  Decreased function - therapeutic activities  STG/LTGs have been met or progress has been made towards goals:  Yes (See Goal flow sheet completed today.)  Assessment of Progress: The patient's condition is improving.  Self Management Plans:  Patient has been instructed in a home treatment program.  I have re-evaluated this patient and find that the nature, scope, duration and intensity of the therapy is appropriate for the medical condition of the patient.  Quiana continues to require the following intervention to meet STG and LTG's:  PT    Recommendations:  This patient would benefit from continued therapy.     Frequency:  1 X a month, once daily  Duration:  for 1 months        Please refer to the daily flowsheet for treatment today, total treatment time and time spent performing 1:1 timed codes.

## 2022-08-18 ENCOUNTER — OFFICE VISIT (OUTPATIENT)
Dept: DERMATOLOGY | Facility: CLINIC | Age: 37
End: 2022-08-18
Payer: COMMERCIAL

## 2022-08-18 DIAGNOSIS — D22.9 MULTIPLE BENIGN NEVI: ICD-10-CM

## 2022-08-18 DIAGNOSIS — Z80.8 FAMILY HISTORY OF MELANOMA: Primary | ICD-10-CM

## 2022-08-18 DIAGNOSIS — L70.0 ACNE VULGARIS: ICD-10-CM

## 2022-08-18 DIAGNOSIS — D18.01 CHERRY ANGIOMA: ICD-10-CM

## 2022-08-18 DIAGNOSIS — L82.1 SEBORRHEIC KERATOSIS: ICD-10-CM

## 2022-08-18 DIAGNOSIS — L81.4 LENTIGINES: ICD-10-CM

## 2022-08-18 DIAGNOSIS — L68.9 EXCESSIVE HAIR GROWTH: ICD-10-CM

## 2022-08-18 DIAGNOSIS — L82.1 SEBORRHEIC KERATOSES: ICD-10-CM

## 2022-08-18 PROCEDURE — 99213 OFFICE O/P EST LOW 20 MIN: CPT | Performed by: PHYSICIAN ASSISTANT

## 2022-08-18 RX ORDER — SPIRONOLACTONE 100 MG/1
200 TABLET, FILM COATED ORAL DAILY
Qty: 120 TABLET | Refills: 3 | Status: SHIPPED | OUTPATIENT
Start: 2022-08-18 | End: 2023-05-24

## 2022-08-18 RX ORDER — TRETINOIN 0.5 MG/G
CREAM TOPICAL AT BEDTIME
Qty: 20 G | Refills: 1 | Status: SHIPPED | OUTPATIENT
Start: 2022-08-18 | End: 2023-05-24

## 2022-08-18 RX ORDER — TRETINOIN 0.25 MG/G
CREAM TOPICAL
Qty: 20 G | Refills: 1 | Status: SHIPPED | OUTPATIENT
Start: 2022-08-18 | End: 2023-05-24

## 2022-08-18 RX ORDER — DAPSONE 75 MG/G
GEL TOPICAL DAILY
Qty: 90 G | Refills: 1 | Status: SHIPPED | OUTPATIENT
Start: 2022-08-18 | End: 2023-05-24

## 2022-08-18 ASSESSMENT — PAIN SCALES - GENERAL: PAINLEVEL: NO PAIN (0)

## 2022-08-18 NOTE — PATIENT INSTRUCTIONS
Patient Education     Checking for Skin Cancer  You can find cancer early by checking your skin each month. There are 3 kinds of skin cancer. They are melanoma, basal cell carcinoma, and squamous cell carcinoma. Doing monthly skin checks is the best way to find new marks or skin changes. Follow the instructions below for checking your skin.   The ABCDEs of checking moles for melanoma   Check your moles or growths for signs of melanoma using ABCDE:   Asymmetry: the sides of the mole or growth don t match  Border: the edges are ragged, notched, or blurred  Color: the color within the mole or growth varies  Diameter: the mole or growth is larger than 6 mm (size of a pencil eraser)  Evolving: the size, shape, or color of the mole or growth is changing (evolving is not shown in the images below)    Checking for other types of skin cancer  Basal cell carcinoma or squamous cell carcinoma have symptoms such as:     A spot or mole that looks different from all other marks on your skin  Changes in how an area feels, such as itching, tenderness, or pain  Changes in the skin's surface, such as oozing, bleeding, or scaliness  A sore that does not heal  New swelling or redness beyond the border of a mole    Who s at risk?  Anyone can get skin cancer. But you are at greater risk if you have:   Fair skin, light-colored hair, or light-colored eyes  Many moles or abnormal moles on your skin  A history of sunburns from sunlight or tanning beds  A family history of skin cancer  A history of exposure to radiation or chemicals  A weakened immune system  If you have had skin cancer in the past, you are at risk for recurring skin cancer.   How to check your skin  Do your monthly skin checkups in front of a full-length mirror. Check all parts of your body, including your:   Head (ears, face, neck, and scalp)  Torso (front, back, and sides)  Arms (tops, undersides, upper, and lower armpits)  Hands (palms, backs, and fingers, including  under the nails)  Buttocks and genitals  Legs (front, back, and sides)  Feet (tops, soles, toes, including under the nails, and between toes)  If you have a lot of moles, take digital photos of them each month. Make sure to take photos both up close and from a distance. These can help you see if any moles change over time.   Most skin changes are not cancer. But if you see any changes in your skin, call your doctor right away. Only he or she can diagnose a problem. If you have skin cancer, seeing your doctor can be the first step toward getting the treatment that could save your life.   [employer] last reviewed this educational content on 4/1/2019 2000-2020 [employer]. [address] [place] [address], [place] [address]. All rights reserved. This information is not intended as a substitute for professional medical care. Always follow your healthcare professional's instructions.       When should I call my doctor?  If you are worsening or not improving, please, contact us or seek urgent care as noted below.     Who should I call with questions (adults)?  [facility] (adult and pediatric): [phone]  [facility] (adult): [phone]  For urgent needs outside of business hours call the [facility] at [phone] and ask for the dermatology resident on call to be paged  If this is a medical emergency and you are unable to reach an ER, Call [phone]    Who should I call with questions (pediatric)?  [facility]- Pediatric Dermatology  Dr. Tova Morse, Dr. Brooke Quigley, Dr. Clarisa Clinton, YAN Tiwari, Dr. Delaney Guerrero, Dr. Nika Mayfield & Dr. Nii Everett  Non-urgent nurse triage line; [phone]- Marly and Elise LU Care Coordinatorbandar Lopez (/Complex ) [phone]    If you need a prescription refill, please contact your pharmacy. Refills are approved or denied by our  Physicians during normal business hours, Monday through Fridays  Per office policy, refills will not be granted if you have not been seen within the past year (or sooner depending on your child's condition)    Scheduling Information:  Pediatric Appointment Scheduling and Call Center [phone]  Radiology Scheduling- [phone]  Sedation Unit Scheduling- [phone]  [place] Scheduling- General [phone]; Pediatric Dermatology [phone]  Main  Services: [phone]  Icelandic: [phone]  Stateless: [phone]  Hmong/Citizen of [place]/Tajik: [phone]  Preadmission Nursing Department Fax Number: [phone] (Fax all pre-operative paperwork to this number)    For urgent matters arising during evenings, weekends, or holidays that cannot wait for normal business hours please call [phone] and ask for the dermatology resident on call to be paged.

## 2022-08-18 NOTE — NURSING NOTE
Dermatology Rooming Note    Quiana Shepherd's goals for this visit include:   Chief Complaint   Patient presents with     Skin Check     Quiana is here for an annual skin check.  No spots of concern.      RECHECK     Quiana is also here for a medication follow up.  Spirolactone.  Everything is going fine.  Urinating a lot in the morning.      Anita Weaver, CMA

## 2022-08-18 NOTE — PROGRESS NOTES
[facility] Dermatology Note  Encounter Date: Aug 18, 2022  Office Visit     Dermatology Problem List:  1. Acne vulgaris with hormonal component 2/2 hirsutism   - Current tx: tretinoin 0.05% cream at bedtime alternating with tretinoin 0.025% cream, spironolactone 200 mg daily. Aczone 7.5% gel  - Previous tx: spironolactone 100 mg, spironolactone 150 mg daily  2. Seborrheic keratosis   3. FHx melanoma - (mother)  4. Hx dysplastic nevi  - R superior breast - mild atypia - excised with bx  - R medial thigh - mild to moderate atypia - excised with bx  ___________________________________________    Assessment & Plan:    # Hormonal acne 2/2 hirsutism, recent flare  Discussed addition of Aczone topical antibiotic to treatment regimen  - Start Aczone 7.5% gel daily; mix with moisturizer to apply if desired.  - Continue spironolactone 200 mg daily; refilled today.  - Continue alternating tretinoin 0.05% and 0.025% creams in the winter/summer; refilled today.  - Continue current contraceptive - IUD (Mirena).  - Discussed possibility of hormone testing in the future. Patient's mother went through menopause at age 40, patient unsure is she is perimenopausal.    # Benign lesions  # Family history of melanoma  Multiple benign nevi, solar lentigos, seborrheic keratoses, cherry angiomas. Explained to patient benign nature of lesion. No treatment is necessary at this time unless the lesion changes or becomes symptomatic.   - ABCDs of melanoma were discussed and self skin checks were advised.  - Sun precaution was advised including the use of sun screens of SPF 30 or higher, sun protective clothing, and avoidance of tanning beds.  -Continue annual skin exams    Procedures Performed:   None    Follow-up: 1 year(s) in-person, or earlier for new or changing lesions    Staff and Scribe:     Scribe Disclosure:  Patrick CONWAY, am serving as a scribe to document services personally performed by Delfina Rowley PA-C based  on data collection and the provider's statements to me.     Provider Disclosure:   The documentation recorded by the scribe accurately reflects the services I personally performed and the decisions made by me.    All risks, benefits and alternatives were discussed with patient.  Patient is in agreement and understands the assessment and plan.  All questions were answered.    Delfina Rowley PA-C, MPAS  [facility]: Phone: [phone], Fax: [phone]  [facility]: Phone: [phone],  Fax: [phone]  [facility]: Phone: [phone], Fax: [phone]  ___________________________________    CC: Skin Check (Quiana is here for an annual skin check.  No spots of concern. ) and RECHECK (Quiana is also here for a medication follow up.  Spirolactone.  Everything is going fine.  Urinating a lot in the morning. )    HPI:  Ms. Quiana Shepherd is a(n) 37 year old female who presents today as a return patient for a FBSE and medication follow-up. Last seen by in dermatology by me on 4/26/21, at which time patient was continued on spironolactone 200 mg daily and tretinoin 0.05% alternating with tretinoin 0.025% for treatment of hormonal acne and hirsutism. Hx also of DNs in the past. Fhx melanoma, but she has not had skin cancer.    Today, patient notes that her facial acne has worsened this summer. She also notes that she paused her spironolactone for one week due to a bike trip in [place], but restarted immediately after due to new spots appearing that have not yet fully healed. She currently has a Mirena IUD and thus is not menstruating.    Patient is otherwise feeling well, without additional skin concerns.    Labs Reviewed:  N/A    Physical Exam:  Vitals: There were no vitals taken for this visit.  SKIN: Total skin excluding the undergarment areas was performed. The exam included the head/face, neck, both arms,  chest, back, abdomen, both legs, digits and/or nails.   - Bailey I  - Pink macule from previous acne lesion on the left cheek  - There are dome shaped bright red papules on the trunk and extremities.   - Multiple regular brown pigmented macules and papules are identified on the trunk and extremities (< 100).  - Scattered brown macules on sun exposed areas.  - There are waxy stuck on tan to brown papules on the trunk and extremities.   - No other lesions of concern on areas examined.     Medications:  Current Outpatient Medications   Medication     dapsone (ACZONE) 7.5 % gel     levonorgestrel (MIRENA) 20 MCG/24HR IUD     spironolactone (ALDACTONE) 100 MG tablet     tretinoin (RETIN-A) 0.025 % external cream     tretinoin (RETIN-A) 0.05 % external cream     No current facility-administered medications for this visit.      Past Medical History:   Patient Active Problem List   Diagnosis     Family history of melanoma     Acne vulgaris     Past Medical History:   Diagnosis Date     Acne vulgaris 10/26/2017

## 2022-08-18 NOTE — LETTER
8/18/2022       RE: Quiana Shepherd  [address] [facility] [address]     Dear Colleague,    Thank you for referring your patient, Quiana Shepherd, to the [facility]MATOLOGY [facility] [place] at [facility]. Please see a copy of my visit note below.    [facility] Dermatology Note  Encounter Date: Aug 18, 2022  Office Visit     Dermatology Problem List:  1. Acne vulgaris with hormonal component 2/2 hirsutism   - Current tx: tretinoin 0.05% cream at bedtime alternating with tretinoin 0.025% cream, spironolactone 200 mg daily. Aczone 7.5% gel  - Previous tx: spironolactone 100 mg, spironolactone 150 mg daily  2. Seborrheic keratosis   3. FHx melanoma - (mother)  4. Hx dysplastic nevi  - R superior breast - mild atypia - excised with bx  - R medial thigh - mild to moderate atypia - excised with bx  ___________________________________________    Assessment & Plan:    # Hormonal acne 2/2 hirsutism, recent flare  Discussed addition of Aczone topical antibiotic to treatment regimen  - Start Aczone 7.5% gel daily; mix with moisturizer to apply if desired.  - Continue spironolactone 200 mg daily; refilled today.  - Continue alternating tretinoin 0.05% and 0.025% creams in the winter/summer; refilled today.  - Continue current contraceptive - IUD (Mirena).  - Discussed possibility of hormone testing in the future. Patient's mother went through menopause at age 40, patient unsure is she is perimenopausal.    # Benign lesions  # Family history of melanoma  Multiple benign nevi, solar lentigos, seborrheic keratoses, cherry angiomas. Explained to patient benign nature of lesion. No treatment is necessary at this time unless the lesion changes or becomes symptomatic.   - ABCDs of melanoma were discussed and self skin checks were advised.  - Sun precaution was advised including the use of sun screens of SPF 30 or higher, sun protective clothing, and  avoidance of tanning beds.  -Continue annual skin exams    Procedures Performed:   None    Follow-up: 1 year(s) in-person, or earlier for new or changing lesions    Staff and Scribe:     Scribe Disclosure:  I, Patrick Westbrook, am serving as a scribe to document services personally performed by Delfina Rowley PA-C based on data collection and the provider's statements to me.     Provider Disclosure:   The documentation recorded by the scribe accurately reflects the services I personally performed and the decisions made by me.    All risks, benefits and alternatives were discussed with patient.  Patient is in agreement and understands the assessment and plan.  All questions were answered.    Delfina Rowley PA-C, [facility]  [facility] [place]: Phone: [phone], Fax: [phone]  [facility]: Phone: [phone],  Fax: [phone]  [facility]: Phone: [phone], Fax: [phone]  ___________________________________    CC: Skin Check (Quiana is here for an annual skin check.  No spots of concern. ) and RECHECK (Quiana is also here for a medication follow up.  Spirolactone.  Everything is going fine.  Urinating a lot in the morning. )    HPI:  Ms. Quiana Shepherd is a(n) 37 year old female who presents today as a return patient for a FBSE and medication follow-up. Last seen by in dermatology by me on 4/26/21, at which time patient was continued on spironolactone 200 mg daily and tretinoin 0.05% alternating with tretinoin 0.025% for treatment of hormonal acne and hirsutism. Hx also of DNs in the past. Fhx melanoma, but she has not had skin cancer.    Today, patient notes that her facial acne has worsened this summer. She also notes that she paused her spironolactone for one week due to a bike trip in [place], but restarted immediately after due to new spots appearing that have not yet fully healed. She currently has a  Mirena IUD and thus is not menstruating.    Patient is otherwise feeling well, without additional skin concerns.    Labs Reviewed:  N/A    Physical Exam:  Vitals: There were no vitals taken for this visit.  SKIN: Total skin excluding the undergarment areas was performed. The exam included the head/face, neck, both arms, chest, back, abdomen, both legs, digits and/or nails.   - Bailey I  - Pink macule from previous acne lesion on the left cheek  - There are dome shaped bright red papules on the trunk and extremities.   - Multiple regular brown pigmented macules and papules are identified on the trunk and extremities (< 100).  - Scattered brown macules on sun exposed areas.  - There are waxy stuck on tan to brown papules on the trunk and extremities.   - No other lesions of concern on areas examined.     Medications:  Current Outpatient Medications   Medication     dapsone (ACZONE) 7.5 % gel     levonorgestrel (MIRENA) 20 MCG/24HR IUD     spironolactone (ALDACTONE) 100 MG tablet     tretinoin (RETIN-A) 0.025 % external cream     tretinoin (RETIN-A) 0.05 % external cream     No current facility-administered medications for this visit.      Past Medical History:   Patient Active Problem List   Diagnosis     Family history of melanoma     Acne vulgaris     Past Medical History:   Diagnosis Date     Acne vulgaris 10/26/2017

## 2022-11-21 ENCOUNTER — HEALTH MAINTENANCE LETTER (OUTPATIENT)
Age: 37
End: 2022-11-21

## 2022-12-16 NOTE — TELEPHONE ENCOUNTER
DIAGNOSIS: I've been having pain and swelling in the base of my thumb metacarpal area for 6 months.   APPOINTMENT DATE: 12.23.22-left   NOTES STATUS DETAILS   MEDICATION LIST Internal

## 2022-12-20 DIAGNOSIS — M79.645 CHRONIC PAIN OF LEFT THUMB: Primary | ICD-10-CM

## 2022-12-20 DIAGNOSIS — G89.29 CHRONIC PAIN OF LEFT THUMB: Primary | ICD-10-CM

## 2022-12-23 ENCOUNTER — THERAPY VISIT (OUTPATIENT)
Dept: OCCUPATIONAL THERAPY | Facility: CLINIC | Age: 37
End: 2022-12-23
Payer: COMMERCIAL

## 2022-12-23 ENCOUNTER — LAB (OUTPATIENT)
Dept: LAB | Facility: CLINIC | Age: 37
End: 2022-12-23
Payer: COMMERCIAL

## 2022-12-23 ENCOUNTER — PRE VISIT (OUTPATIENT)
Dept: ORTHOPEDICS | Facility: CLINIC | Age: 37
End: 2022-12-23

## 2022-12-23 ENCOUNTER — ANCILLARY PROCEDURE (OUTPATIENT)
Dept: GENERAL RADIOLOGY | Facility: CLINIC | Age: 37
End: 2022-12-23
Attending: FAMILY MEDICINE
Payer: COMMERCIAL

## 2022-12-23 ENCOUNTER — OFFICE VISIT (OUTPATIENT)
Dept: ORTHOPEDICS | Facility: CLINIC | Age: 37
End: 2022-12-23
Payer: COMMERCIAL

## 2022-12-23 DIAGNOSIS — G89.29 CHRONIC PAIN OF LEFT THUMB: ICD-10-CM

## 2022-12-23 DIAGNOSIS — M79.645 CHRONIC PAIN OF LEFT THUMB: Primary | ICD-10-CM

## 2022-12-23 DIAGNOSIS — M79.645 THUMB PAIN, LEFT: Primary | ICD-10-CM

## 2022-12-23 DIAGNOSIS — M79.645 CHRONIC PAIN OF LEFT THUMB: ICD-10-CM

## 2022-12-23 DIAGNOSIS — R25.1 TREMOR OF LEFT HAND: ICD-10-CM

## 2022-12-23 DIAGNOSIS — G89.29 CHRONIC PAIN OF LEFT THUMB: Primary | ICD-10-CM

## 2022-12-23 LAB
CRP SERPL-MCNC: <3 MG/L
DEPRECATED CALCIDIOL+CALCIFEROL SERPL-MC: 18 UG/L (ref 20–75)
ERYTHROCYTE [DISTWIDTH] IN BLOOD BY AUTOMATED COUNT: 12.1 % (ref 10–15)
ERYTHROCYTE [SEDIMENTATION RATE] IN BLOOD BY WESTERGREN METHOD: 8 MM/HR (ref 0–20)
HCT VFR BLD AUTO: 41.6 % (ref 35–47)
HGB BLD-MCNC: 13.7 G/DL (ref 11.7–15.7)
MCH RBC QN AUTO: 31.6 PG (ref 26.5–33)
MCHC RBC AUTO-ENTMCNC: 32.9 G/DL (ref 31.5–36.5)
MCV RBC AUTO: 96 FL (ref 78–100)
PLATELET # BLD AUTO: 259 10E3/UL (ref 150–450)
RBC # BLD AUTO: 4.34 10E6/UL (ref 3.8–5.2)
TSH SERPL DL<=0.005 MIU/L-ACNC: 0.87 UIU/ML (ref 0.3–4.2)
VIT B12 SERPL-MCNC: 329 PG/ML (ref 232–1245)
WBC # BLD AUTO: 5 10E3/UL (ref 4–11)

## 2022-12-23 PROCEDURE — 82607 VITAMIN B-12: CPT | Mod: 90 | Performed by: PATHOLOGY

## 2022-12-23 PROCEDURE — 99213 OFFICE O/P EST LOW 20 MIN: CPT | Performed by: FAMILY MEDICINE

## 2022-12-23 PROCEDURE — 97165 OT EVAL LOW COMPLEX 30 MIN: CPT | Mod: GO | Performed by: OCCUPATIONAL THERAPIST

## 2022-12-23 PROCEDURE — 82306 VITAMIN D 25 HYDROXY: CPT | Mod: 90 | Performed by: PATHOLOGY

## 2022-12-23 PROCEDURE — 36415 COLL VENOUS BLD VENIPUNCTURE: CPT | Performed by: PATHOLOGY

## 2022-12-23 PROCEDURE — 84443 ASSAY THYROID STIM HORMONE: CPT | Performed by: PATHOLOGY

## 2022-12-23 PROCEDURE — 73140 X-RAY EXAM OF FINGER(S): CPT | Mod: LT | Performed by: RADIOLOGY

## 2022-12-23 PROCEDURE — 99000 SPECIMEN HANDLING OFFICE-LAB: CPT | Performed by: PATHOLOGY

## 2022-12-23 PROCEDURE — 85027 COMPLETE CBC AUTOMATED: CPT | Performed by: PATHOLOGY

## 2022-12-23 PROCEDURE — 85652 RBC SED RATE AUTOMATED: CPT | Performed by: PATHOLOGY

## 2022-12-23 PROCEDURE — 97760 ORTHOTIC MGMT&TRAING 1ST ENC: CPT | Mod: GO | Performed by: OCCUPATIONAL THERAPIST

## 2022-12-23 PROCEDURE — 86140 C-REACTIVE PROTEIN: CPT | Performed by: PATHOLOGY

## 2022-12-23 NOTE — PROGRESS NOTES
Subjective:   Quiana Shepherd is a RHD 37 year old female who present with left thumb pain and swelling. States thumb is tender and hot as some points. It has gotten much worse over the last month  Tiny bit of shaking, has recently noticed.  No tremors in the family, nothing Neurologic.  Swelling in thenar eminence  Taking anti-inflammatories, taking 3 ibuprofen every 6-8 hours, helps decrease swelling  NSAIDs taken, dull ache at rest 0.5-1/10  Using the left thumb 2-3/10, sometimes painful  Working at home, teaching online classes, typing isn't the problem  Gripping, twisting, lids, texting, playing video games with a controller, shoveling  No falls, no trauma, cycling with resting position maybe  Changes her  and avoids same position for too long  2013- hip surgery, banned from climbing  Weight lifting, cycling, dancing- forms of [place]  2-3 weight lifting, dance 1, cycling 2-3 per week  1 hour for weight lifting, cycling 30-60 min, dances 30 min  Weakness into hand  Tremor noticed- can over ride it if paying attention  Mom- hand surgery for carpal tunnel and bone spur    Background:   Date of injury: NA    Duration of symptoms: 6 months  Mechanism of Injury: Insidious Onset; Unknown   Intensity: 1/10 at rest, 4/10 with activity   Aggravating factors: Gripping, Gripping and twisting, Shoveling, fine motor skills, opposition  Relieving Factors: Ibuprofen 3 tablets   Prior Evaluation: None    PAST MEDICAL, SOCIAL, SURGICAL AND FAMILY HISTORY: She  has a past medical history of Acne vulgaris (10/26/2017).    She has no past medical history of Arthritis, Basal cell carcinoma, Blood clotting disorder (H), Cerebral infarction (H), Diabetes (H), GI disease, Hay fever, Heart murmur, History of blood transfusion, Hypertension, Joint replaced, Liver disease, Malignant melanoma (H), Pacemaker, Rheumatism, Squamous cell carcinoma, Thyroid disease, Transplant, or Uncomplicated asthma.  She  has no past surgical history on  file.  Her family history includes Cancer in her mother and another family member; Melanoma in her mother and another family member.  She reports that she has never smoked. She has never used smokeless tobacco.    ALLERGIES: She has No Known Allergies.    CURRENT MEDICATIONS: She has a current medication list which includes the following prescription(s): dapsone, levonorgestrel, spironolactone, tretinoin, and tretinoin.     REVIEW OF SYSTEMS: 10 point review of systems is negative except as noted above.     Exam:   There were no vitals taken for this visit.           CONSTITUTIONAL: healthy, alert, mild distress and cooperative  HEAD: Normocephalic. No masses, lesions, tenderness or abnormalities  SKIN: no suspicious lesions or rashes  GAIT: normal  NEUROLOGIC: Non-focal, Normal muscle tone and strength, reflexes normal, sensation grossly normal.  PSYCHIATRIC: affect normal/bright and mentation appears normal.    MUSCULOSKELETAL: left thumb pain  Inspection:Thumb:  slight swelling  Tender: Thumb:   CMC, thenar eminence swelling  Non-tender: Thumb:   proximal phalanx, distal phalanx, MCP joint, PIP joint, DIP joint, no thenar eminence wasting, no triggering  Range of Motion Thumb:   opposition   decreased, flexion MCP   decreased, extension MCP   decreased, flexion IP   decreased, extension IP   decreased  Strength: full strength  Special tests: -Phalen's, -Tinel's              Assessment/Plan:   Pt iIs a 37-year-old female with past medical history of acne presenting with chronic left thumb pain    1. Chronic left thumb pain-  Painful with twisting and opening lids  Hand therapy  Ice    2. Left hand tremor-  Labs obtained    RTC 4 weeks  X-RAY INTERPRETATION:   X-Ray of the Left Hand/Fingers: 3-view, thumb  ordered and interpreted in the office today was negative for fracture, subluxation or joint space abnormality.

## 2022-12-23 NOTE — LETTER
12/23/2022      RE: Quiana Shepherd  [address] [facility] [address]     Dear Colleague,    Thank you for referring your patient, Quiana Shepherd, to the [facility] [place]. Please see a copy of my visit note below.     Subjective:   Quiana Shepherd is a RHD 37 year old female who present with left thumb pain and swelling. States thumb is tender and hot as some points. It has gotten much worse over the last month  Tiny bit of shaking, has recently noticed.  No tremors in the family, nothing Neurologic.  Swelling in thenar eminence  Taking anti-inflammatories, taking 3 ibuprofen every 6-8 hours, helps decrease swelling  NSAIDs taken, dull ache at rest 0.5-1/10  Using the left thumb 2-3/10, sometimes painful  Working at home, teaching online classes, typing isn't the problem  Gripping, twisting, lids, texting, playing video games with a controller, shoveling  No falls, no trauma, cycling with resting position maybe  Changes her  and avoids same position for too long  2013- hip surgery, banned from climbing  Weight lifting, cycling, dancing- forms of PA  2-3 weight lifting, dance 1, cycling 2-3 per week  1 hour for weight lifting, cycling 30-60 min, dances 30 min  Weakness into hand  Tremor noticed- can over ride it if paying attention  Mom- hand surgery for carpal tunnel and bone spur    Background:   Date of injury: NA    Duration of symptoms: 6 months  Mechanism of Injury: Insidious Onset; Unknown   Intensity: 1/10 at rest, 4/10 with activity   Aggravating factors: Gripping, Gripping and twisting, Shoveling, fine motor skills, opposition  Relieving Factors: Ibuprofen 3 tablets   Prior Evaluation: None    PAST MEDICAL, SOCIAL, SURGICAL AND FAMILY HISTORY: She  has a past medical history of Acne vulgaris (10/26/2017).    She has no past medical history of Arthritis, Basal cell carcinoma, Blood clotting disorder (H), Cerebral infarction (H), Diabetes (H), GI disease, Hay  fever, Heart murmur, History of blood transfusion, Hypertension, Joint replaced, Liver disease, Malignant melanoma (H), Pacemaker, Rheumatism, Squamous cell carcinoma, Thyroid disease, Transplant, or Uncomplicated asthma.  She  has no past surgical history on file.  Her family history includes Cancer in her mother and another family member; Melanoma in her mother and another family member.  She reports that she has never smoked. She has never used smokeless tobacco.    ALLERGIES: She has No Known Allergies.    CURRENT MEDICATIONS: She has a current medication list which includes the following prescription(s): dapsone, levonorgestrel, spironolactone, tretinoin, and tretinoin.     REVIEW OF SYSTEMS: 10 point review of systems is negative except as noted above.     Exam:   There were no vitals taken for this visit.           CONSTITUTIONAL: healthy, alert, mild distress and cooperative  HEAD: Normocephalic. No masses, lesions, tenderness or abnormalities  SKIN: no suspicious lesions or rashes  GAIT: normal  NEUROLOGIC: Non-focal, Normal muscle tone and strength, reflexes normal, sensation grossly normal.  PSYCHIATRIC: affect normal/bright and mentation appears normal.    MUSCULOSKELETAL: left thumb pain  Inspection:Thumb:  slight swelling  Tender: Thumb:   CMC, thenar eminence swelling  Non-tender: Thumb:   proximal phalanx, distal phalanx, MCP joint, PIP joint, DIP joint, no thenar eminence wasting, no triggering  Range of Motion Thumb:   opposition   decreased, flexion MCP   decreased, extension MCP   decreased, flexion IP   decreased, extension IP   decreased  Strength: full strength  Special tests: -Phalen's, -Tinel's              Assessment/Plan:   Pt iIs a 37-year-old female with past medical history of acne presenting with chronic left thumb pain    1. Chronic left thumb pain-  Painful with twisting and opening lids  Hand therapy  Ice    2. Left hand tremor-  Labs obtained    RTC 4 weeks  X-RAY INTERPRETATION:    X-Ray of the Left Hand/Fingers: 3-view, thumb  ordered and interpreted in the office today was negative for fracture, subluxation or joint space abnormality.      Sincerely,    Angely Hussein MD

## 2022-12-23 NOTE — PROGRESS NOTES
Hand Therapy Initial Evaluation     Current Date:  12/23/2022     Diagnosis:  left thumb CMC pain  Orders:12/23/2022  Onset: 6 months ago     Precautions: pt does have some generalized hypermobility      Subjective:  Quiana Shepherd is a 37 year old  right  hand dominant female.     Patient reports symptoms of  pain weakness/loss of strength of the L thumb due to insidious onset. Since onset symptoms are not improving Special tests:  x-ray.  Previous treatment: NSAIDS   General health as reported by patient is good.  Pertinent medical history includes:R radial head Fx healed well.  Medical allergies:none.  Surgical history: orthopedic: TIFFANIE hip surgery, 2011.  Medication history: IUD and acne medicine.    Occupational Profile Information:  Current occupation is Instructor at [employer]  Currently working in normal job without restrictions  Job Tasks: Computer Work, Prolonged Sitting  Prior functional level:  no limitations  Barriers include:none  Mobility: No difficulty  Transportation: drives and bicycles  Leisure activities/hobbies: biking , cooking, baking, dancing - used to, fitness classes at the [employer]    Objective:  Observation: Color/Temperature:     [x]?    Normal    []?    Abnormal     Pain Level (Scale 0-10):   12/23/2022   At Rest 0-1   With Use 4     Pain Description:  Date 12/23/2022   Location L thumb CMCj volar mainly and radiating into the thenars, also dorsal   Pain Quality Aching, Dull - but it depends, can be Sharp, Shooting   Frequency intermittent , not always but pretty often   Pain is worst  daytime   Exacerbated by  Gripping, twisting, lids, texting, playing video games with a controller, shoveling   Relieved by NSAIDs and rest   Progression Unchanged      Edema:    12/23/2022 mild of the L thenar eminence      Sensation:  WNL throughout all nerve distributions; per patient report    ROM:   B wrists , elbows, shoulders are WFL, however pt has generalized joint  laxity    ROM  Pain Report: - none  + mild    ++ moderate    +++ severe   Thumb 2022   AROM  (PROM) R L   MP /80 /60   IP /46 /45   RABD 36 42   PABD 35 34+   Retropulsion     Kapandji Opposition Scale (0-10/10) 10+ Can do 10 but sore especially w/ touching the RF pad        STRENGTH:   (Measured in pounds)   2022     Trials Right Left Left   1  Av 70  deferred      Strength:   (Measured in pounds)     3 Pt.  Pinch 2018     Trials Right Left   1 6 13      Lat Pinch 2018     Trials Right Left   1 7 12        Assessment:  Patient presents with symptoms consistent with condition as noted above.    Patient's limitations or Problem List includes: pain, weakness, edema of the left thumb which interferes with the patient's ability to perform ADLs and instrumental activities of daily living as compared to previous level of function.    Rehab Potential:  good, expect return to normal activities.    Patient will benefit from skilled Occupational Therapy to increase ROM and decrease pain, to return to previous activity level and resume normal daily tasks and to reach their rehab potential.    Barriers to Learning:  no barriers    Communication Issues:  Patient appears to be able to clearly communicate and understand verbal and written communication and follow directions correctly.    Chart Review: Brief history including review of medical and/or therapy records relating to the presenting problem and Simple history review with patient    Identified Performance Deficits: work , recreation , self cares and home establishment and management.  Assessment of Occupational Performance:  3-5 Performance Deficits    Clinical Decision Making (Complexity): Low complexity    Treatment Explanation:  The following has been discussed with the patient:  RX ordered/plan of care  Anticipated outcomes  Possible risks and side effects    Plan:  Frequency:  2 X a month, once daily  Duration:   for 3 months    Treatment Plan:   Modalities:  US and Paraffin  Therapeutic Exercise:  AROM and Stabilization  Neuromuscular re-education:  Proprioceptive Training and Kinesiotaping  Manual Techniques:  Myofascial release and Manual edema mobilization  Orthotic Fabrication:  Static and Hand based  Self Care:  Self Care Tasks, Ergonomic Considerations and Work Tasks    Discharge Plan:  Achieve all LTG.  Independent in home treatment program.  Reach maximal therapeutic benefit.    Home Program: See flowsheet  1st DI AROM  HBTSO    Next visit/ Plan:   Pain and edema mgmt. Check orthosis  Check exercises/thumb stability    Thank you for the opportunity to work with this patient.   If you have questions or concerns, please contact me with an in basket message or via the information below.     Angely Chan MS, OTR/L, CHT  Certified Hand Therapist    [facility] - Hand Therapy  Clinics and Surgery Center  [address] [place] [address], Room [address] [place] [address]    Email: [email or web address]   Phone / Voicemail: [phone]   Hand Therapy  phone: [phone] (staffed M-F)  Fax: [phone]

## 2022-12-25 PROBLEM — M79.645 THUMB PAIN, LEFT: Status: ACTIVE | Noted: 2022-12-25

## 2023-04-16 ENCOUNTER — HEALTH MAINTENANCE LETTER (OUTPATIENT)
Age: 38
End: 2023-04-16

## 2023-05-24 DIAGNOSIS — L68.9 EXCESSIVE HAIR GROWTH: ICD-10-CM

## 2023-05-24 DIAGNOSIS — L70.0 ACNE VULGARIS: ICD-10-CM

## 2023-05-24 RX ORDER — TRETINOIN 0.25 MG/G
CREAM TOPICAL
Qty: 20 G | Refills: 1 | Status: SHIPPED | OUTPATIENT
Start: 2023-05-24 | End: 2023-11-17

## 2023-05-24 RX ORDER — DAPSONE 75 MG/G
GEL TOPICAL DAILY
Qty: 90 G | Refills: 1 | Status: SHIPPED | OUTPATIENT
Start: 2023-05-24 | End: 2023-11-17

## 2023-05-24 RX ORDER — TRETINOIN 0.5 MG/G
CREAM TOPICAL AT BEDTIME
Qty: 20 G | Refills: 1 | Status: SHIPPED | OUTPATIENT
Start: 2023-05-24 | End: 2023-11-17

## 2023-05-24 RX ORDER — SPIRONOLACTONE 100 MG/1
200 TABLET, FILM COATED ORAL DAILY
Qty: 120 TABLET | Refills: 3 | Status: SHIPPED | OUTPATIENT
Start: 2023-05-24 | End: 2023-11-17

## 2023-05-31 ENCOUNTER — ANCILLARY PROCEDURE (OUTPATIENT)
Dept: GENERAL RADIOLOGY | Facility: CLINIC | Age: 38
End: 2023-05-31
Attending: FAMILY MEDICINE
Payer: COMMERCIAL

## 2023-05-31 ENCOUNTER — OFFICE VISIT (OUTPATIENT)
Dept: ORTHOPEDICS | Facility: CLINIC | Age: 38
End: 2023-05-31
Payer: COMMERCIAL

## 2023-05-31 DIAGNOSIS — S83.241A TEAR OF MEDIAL MENISCUS OF RIGHT KNEE, CURRENT, UNSPECIFIED TEAR TYPE, INITIAL ENCOUNTER: ICD-10-CM

## 2023-05-31 DIAGNOSIS — M25.561 RIGHT KNEE PAIN, UNSPECIFIED CHRONICITY: ICD-10-CM

## 2023-05-31 DIAGNOSIS — M25.561 RIGHT KNEE PAIN, UNSPECIFIED CHRONICITY: Primary | ICD-10-CM

## 2023-05-31 DIAGNOSIS — M25.461 KNEE EFFUSION, RIGHT: ICD-10-CM

## 2023-05-31 DIAGNOSIS — M25.561 MECHANICAL KNEE PAIN, RIGHT: Primary | ICD-10-CM

## 2023-05-31 PROCEDURE — 73562 X-RAY EXAM OF KNEE 3: CPT | Mod: RT | Performed by: RADIOLOGY

## 2023-05-31 PROCEDURE — 99213 OFFICE O/P EST LOW 20 MIN: CPT | Performed by: FAMILY MEDICINE

## 2023-05-31 NOTE — PROGRESS NOTES
Sports Medicine Clinic Visit    PCP: Service, [facility]    Quiana Shepherd is a 37 year old female who is seen  as self referral presenting with right medial knee pain x 8 days, recurrent right knee pain in past 18 months    Injury: Patient reports standing at a concert for 3 hours, she notices tightness and medial pain afterwards.  Sharp medial knee pain with weightbearing and with extremes of flexion of the knee.  Instability sensation with catching but no lidya locking.  She denies a recent knee injury.  She does indicate in the past 18 months she has recurrently had episodes where she has to favor her right knee.  She felt that she is done so because of a history of left-sided hip  degenerative change associated with the previous left hip arthroscopy and labral repair.  She has been able to tolerate cycling over the last 18 months without right-sided knee pain, but hiking and long distance walking has not been well-tolerated.  Aleve has lessened the pain.    Location of Pain: right knee, medal   Duration of Pain: 8 day(s)  Rating of Pain: 4/10  Pain is better with: REST   Pain is worse with: Sleeping on her side, stairs, squatting, and kneeling  Additional Features: Swelling; tightness   Treatment so far consists of: Ice, Aleve, Rest and Elevation  Prior History of related problems: None     There were no vitals taken for this visit.        Weight lifting, cycling, dancing for physical activity    H/o left hip arthroscopy, TIFFANIE correction, labral repair    Normal CRP and ESR 12/23/2022      PMH:  Past Medical History:   Diagnosis Date     Acne vulgaris 10/26/2017       Active problem list:  Patient Active Problem List   Diagnosis     Family history of melanoma     Acne vulgaris     Thumb pain, left       FH:  Family History   Problem Relation Age of Onset     Cancer Mother         melanoma     Melanoma Mother      Cancer Other         Maternal aunt has a hx of melanoma     Melanoma Other         maternal aunt      Skin Cancer No family hx of        SH:  Social History     Socioeconomic History     Marital status: Single     Spouse name: Not on file     Number of children: Not on file     Years of education: Not on file     Highest education level: Not on file   Occupational History     Not on file   Tobacco Use     Smoking status: Never     Smokeless tobacco: Never   Vaping Use     Vaping status: Not on file   Substance and Sexual Activity     Alcohol use: Not on file     Drug use: Not on file     Sexual activity: Not on file   Other Topics Concern     Parent/sibling w/ CABG, MI or angioplasty before 65F 55M? Not Asked   Social History Narrative     Not on file     Social Determinants of Health     Financial Resource Strain: Not on file   Food Insecurity: Not on file   Transportation Needs: Not on file   Physical Activity: Not on file   Stress: Not on file   Social Connections: Not on file   Intimate Partner Violence: Not on file   Housing Stability: Not on file       MEDS:  See EMR, reviewed  ALL:  See EMR, reviewed    REVIEW OF SYSTEMS:  CONSTITUTIONAL:NEGATIVE for fever, chills, change in weight  INTEGUMENTARY/SKIN: NEGATIVE for worrisome rashes, moles or lesions  EYES: NEGATIVE for vision changes or irritation  ENT/MOUTH: NEGATIVE for ear, mouth and throat problems  RESP:NEGATIVE for significant cough or SOB  BREAST: NEGATIVE for masses, tenderness or discharge  CV: NEGATIVE for chest pain, palpitations or peripheral edema  GI: NEGATIVE for nausea, abdominal pain, heartburn, or change in bowel habits  :NEGATIVE for frequency, dysuria, or hematuria  :NEGATIVE for frequency, dysuria, or hematuria  NEURO: NEGATIVE for weakness, dizziness or paresthesias  ENDOCRINE: NEGATIVE for temperature intolerance, skin/hair changes  HEME/ALLERGY/IMMUNE: NEGATIVE for bleeding problems  PSYCHIATRIC: NEGATIVE for changes in mood or affect      Objective: She has a trace effusion at the right knee today.  Is not warm to the touch.   I can flex and extend the knee fully.  She is consistently tender over the posterior medial joint line.  Nontender over the lateral joint line.  Nontender over the MCL or LCL.  LCL and MCL stresses are negative.  Anterior and posterior drawer is negative.  Patellar apprehension signs are negative.  Nontender over the medial or lateral patellar facet.  Nontender over the patellar tendon, pes anserine bursa or distal IT band.  Appropriate conversation affect.    I personally reviewed the patient x-rays of the right knee standing that show mild tibiofemoral medial joint space narrowing.  No significant patellofemoral DJD.    Assessment: Recurrent right-sided medial in past 18 months, worse in the past knee pain, mechanical symptoms, recurrent swelling 10 days, rule out medial meniscal tear.    Plan: MRI of the right knee is pending to r/o surgical medial meniscus tear.  Phone call follow-up after the MRI.  [place]  She plans on minimizing her hiking during that visit.  She plans on continuing Aleve as needed for pain.     Detail Level: Detailed

## 2023-05-31 NOTE — LETTER
5/31/2023      RE: Quiana Shepherd  [address] [facility] [address]     Dear Colleague,    Thank you for referring your patient, Quiana Shepherd, to the [facility] [place]. Please see a copy of my visit note below.    Sports Medicine Clinic Visit    PCP: Flores, [facility]    Quiana Shepherd is a 37 year old female who is seen  as self referral presenting with right medial knee pain x 8 days, recurrent right knee pain in past 18 months    Injury: Patient reports standing at a concert for 3 hours, she notices tightness and medial pain afterwards.  Sharp medial knee pain with weightbearing and with extremes of flexion of the knee.  Instability sensation with catching but no lidya locking.  She denies a recent knee injury.  She does indicate in the past 18 months she has recurrently had episodes where she has to favor her right knee.  She felt that she is done so because of a history of left-sided hip  degenerative change associated with the previous left hip arthroscopy and labral repair.  She has been able to tolerate cycling over the last 18 months without right-sided knee pain, but hiking and long distance walking has not been well-tolerated.  Aleve has lessened the pain.    Location of Pain: right knee, medal   Duration of Pain: 8 day(s)  Rating of Pain: 4/10  Pain is better with: REST   Pain is worse with: Sleeping on her side, stairs, squatting, and kneeling  Additional Features: Swelling; tightness   Treatment so far consists of: Ice, Aleve, Rest and Elevation  Prior History of related problems: None     There were no vitals taken for this visit.        Weight lifting, cycling, dancing for physical activity    H/o left hip arthroscopy, TIFFANIE correction, labral repair    Normal CRP and ESR 12/23/2022      PMH:  Past Medical History:   Diagnosis Date     Acne vulgaris 10/26/2017       Active problem list:  Patient Active Problem List   Diagnosis     Family history of  melanoma     Acne vulgaris     Thumb pain, left       FH:  Family History   Problem Relation Age of Onset     Cancer Mother         melanoma     Melanoma Mother      Cancer Other         Maternal aunt has a hx of melanoma     Melanoma Other         maternal aunt     Skin Cancer No family hx of        SH:  Social History     Socioeconomic History     Marital status: Single     Spouse name: Not on file     Number of children: Not on file     Years of education: Not on file     Highest education level: Not on file   Occupational History     Not on file   Tobacco Use     Smoking status: Never     Smokeless tobacco: Never   Vaping Use     Vaping status: Not on file   Substance and Sexual Activity     Alcohol use: Not on file     Drug use: Not on file     Sexual activity: Not on file   Other Topics Concern     Parent/sibling w/ CABG, MI or angioplasty before 65F 55M? Not Asked   Social History Narrative     Not on file     Social Determinants of Health     Financial Resource Strain: Not on file   Food Insecurity: Not on file   Transportation Needs: Not on file   Physical Activity: Not on file   Stress: Not on file   Social Connections: Not on file   Intimate Partner Violence: Not on file   Housing Stability: Not on file       MEDS:  See EMR, reviewed  ALL:  See EMR, reviewed    REVIEW OF SYSTEMS:  CONSTITUTIONAL:NEGATIVE for fever, chills, change in weight  INTEGUMENTARY/SKIN: NEGATIVE for worrisome rashes, moles or lesions  EYES: NEGATIVE for vision changes or irritation  ENT/MOUTH: NEGATIVE for ear, mouth and throat problems  RESP:NEGATIVE for significant cough or SOB  BREAST: NEGATIVE for masses, tenderness or discharge  CV: NEGATIVE for chest pain, palpitations or peripheral edema  GI: NEGATIVE for nausea, abdominal pain, heartburn, or change in bowel habits  :NEGATIVE for frequency, dysuria, or hematuria  :NEGATIVE for frequency, dysuria, or hematuria  NEURO: NEGATIVE for weakness, dizziness or  paresthesias  ENDOCRINE: NEGATIVE for temperature intolerance, skin/hair changes  HEME/ALLERGY/IMMUNE: NEGATIVE for bleeding problems  PSYCHIATRIC: NEGATIVE for changes in mood or affect      Objective: She has a trace effusion at the right knee today.  Is not warm to the touch.  I can flex and extend the knee fully.  She is consistently tender over the posterior medial joint line.  Nontender over the lateral joint line.  Nontender over the MCL or LCL.  LCL and MCL stresses are negative.  Anterior and posterior drawer is negative.  Patellar apprehension signs are negative.  Nontender over the medial or lateral patellar facet.  Nontender over the patellar tendon, pes anserine bursa or distal IT band.  Appropriate conversation affect.    I personally reviewed the patient x-rays of the right knee standing that show mild tibiofemoral medial joint space narrowing.  No significant patellofemoral DJD.    Assessment: Recurrent right-sided medial in past 18 months, worse in the past knee pain, mechanical symptoms, recurrent swelling 10 days, rule out medial meniscal tear.    Plan: MRI of the right knee is pending to r/o surgical medial meniscus tear.  Phone call follow-up after the MRI.  Patient has [place]  She plans on minimizing her hiking during that visit.  She plans on continuing Aleve as needed for pain.        Again, thank you for allowing me to participate in the care of your patient.      Sincerely,    Jr Oliver MD

## 2023-06-05 ENCOUNTER — ANCILLARY PROCEDURE (OUTPATIENT)
Dept: MRI IMAGING | Facility: CLINIC | Age: 38
End: 2023-06-05
Attending: FAMILY MEDICINE
Payer: COMMERCIAL

## 2023-06-05 DIAGNOSIS — M25.461 KNEE EFFUSION, RIGHT: ICD-10-CM

## 2023-06-05 DIAGNOSIS — S83.241A TEAR OF MEDIAL MENISCUS OF RIGHT KNEE, CURRENT, UNSPECIFIED TEAR TYPE, INITIAL ENCOUNTER: ICD-10-CM

## 2023-06-05 DIAGNOSIS — M25.561 MECHANICAL KNEE PAIN, RIGHT: ICD-10-CM

## 2023-06-05 PROCEDURE — 73721 MRI JNT OF LWR EXTRE W/O DYE: CPT | Mod: RT | Performed by: RADIOLOGY

## 2023-06-15 ENCOUNTER — VIRTUAL VISIT (OUTPATIENT)
Dept: ORTHOPEDICS | Facility: CLINIC | Age: 38
End: 2023-06-15
Payer: COMMERCIAL

## 2023-06-15 DIAGNOSIS — M25.561 MECHANICAL KNEE PAIN, RIGHT: Primary | ICD-10-CM

## 2023-06-15 DIAGNOSIS — S83.231D COMPLEX TEAR OF MEDIAL MENISCUS OF RIGHT KNEE AS CURRENT INJURY, SUBSEQUENT ENCOUNTER: ICD-10-CM

## 2023-06-15 PROCEDURE — 99212 OFFICE O/P EST SF 10 MIN: CPT | Mod: 93 | Performed by: FAMILY MEDICINE

## 2023-06-15 NOTE — PROGRESS NOTES
Ritu 15, 2023: Quiana Shepherd is a 37 year old female who is seen in f/u up for right knee MRI results.     Phone f/up MRI right knee results    MRI consistent with a medial meniscal tear with meniscal flap in the medial femoral gutter.  Lateral meniscus intact.  Cruciate ligaments intact.  Patellofemoral chondromalacia changes.  Chondral loss in the medial compartment weightbearing surface.  No subchondral osseous changes.    PT h/o Sharp medial knee pain with weightbearing and with extremes of flexion of the knee.  Instability sensation with catching but no lidya locking.  She denies a recent knee injury.  She does indicate in the past 18 months she has recurrently had episodes where she has to favor her right knee.      Weight lifting, cycling, dancing for physical activity     H/o left hip arthroscopy, TIFFANIE correction, labral repair     Normal CRP and ESR 12/23/2022      Phone start:  11:17  Phone stop:  11:22      S: We discussed MRI results in detail.  Patient would like to discuss arthroscopy options with the surgeon.  Referral to Dr. Herrera placed.      O:  GENERAL: healthy, alert, without distress  RESP: No audible wheeze, cough.  No increased work of breathing.    NEURO:  Mentation and speech appropriate for age.  PSYCH: mentation appears normal, concentration appears appropriate, judgement and insight intact.

## 2023-06-15 NOTE — LETTER
6/15/2023       RE: Quiana Shepherd  [address] [facility] [address]     Dear Colleague,    Thank you for referring your patient, Quiana Shepherd, to the [facility] [place] at [facility]. Please see a copy of my visit note below.    Ritu 15, 2023: Quiana Shepherd is a 37 year old female who is seen in f/u up for right knee MRI results.     Phone f/up MRI right knee results    MRI consistent with a medial meniscal tear with meniscal flap in the medial femoral gutter.  Lateral meniscus intact.  Cruciate ligaments intact.  Patellofemoral chondromalacia changes.  Chondral loss in the medial compartment weightbearing surface.  No subchondral osseous changes.    PT h/o Sharp medial knee pain with weightbearing and with extremes of flexion of the knee.  Instability sensation with catching but no lidya locking.  She denies a recent knee injury.  She does indicate in the past 18 months she has recurrently had episodes where she has to favor her right knee.      Weight lifting, cycling, dancing for physical activity     H/o left hip arthroscopy, TIFFANIE correction, labral repair     Normal CRP and ESR 12/23/2022      Phone start:  11:17  Phone stop:  11:22      S: We discussed MRI results in detail.  Patient would like to discuss arthroscopy options with the surgeon.  Referral to Dr. Herrera placed.      O:  GENERAL: healthy, alert, without distress  RESP: No audible wheeze, cough.  No increased work of breathing.    NEURO:  Mentation and speech appropriate for age.  PSYCH: mentation appears normal, concentration appears appropriate, judgement and insight intact.        Again, thank you for allowing me to participate in the care of your patient.      Sincerely,    Jr Oliver MD

## 2023-06-20 ENCOUNTER — TELEPHONE (OUTPATIENT)
Dept: ORTHOPEDICS | Facility: CLINIC | Age: 38
End: 2023-06-20
Payer: COMMERCIAL

## 2023-06-20 NOTE — TELEPHONE ENCOUNTER
ION spoke with patient and was able to assist scheduling an appointment with Dr. Mando Herrera on 7/21/2023 for her right knee.    Patient had no further questions and was appreciative of the return call.    ION Beltran

## 2023-06-20 NOTE — TELEPHONE ENCOUNTER
Health Call Center    Phone Message    May a detailed message be left on voicemail: yes     Reason for Call: Other: Patient spoke with Dr. Oliver on 6/15. He told patient that he would be placing a referral for an orthopedic specialist. He mentioned a specific provider by name but patient could not recall. Please contact patient back at [phone].     Action Taken: Message routed to:  [facility] [id number]    Travel Screening: Not Applicable

## 2023-07-05 PROBLEM — M79.645 THUMB PAIN, LEFT: Status: RESOLVED | Noted: 2022-12-25 | Resolved: 2023-07-05

## 2023-07-14 ASSESSMENT — ENCOUNTER SYMPTOMS
JOINT SWELLING: 1
STIFFNESS: 1
MUSCLE WEAKNESS: 0
MYALGIAS: 0
ARTHRALGIAS: 1
MUSCLE CRAMPS: 0
NECK PAIN: 0
BACK PAIN: 0

## 2023-07-20 NOTE — TELEPHONE ENCOUNTER
DIAGNOSIS: right knee, referred by Dr. Oliver   APPOINTMENT DATE: 7/21/23   NOTES STATUS DETAILS   OFFICE NOTE from referring provider Internal 6/15/23 OV Jr Oliver MD   MEDICATION LIST Internal    LABS & IMAGING      CBC/DIFF Internal 12/23/22   [facility]  Report: Epic  Images: PACS  MR right knee: 6/5/23  XR right knee: 5/31/23

## 2023-07-21 ENCOUNTER — PRE VISIT (OUTPATIENT)
Dept: ORTHOPEDICS | Facility: CLINIC | Age: 38
End: 2023-07-21

## 2023-07-21 ENCOUNTER — ANCILLARY PROCEDURE (OUTPATIENT)
Dept: GENERAL RADIOLOGY | Facility: CLINIC | Age: 38
End: 2023-07-21
Attending: ORTHOPAEDIC SURGERY
Payer: COMMERCIAL

## 2023-07-21 ENCOUNTER — OFFICE VISIT (OUTPATIENT)
Dept: ORTHOPEDICS | Facility: CLINIC | Age: 38
End: 2023-07-21
Payer: COMMERCIAL

## 2023-07-21 DIAGNOSIS — M25.561 RIGHT KNEE PAIN: ICD-10-CM

## 2023-07-21 DIAGNOSIS — M23.203 OLD TEAR OF MEDIAL MENISCUS OF RIGHT KNEE, UNSPECIFIED TEAR TYPE: Primary | ICD-10-CM

## 2023-07-21 PROCEDURE — 99213 OFFICE O/P EST LOW 20 MIN: CPT | Performed by: ORTHOPAEDIC SURGERY

## 2023-07-21 PROCEDURE — 77073 BONE LENGTH STUDIES: CPT | Performed by: STUDENT IN AN ORGANIZED HEALTH CARE EDUCATION/TRAINING PROGRAM

## 2023-07-21 NOTE — LETTER
"    7/21/2023         RE: Quiana Shepherd  [address] [facility] [address]        Dear Colleague,    Thank you for referring your patient, Quiana Shepherd, to the [facility] [place]. Please see a copy of my visit note below.    CHIEF COMPLAINT: Right knee pain and swelling    HISTORY of PRESENT ILLNESS:   Quiana is a very pleasant 38-year-old female with approximate 3-month history of right knee pain and swelling.  Her symptoms started at a concert at [employer] [address].  No injury but just prolonged standing.  Her pain is medial.  She has some slight clicking but no locking.  Her swelling is activity related.    Of note she has a family history of \"bad knees\".    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  [employer]  --Sport: Cycling [place] hiking    FAMILY HISTORY: (Reviewed with the patient and in the medical record)  -- No family history of bleeding, clotting, or difficulty with anesthesia    REVIEW OF SYSTEMS: (Reviewed with the patient and on the health intake form)  -- A comprehensive 10 point review of systems was conducted and is negative except as noted in the HPI    PHYSICAL EXAMINATION:     General: Awake, Alert and Oriented, No acute Distress. Articulate and Interactive    Sensation intact to touch  Pulses 2+ and capillary refill is brisk  Dorsiflexion and plantar flexion intact    Knee Examination:  Range of motion 7/0/140 which is symmetrical.  The right knee has a trace effusion.  Negative Lachman.  No posterior drawer.  No " opening to varus or valgus stress.  No patellar apprehension.  Tender on the medial joint line.  Nontender on the lateral joint line.  Slight discomfort with the Miguel's maneuver.    IMAGING:    Plain Radiographs: Personally reviewed the patient's radiographs and radiographic report.  No fracture or loose body noted.  Early medial compartment cartilage space narrowing bilaterally    Long-leg alignment films obtained today reveal 3 degrees of bilateral varus alignment.    MRI: I personally reviewed the patient's MRI and MRI results work.  There is complex tearing of the posterior horn of the medial meniscus.  There is chondral wear of the medial femoral condyle.  In addition there is some chondral wear of the trochlea and patella.    IMPRESSION:  Quiana is a 38-year-old female with a difficult knee problem.  She already is developing fairly significant chondral wear for a 38-year-old.  She has a medial meniscus tear.  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  We discussed management.  We discussed nonoperative treatment as well as surgical intervention.  I explained that a reasonable first step may be a debridement of her medial meniscus and assessment of her cartilage surfaces.  She does not feel that she is quite ready for surgical treatment yet and would like to try physical therapy.  Think this is quite reasonable.    PLAN:  We will have her start physical therapy here with [facility].  She will see me back in 8 to 10 weeks.  If her symptoms have not improved we can revisit the idea of arthroscopic intervention.    CC: Jr Oliver MD        Again, thank you for allowing me to participate in the care of your patient.        Sincerely,        Mando Herrera MD

## 2023-07-22 NOTE — PROGRESS NOTES
"CHIEF COMPLAINT: Right knee pain and swelling    HISTORY of PRESENT ILLNESS:   Quiana is a very pleasant 38-year-old female with approximate 3-month history of right knee pain and swelling.  Her symptoms started at a concert at [employer] [address].  No injury but just prolonged standing.  Her pain is medial.  She has some slight clicking but no locking.  Her swelling is activity related.    Of note she has a family history of \"bad knees\".    PAST MEDICAL HISTORY: (Reviewed with the patient and in the [facility] medical record)    PAST SURGICAL HISTORY: (Reviewed with the patient and in the [facility] medical record)    MEDICATIONS: (Reviewed with the patient and in the [facility] medical record)      ALLERGIES: (Reviewed with the patient and in the [facility] medical record)  1. 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  [employer]  --Sport: Cycling [place] hiking    FAMILY HISTORY: (Reviewed with the patient and in the medical record)  -- No family history of bleeding, clotting, or difficulty with anesthesia    REVIEW OF SYSTEMS: (Reviewed with the patient and on the health intake form)  -- A comprehensive 10 point review of systems was conducted and is negative except as noted in the HPI    PHYSICAL EXAMINATION:     General: Awake, Alert and Oriented, No acute Distress. Articulate and Interactive    Sensation intact to touch  Pulses 2+ and capillary refill is brisk  Dorsiflexion and plantar flexion intact    Knee Examination:  Range of motion 7/0/140 which is symmetrical.  The right knee has a trace effusion.  Negative Lachman.  No posterior drawer.  No opening to varus or valgus stress.  No patellar apprehension.  Tender on the medial joint line.  Nontender on the lateral joint line.  Slight discomfort with the Miguel's maneuver.    IMAGING:    Plain Radiographs: Personally reviewed the patient's radiographs " and radiographic report.  No fracture or loose body noted.  Early medial compartment cartilage space narrowing bilaterally    Long-leg alignment films obtained today reveal 3 degrees of bilateral varus alignment.    MRI: I personally reviewed the patient's MRI and MRI results work.  There is complex tearing of the posterior horn of the medial meniscus.  There is chondral wear of the medial femoral condyle.  In addition there is some chondral wear of the trochlea and patella.    IMPRESSION:  Quiana is a 38-year-old female with a difficult knee problem.  She already is developing fairly significant chondral wear for a 38-year-old.  She has a medial meniscus tear.  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  We discussed management.  We discussed nonoperative treatment as well as surgical intervention.  I explained that a reasonable first step may be a debridement of her medial meniscus and assessment of her cartilage surfaces.  She does not feel that she is quite ready for surgical treatment yet and would like to try physical therapy.  Think this is quite reasonable.    PLAN:  1. We will have her start physical therapy here with [facility].  2. She will see me back in 8 to 10 weeks.  If her symptoms have not improved we can revisit the idea of arthroscopic intervention.    CC: Jr Oliver MD

## 2023-08-04 ASSESSMENT — ACTIVITIES OF DAILY LIVING (ADL)
SWELLING: I HAVE THE SYMPTOM BUT IT DOES NOT AFFECT MY ACTIVITY
KNEE_ACTIVITY_OF_DAILY_LIVING_SUM: 58
PAIN: THE SYMPTOM AFFECTS MY ACTIVITY SLIGHTLY
GIVING WAY, BUCKLING OR SHIFTING OF KNEE: I DO NOT HAVE THE SYMPTOM
GO DOWN STAIRS: ACTIVITY IS SOMEWHAT DIFFICULT
HOW_WOULD_YOU_RATE_THE_CURRENT_FUNCTION_OF_YOUR_KNEE_DURING_YOUR_USUAL_DAILY_ACTIVITIES_ON_A_SCALE_FROM_0_TO_100_WITH_100_BEING_YOUR_LEVEL_OF_KNEE_FUNCTION_PRIOR_TO_YOUR_INJURY_AND_0_BEING_THE_INABILITY_TO_PERFORM_ANY_OF_YOUR_USUAL_DAILY_ACTIVITIES?: 60
HOW_WOULD_YOU_RATE_THE_OVERALL_FUNCTION_OF_YOUR_KNEE_DURING_YOUR_USUAL_DAILY_ACTIVITIES?: NEARLY NORMAL
SIT WITH YOUR KNEE BENT: ACTIVITY IS MINIMALLY DIFFICULT
RAW_SCORE: 58
STIFFNESS: I HAVE THE SYMPTOM BUT IT DOES NOT AFFECT MY ACTIVITY
WEAKNESS: I DO NOT HAVE THE SYMPTOM
WALK: ACTIVITY IS MINIMALLY DIFFICULT
KNEEL ON THE FRONT OF YOUR KNEE: ACTIVITY IS MINIMALLY DIFFICULT
GO UP STAIRS: ACTIVITY IS MINIMALLY DIFFICULT
RISE FROM A CHAIR: ACTIVITY IS NOT DIFFICULT
STAND: ACTIVITY IS MINIMALLY DIFFICULT
AS_A_RESULT_OF_YOUR_KNEE_INJURY,_HOW_WOULD_YOU_RATE_YOUR_CURRENT_LEVEL_OF_DAILY_ACTIVITY?: NEARLY NORMAL
LIMPING: I DO NOT HAVE THE SYMPTOM
SQUAT: ACTIVITY IS MINIMALLY DIFFICULT
KNEE_ACTIVITY_OF_DAILY_LIVING_SCORE: 82.86

## 2023-08-11 ENCOUNTER — THERAPY VISIT (OUTPATIENT)
Dept: PHYSICAL THERAPY | Facility: CLINIC | Age: 38
End: 2023-08-11
Attending: ORTHOPAEDIC SURGERY
Payer: COMMERCIAL

## 2023-08-11 DIAGNOSIS — M23.203 OLD TEAR OF MEDIAL MENISCUS OF RIGHT KNEE, UNSPECIFIED TEAR TYPE: ICD-10-CM

## 2023-08-11 PROCEDURE — 97161 PT EVAL LOW COMPLEX 20 MIN: CPT | Mod: GP | Performed by: PHYSICAL THERAPIST

## 2023-08-11 PROCEDURE — 97140 MANUAL THERAPY 1/> REGIONS: CPT | Mod: GP | Performed by: PHYSICAL THERAPIST

## 2023-08-11 PROCEDURE — 97110 THERAPEUTIC EXERCISES: CPT | Mod: GP | Performed by: PHYSICAL THERAPIST

## 2023-08-11 NOTE — PROGRESS NOTES
PHYSICAL THERAPY EVALUATION  Type of Visit: Evaluation    See electronic medical record for Abuse and Falls Screening details.    Subjective       Presenting condition or subjective complaint: Medial meniscus tear  Date of onset: 05/01/23  May 2023 - was standing at a concert at [address] when she felt onset of knee pain and swelling the following day.  Relevant medical history: History of fractures; Neck injury; Osteoarthritis   Dates & types of surgery: Broken neck and clavicle - 1993, R Hip TIFFANIE repair - 2010    Prior diagnostic imaging/testing results: MRI; X-ray     Prior therapy history for the same diagnosis, illness or injury: No      Living Environment  Social support: Alone   Type of home: House   Stairs to enter the home: Yes 3 Is there a railing: No   Ramp: No   Stairs inside the home: Yes 12 Is there a railing: Yes   Help at home: None  Equipment owned: Crutches     Employment: Yes Instructor  Hobbies/Interests: Cycling, dancing, hiking, traveling, cooking    Patient goals for therapy: Go down the stairs easily, stand for long periods of time, dance, lunge    Pain assessment: Pain present     Objective   KNEE EVALUATION  PAIN: Pain Level at Rest: 0/10  Pain Level with Use: 8/10  Pain Location: knee  Pain Quality: Aching  Pain Frequency: intermittent  Pain is Worst: daytime  Pain is Exacerbated By: stairs, standing  Pain is Relieved By: cold  Pain Progression: Improved      Lower Extremity Exam    Dynamic Movement Screen:  2 leg stance:wnl - varum per xray  2 leg squat:Reduced squat depth d/t reported pain at knee    1 leg stance:   Right: normal  Left: normal    1 leg squat:   Right: proprioceptive challenge  Left: proprioceptive challenge    Gait: wnl    Patellofemoral Joint Examination:  Test Right Left   Patellar Orientation:   90 deg flexion neutral neutral   OKC Patellar Tracking Normal Normal   Patellar Orientation:  0 deg flexion neutral neutral   Quadrant Mobility (Med:Lat) 1:1  1:1   Effusion 0  1+   Quad Activation Normal Good     Knee Joint AROM   Right Left Difference   Hyperextension 5 deg 5 deg 0 deg   Extension 0 deg 0 deg 0 deg   Flexion 135 deg 135 deg 0 deg     Hip Joint PROM Screen   Right Left Difference   Flex 100 deg 90 deg 10 deg   ER 60 deg 50 deg 10 deg   IR 30 deg 10 deg 20 deg     Lower Extremity Muscle Strength (x/5)   Right Left   Hip ER 4+/5 4+/5   Hip IR 4+/5 4+/5   Hip ABD 4+/5 4+/5   Hip ADD nt/5 nt/5   Hip Ext 5-/5 5-/5   Knee Flex 5/5 5/5                 Assessment & Plan   CLINICAL IMPRESSIONS  Medical Diagnosis: Medial meniscus tear    Treatment Diagnosis: R knee pain   Impression/Assessment: Patient is a 38 year old female with knee complaints.  The following significant findings have been identified: Pain, Decreased ROM/flexibility, Decreased joint mobility, Decreased strength, Impaired balance, Decreased proprioception, Edema, Impaired muscle performance, and Decreased activity tolerance. These impairments interfere with their ability to perform self care tasks, recreational activities, household mobility, and community mobility as compared to previous level of function.     Clinical Decision Making (Complexity):  Clinical Presentation: Stable/Uncomplicated  Clinical Presentation Rationale: based on medical and personal factors listed in PT evaluation  Clinical Decision Making (Complexity): Low complexity    PLAN OF CARE  Treatment Interventions:  Interventions: Manual Therapy, Neuromuscular Re-education, Therapeutic Activity, Therapeutic Exercise, Self-Care/Home Management    Long Term Goals     PT Goal 1  Goal Description: stand 30 min  Rationale: to maximize safety and independence with performance of ADLs and functional tasks;to maximize safety and independence within the home;to maximize safety and independence within the community;to maximize safety and independence with transportation  Target Date: 10/10/23      Frequency of Treatment: 2x/month  Duration of Treatment: 3  months    Recommended Referrals to Other Professionals: Physical Therapy  Education Assessment:        Risks and benefits of evaluation/treatment have been explained.   Patient/Family/caregiver agrees with Plan of Care.     Evaluation Time:     PT Eval, Low Complexity Minutes (15919): 15     Signing Clinician: Anita Delaney PT

## 2023-08-17 ENCOUNTER — THERAPY VISIT (OUTPATIENT)
Dept: PHYSICAL THERAPY | Facility: CLINIC | Age: 38
End: 2023-08-17
Attending: ORTHOPAEDIC SURGERY
Payer: COMMERCIAL

## 2023-08-17 DIAGNOSIS — M23.203 OLD TEAR OF MEDIAL MENISCUS OF RIGHT KNEE, UNSPECIFIED TEAR TYPE: Primary | ICD-10-CM

## 2023-08-17 PROCEDURE — 97110 THERAPEUTIC EXERCISES: CPT | Mod: GP | Performed by: PHYSICAL THERAPIST

## 2023-08-17 PROCEDURE — 97140 MANUAL THERAPY 1/> REGIONS: CPT | Mod: GP | Performed by: PHYSICAL THERAPIST

## 2023-08-31 ENCOUNTER — THERAPY VISIT (OUTPATIENT)
Dept: PHYSICAL THERAPY | Facility: CLINIC | Age: 38
End: 2023-08-31
Attending: ORTHOPAEDIC SURGERY
Payer: COMMERCIAL

## 2023-08-31 DIAGNOSIS — M23.203 OLD TEAR OF MEDIAL MENISCUS OF RIGHT KNEE, UNSPECIFIED TEAR TYPE: Primary | ICD-10-CM

## 2023-08-31 PROCEDURE — 97530 THERAPEUTIC ACTIVITIES: CPT | Mod: GP | Performed by: PHYSICAL THERAPIST

## 2023-08-31 PROCEDURE — 97110 THERAPEUTIC EXERCISES: CPT | Mod: GP | Performed by: PHYSICAL THERAPIST

## 2023-08-31 PROCEDURE — 97140 MANUAL THERAPY 1/> REGIONS: CPT | Mod: GP | Performed by: PHYSICAL THERAPIST

## 2023-08-31 ASSESSMENT — ACTIVITIES OF DAILY LIVING (ADL)
SWELLING: I HAVE THE SYMPTOM BUT IT DOES NOT AFFECT MY ACTIVITY
STIFFNESS: I HAVE THE SYMPTOM BUT IT DOES NOT AFFECT MY ACTIVITY
STAND: ACTIVITY IS MINIMALLY DIFFICULT
HOW_WOULD_YOU_RATE_THE_OVERALL_FUNCTION_OF_YOUR_KNEE_DURING_YOUR_USUAL_DAILY_ACTIVITIES?: NEARLY NORMAL
AS_A_RESULT_OF_YOUR_KNEE_INJURY,_HOW_WOULD_YOU_RATE_YOUR_CURRENT_LEVEL_OF_DAILY_ACTIVITY?: NEARLY NORMAL
WEAKNESS: I DO NOT HAVE THE SYMPTOM
PAIN: THE SYMPTOM AFFECTS MY ACTIVITY SLIGHTLY
KNEEL ON THE FRONT OF YOUR KNEE: ACTIVITY IS MINIMALLY DIFFICULT
KNEE_ACTIVITY_OF_DAILY_LIVING_SUM: 58
GO UP STAIRS: ACTIVITY IS NOT DIFFICULT
RAW_SCORE: 58
KNEE_ACTIVITY_OF_DAILY_LIVING_SCORE: 82.86
WALK: ACTIVITY IS MINIMALLY DIFFICULT
RISE FROM A CHAIR: ACTIVITY IS MINIMALLY DIFFICULT
GIVING WAY, BUCKLING OR SHIFTING OF KNEE: I DO NOT HAVE THE SYMPTOM
SQUAT: ACTIVITY IS SOMEWHAT DIFFICULT
GO DOWN STAIRS: ACTIVITY IS MINIMALLY DIFFICULT
LIMPING: I DO NOT HAVE THE SYMPTOM
SIT WITH YOUR KNEE BENT: ACTIVITY IS MINIMALLY DIFFICULT
HOW_WOULD_YOU_RATE_THE_CURRENT_FUNCTION_OF_YOUR_KNEE_DURING_YOUR_USUAL_DAILY_ACTIVITIES_ON_A_SCALE_FROM_0_TO_100_WITH_100_BEING_YOUR_LEVEL_OF_KNEE_FUNCTION_PRIOR_TO_YOUR_INJURY_AND_0_BEING_THE_INABILITY_TO_PERFORM_ANY_OF_YOUR_USUAL_DAILY_ACTIVITIES?: 85

## 2023-09-15 ENCOUNTER — THERAPY VISIT (OUTPATIENT)
Dept: PHYSICAL THERAPY | Facility: CLINIC | Age: 38
End: 2023-09-15
Payer: COMMERCIAL

## 2023-09-15 DIAGNOSIS — M23.203 OLD TEAR OF MEDIAL MENISCUS OF RIGHT KNEE, UNSPECIFIED TEAR TYPE: Primary | ICD-10-CM

## 2023-09-15 PROCEDURE — 97110 THERAPEUTIC EXERCISES: CPT | Mod: GP | Performed by: PHYSICAL THERAPIST

## 2023-09-21 ENCOUNTER — OFFICE VISIT (OUTPATIENT)
Dept: ORTHOPEDICS | Facility: CLINIC | Age: 38
End: 2023-09-21
Payer: COMMERCIAL

## 2023-09-21 VITALS — HEIGHT: 67 IN | WEIGHT: 160 LBS | BODY MASS INDEX: 25.11 KG/M2

## 2023-09-21 DIAGNOSIS — M16.32 OSTEOARTHRITIS OF LEFT HIP JOINT DUE TO DYSPLASIA: Primary | ICD-10-CM

## 2023-09-21 PROCEDURE — 99214 OFFICE O/P EST MOD 30 MIN: CPT | Performed by: ORTHOPAEDIC SURGERY

## 2023-09-21 NOTE — PROGRESS NOTES
"Chief Complaint: Consult (Left Hip pain, patient having pain flare up, patient has sciatica that flares up frequently limiting her hip ROM )         HPI: Quiana Shepherd returns today in follow-up for her left hip.  She reports severe groin pain.  This was limiting on a recent trip.  She is taking a daily NSAID.  She takes at least 1 Aleve and has taken up to 4 in a 24-hour period.  Here to discuss treatment options.    Current Status:  LETY Jr: 73.47  [facility]:  Global Mental Health Score - (P) 15  Global Physical Health Score - (P) 15  PROMIS TOTAL - SUBSCORES - (P) 30  Medications and allergies are documented in the EMR and have been reviewed.      Current Outpatient Medications:     dapsone (ACZONE) 7.5 % gel, Apply topically daily, Disp: 90 g, Rfl: 1    levonorgestrel (MIRENA) 20 MCG/24HR IUD, 1 each by Intrauterine route once, Disp: , Rfl:     spironolactone (ALDACTONE) 100 MG tablet, Take 2 tablets (200 mg) by mouth daily, Disp: 120 tablet, Rfl: 3    tretinoin (RETIN-A) 0.025 % external cream, Use every night, Disp: 20 g, Rfl: 1    tretinoin (RETIN-A) 0.05 % external cream, Apply topically At Bedtime, Disp: 20 g, Rfl: 1    Allergies: Patient has no known allergies.        Physical Exam:  On physical examination the patient appears the stated age, is in no acute distress, affect is appropriate, and breathing is non-labored.    Ht 1.702 m (5' 7\")   Wt 72.6 kg (160 lb)   BMI 25.06 kg/m    Body mass index is 25.06 kg/m .    Rises from chair: Easily  Gait: Mild antalgic with less time on the left  ROM: A little irritated  Abduction: 40  Adduction: 20  Flexion:110  IRF: 20  ERF: 35  Anterior impingement is very painful.  Beighton score is 9    Distally, the circulatory, motor, and sensation exam is intact with 5/5 EHL, gastroc-soleus, and tibialis anterior.  Sensation to light touch is intact.  Dorsalis pedis and posterior tibialis pulses are palpable.  There are no sores on the feet, no bruising, and no " lymphedema.    Assessment: This is a 38-year-old female with advanced hip osteoarthritis (left) associated with moderate to more often severe symptoms.  She is taking daily Aleve and up to 4 Aleve a day with higher levels of activity.  No injections.  We discussed the diagnosis and the treatment options at length. We discussed the risks and benefits of total knee arthroplasty at length and reviewed the proposed surgical plan.  The discussion included but was not limited to the following:  blood clots, blood clots to the lungs, infection, injury to blood vessels and nerves, stiffness, and continued pain. We discussed that as part of the routine part of surgical care a qualified assist may finish closing the wound after I have left the room.  We discussed that given her young age I would expect her to outlive the service life of this implant and require future hip surgeries.  Discussed a higher dislocation risk in the hypermobile patient population and detailed posterior hip precautions and that these would likely apply to her for life.  We discussed the post-operative recovery for the typical patient, return to driving, and return to normal activities.  All the patient's questions were answered to the best of my ability.      Plan: Steroid injection left hip.  Appropriate for total hip arthroplasty if this does not improve her function.  Happy to see her back to discuss further  No ref. provider found

## 2023-09-21 NOTE — NURSING NOTE
"Reason For Visit:   Chief Complaint   Patient presents with    Consult     Left Hip pain, patient having pain flare up, patient has sciatica that flares up frequently limiting her hip ROM        Ht 1.702 m (5' 7\")   Wt 72.6 kg (160 lb)   BMI 25.06 kg/m      Pain Assessment  Patient Currently in Pain: Yes  0-10 Pain Scale:  (No injury but just prolonged standing. Her pain is medial. She has some slight clicking but no locking. Her swelling is activity related.)    Matthew Lopez, EMT      "

## 2023-09-21 NOTE — LETTER
"    9/21/2023         RE: Quiana Shepherd  [address] [facility] [address]        Dear Colleague,    Thank you for referring your patient, Quiana Shepherd, to the [facility] [place]. Please see a copy of my visit note below.    Chief Complaint: Consult (Left Hip pain, patient having pain flare up, patient has sciatica that flares up frequently limiting her hip ROM )         HPI: Quiana Shepherd returns today in follow-up for her left hip.  She reports severe groin pain.  This was limiting on a recent trip.  She is taking a daily NSAID.  She takes at least 1 Aleve and has taken up to 4 in a 24-hour period.  Here to discuss treatment options.    Current Status:  LETY Jr: 73.47  [facility]:  Global Mental Health Score - (P) 15  Global Physical Health Score - (P) 15  PROMIS TOTAL - SUBSCORES - (P) 30  Medications and allergies are documented in the EMR and have been reviewed.      Current Outpatient Medications:     dapsone (ACZONE) 7.5 % gel, Apply topically daily, Disp: 90 g, Rfl: 1    levonorgestrel (MIRENA) 20 MCG/24HR IUD, 1 each by Intrauterine route once, Disp: , Rfl:     spironolactone (ALDACTONE) 100 MG tablet, Take 2 tablets (200 mg) by mouth daily, Disp: 120 tablet, Rfl: 3    tretinoin (RETIN-A) 0.025 % external cream, Use every night, Disp: 20 g, Rfl: 1    tretinoin (RETIN-A) 0.05 % external cream, Apply topically At Bedtime, Disp: 20 g, Rfl: 1    Allergies: Patient has no known allergies.        Physical Exam:  On physical examination the patient appears the stated age, is in no acute distress, affect is appropriate, and breathing is non-labored.    Ht 1.702 m (5' 7\")   Wt 72.6 kg (160 lb)   BMI 25.06 kg/m    Body mass index is 25.06 kg/m .    Rises from chair: Easily  Gait: Mild antalgic with less time on the left  ROM: A little irritated  Abduction: 40  Adduction: 20  Flexion:110  IRF: 20  ERF: 35  Anterior impingement is very painful.  Beighton score is 9    Distally, the " circulatory, motor, and sensation exam is intact with 5/5 EHL, gastroc-soleus, and tibialis anterior.  Sensation to light touch is intact.  Dorsalis pedis and posterior tibialis pulses are palpable.  There are no sores on the feet, no bruising, and no lymphedema.    Assessment: This is a 38-year-old female with advanced hip osteoarthritis (left) associated with moderate to more often severe symptoms.  She is taking daily Aleve and up to 4 Aleve a day with higher levels of activity.  No injections.  We discussed the diagnosis and the treatment options at length. We discussed the risks and benefits of total knee arthroplasty at length and reviewed the proposed surgical plan.  The discussion included but was not limited to the following:  blood clots, blood clots to the lungs, infection, injury to blood vessels and nerves, stiffness, and continued pain. We discussed that as part of the routine part of surgical care a qualified assist may finish closing the wound after I have left the room.  We discussed that given her young age I would expect her to outlive the service life of this implant and require future hip surgeries.  Discussed a higher dislocation risk in the hypermobile patient population and detailed posterior hip precautions and that these would likely apply to her for life.  We discussed the post-operative recovery for the typical patient, return to driving, and return to normal activities.  All the patient's questions were answered to the best of my ability.      Plan: Steroid injection left hip.  Appropriate for total hip arthroplasty if this does not improve her function.  Happy to see her back to discuss further  No ref. provider found    Jr Mohamud MD

## 2023-09-22 ENCOUNTER — OFFICE VISIT (OUTPATIENT)
Dept: ORTHOPEDICS | Facility: CLINIC | Age: 38
End: 2023-09-22
Payer: COMMERCIAL

## 2023-09-22 VITALS — WEIGHT: 160 LBS | HEIGHT: 67 IN | BODY MASS INDEX: 25.11 KG/M2

## 2023-09-22 DIAGNOSIS — M23.203 OLD TEAR OF MEDIAL MENISCUS OF RIGHT KNEE, UNSPECIFIED TEAR TYPE: Primary | ICD-10-CM

## 2023-09-22 PROCEDURE — 99213 OFFICE O/P EST LOW 20 MIN: CPT | Performed by: ORTHOPAEDIC SURGERY

## 2023-09-22 NOTE — PROGRESS NOTES
Chief Complaint: Follow-up right knee pain    History of Present Illness:  Quiana is a 38-year-old female with right knee pain secondary to chondral wear and medial meniscus tear.  We started her in physical therapy.  This has helped.  Her issue now is her left hip.  She has spoken with Dr. Jr Mohamud about a left hip arthroplasty.    Physical Examination:  Full and symmetrical range of motion.  No effusion.  Positive medial joint line tenderness.  No lateral joint line tenderness.  Ligaments are stable.    Imaging:  No new imaging    Impression:  38-year-old female with right medial meniscal tearing and fairly significant chondral wear on her medial femoral condyle.  This is a bit surprising for her age.  Fortunately she has improved a bit with physical therapy.  At this point we do not feel that we need to take any action with her knee.  We will want to continue to follow it if her symptoms worsen.    Plan:  She will continue a strengthening program.  She will follow-up with me on a as needed basis.  20 minutes was spent on the date of the encounter doing chart review, patient visit, and documentation

## 2023-09-22 NOTE — LETTER
9/22/2023         RE: Quiana Shepherd  [address] [facility] [address]        Dear Colleague,    Thank you for referring your patient, Quiana Shepherd, to the [facility] [place]. Please see a copy of my visit note below.    Chief Complaint: Follow-up right knee pain    History of Present Illness:  Quiana is a 38-year-old female with right knee pain secondary to chondral wear and medial meniscus tear.  We started her in physical therapy.  This has helped.  Her issue now is her left hip.  She has spoken with Dr. Jr Mohamud about a left hip arthroplasty.    Physical Examination:  Full and symmetrical range of motion.  No effusion.  Positive medial joint line tenderness.  No lateral joint line tenderness.  Ligaments are stable.    Imaging:  No new imaging    Impression:  38-year-old female with right medial meniscal tearing and fairly significant chondral wear on her medial femoral condyle.  This is a bit surprising for her age.  Fortunately she has improved a bit with physical therapy.  At this point we do not feel that we need to take any action with her knee.  We will want to continue to follow it if her symptoms worsen.    Plan:  She will continue a strengthening program.  She will follow-up with me on a as needed basis.  20 minutes was spent on the date of the encounter doing chart review, patient visit, and documentation      Mando Herrera MD

## 2023-09-25 ENCOUNTER — THERAPY VISIT (OUTPATIENT)
Dept: PHYSICAL THERAPY | Facility: CLINIC | Age: 38
End: 2023-09-25
Payer: COMMERCIAL

## 2023-09-25 DIAGNOSIS — M23.203 OLD TEAR OF MEDIAL MENISCUS OF RIGHT KNEE, UNSPECIFIED TEAR TYPE: Primary | ICD-10-CM

## 2023-09-25 PROCEDURE — 97530 THERAPEUTIC ACTIVITIES: CPT | Mod: GP | Performed by: PHYSICAL THERAPIST

## 2023-09-25 PROCEDURE — 97110 THERAPEUTIC EXERCISES: CPT | Mod: GP | Performed by: PHYSICAL THERAPIST

## 2023-09-25 ASSESSMENT — ACTIVITIES OF DAILY LIVING (ADL)
GO UP STAIRS: ACTIVITY IS NOT DIFFICULT
RISE FROM A CHAIR: ACTIVITY IS NOT DIFFICULT
STAND: ACTIVITY IS NOT DIFFICULT
WEAKNESS: I HAVE THE SYMPTOM BUT IT DOES NOT AFFECT MY ACTIVITY
GIVING WAY, BUCKLING OR SHIFTING OF KNEE: I DO NOT HAVE THE SYMPTOM
KNEE_ACTIVITY_OF_DAILY_LIVING_SCORE: 92.86
SQUAT: ACTIVITY IS SOMEWHAT DIFFICULT
SWELLING: I DO NOT HAVE THE SYMPTOM
KNEE_ACTIVITY_OF_DAILY_LIVING_SUM: 65
RAW_SCORE: 65
WALK: ACTIVITY IS NOT DIFFICULT
STIFFNESS: I DO NOT HAVE THE SYMPTOM
SIT WITH YOUR KNEE BENT: ACTIVITY IS NOT DIFFICULT
PAIN: I HAVE THE SYMPTOM BUT IT DOES NOT AFFECT MY ACTIVITY
HOW_WOULD_YOU_RATE_THE_CURRENT_FUNCTION_OF_YOUR_KNEE_DURING_YOUR_USUAL_DAILY_ACTIVITIES_ON_A_SCALE_FROM_0_TO_100_WITH_100_BEING_YOUR_LEVEL_OF_KNEE_FUNCTION_PRIOR_TO_YOUR_INJURY_AND_0_BEING_THE_INABILITY_TO_PERFORM_ANY_OF_YOUR_USUAL_DAILY_ACTIVITIES?: 95
KNEEL ON THE FRONT OF YOUR KNEE: ACTIVITY IS NOT DIFFICULT
GO DOWN STAIRS: ACTIVITY IS MINIMALLY DIFFICULT
LIMPING: I DO NOT HAVE THE SYMPTOM
HOW_WOULD_YOU_RATE_THE_OVERALL_FUNCTION_OF_YOUR_KNEE_DURING_YOUR_USUAL_DAILY_ACTIVITIES?: NORMAL
AS_A_RESULT_OF_YOUR_KNEE_INJURY,_HOW_WOULD_YOU_RATE_YOUR_CURRENT_LEVEL_OF_DAILY_ACTIVITY?: NEARLY NORMAL

## 2023-09-25 NOTE — PROGRESS NOTES
Therapist Impression:   Due to good strength numbers, focus on HEP.  Review of full HEP.  Consider isometrics and/or pulses if full ROM exercises are bothersome.    NEXT: NA      Subjective:  Knee injury May.  Severe hip OA lead to sciatica symptoms.      Objective:  LOWER EXTREMITY STRENGTH TESTING  HHD Knee Extension at 70 deg   Left   Trial 1: 82  Trial 2: 84  Pain: none   Right Trial 1: 79  Trial 2: 87  Pain: none     Pain with hip flexion and IR L  Negative knee effusion R.

## 2023-10-06 ENCOUNTER — TELEPHONE (OUTPATIENT)
Dept: ORTHOPEDICS | Facility: CLINIC | Age: 38
End: 2023-10-06

## 2023-10-10 ENCOUNTER — OFFICE VISIT (OUTPATIENT)
Dept: ORTHOPEDICS | Facility: CLINIC | Age: 38
End: 2023-10-10
Payer: COMMERCIAL

## 2023-10-10 VITALS — WEIGHT: 160 LBS | BODY MASS INDEX: 25.11 KG/M2 | HEIGHT: 67 IN

## 2023-10-10 DIAGNOSIS — M16.12 ARTHRITIS OF LEFT HIP: Primary | ICD-10-CM

## 2023-10-10 PROCEDURE — 99207 PR DROP WITH A PROCEDURE: CPT | Performed by: PREVENTIVE MEDICINE

## 2023-10-10 PROCEDURE — 20611 DRAIN/INJ JOINT/BURSA W/US: CPT | Mod: LT | Performed by: PREVENTIVE MEDICINE

## 2023-10-10 RX ADMIN — LIDOCAINE HYDROCHLORIDE 4 ML: 10 INJECTION, SOLUTION EPIDURAL; INFILTRATION; INTRACAUDAL; PERINEURAL at 13:51

## 2023-10-10 RX ADMIN — TRIAMCINOLONE ACETONIDE 40 MG: 40 INJECTION, SUSPENSION INTRA-ARTICULAR; INTRAMUSCULAR at 13:51

## 2023-10-10 NOTE — NURSING NOTE
[address] [address]  Dept: [phone]  ______________________________________________________________________________    Patient: Quiana Shepherd   : 1985   MRN: [id number]   October 10, 2023    INVASIVE PROCEDURE SAFETY CHECKLIST    Date: October 10, 2023   Procedure: left intra-articular hip joint injection with kenalog and USG  Patient Name: Quiana Shepherd  MRN: [id number]  YOB: 1985    Action: Complete sections as appropriate. Any discrepancy results in a HARD COPY until resolved.     PRE PROCEDURE:  Patient ID verified with 2 identifiers (name and  or MRN): Yes  Procedure and site verified with patient/designee (when able): Yes  Accurate consent documentation in medical record: Yes  H&P (or appropriate assessment) documented in medical record: Yes  H&P must be up to 20 days prior to procedure and updates within 24 hours of procedure as applicable: NA  Relevant diagnostic and radiology test results appropriately labeled and displayed as applicable: NA  Procedure site(s) marked with provider initials: NA    TIMEOUT:  Time-Out performed immediately prior to starting procedure, including verbal and active participation of all team members addressing the following:Yes  * Correct patient identify  * Confirmed that the correct side and site are marked  * An accurate procedure consent form  * Agreement on the procedure to be done  * Correct patient position  * Relevant images and results are properly labeled and appropriately displayed  * The need to administer antibiotics or fluids for irrigation purposes during the procedure as applicable   * Safety precautions based on patient history or medication use    DURING PROCEDURE: Verification of correct person, site, and procedures any time the responsibility for care of the patient is transferred to another member of the care team.       Prior to injection, verified patient  identity using patient's name and date of birth.  Due to injection administration, patient instructed to remain in clinic for 15 minutes  afterwards, and to report any adverse reaction to me immediately.    Joint injection was performed.      Lido   Drug Amount Wasted:  Yes: 1 mg/ml   Vial/Syringe: Single dose vial  Expiration Date:  04/01/2027    Kenalog  Drug Amount Wasted:  None  Vial/Syringe: Single dose vial  Expiration Date: 04/01/2025    Ruby Hawley, ATC  October 10, 2023

## 2023-10-10 NOTE — PROGRESS NOTES
Quiana presents at the request of Dr Mohamud for a left hip injection  Diagnosis: left hip arthritis  Intraarticular Hip Injection - Ultrasound Guided  The patient was informed of the risks and the benefits of the procedure and a written consent was signed.  The patient s left  hip was prepped with chlorhexidine in sterile fashion.   40 mg of triamcinolone suspension was drawn up into a 5 mL syringe with 4 mL of 1% lidocaine.  Injection was performed using sterile technique.  Under ultrasound guidance a 3.5-inch 22-gauge needle was used to enter the femoracetabular joint.  Anterior approach was used, needle placement was visualized and documented with ultrasound.  Ultrasound visualization was necessary due to decreased joint space in the setting of osteoarthritis.  Injection performed long axis to the probe.  Injection solution visualized within the joint space.  Images were permanently stored for the patient's record.  There were no complications. The patient tolerated the procedure well. There was negligible bleeding.          Large Joint InjectioN: L hip joint    Date/Time: 10/10/2023 1:51 PM    Performed by: Nii Gomez MD  Authorized by: Nii Gomez MD    Indications:  Pain and osteoarthritis  Needle Size:  22 G  Guidance: ultrasound    Approach:  Anterior  Location:  Hip      Site:  L hip joint  Medications:  40 mg triamcinolone 40 MG/ML; 4 mL lidocaine (PF) 1 %  Outcome:  Tolerated well, no immediate complications  Procedure discussed: discussed risks, benefits, and alternatives    Consent Given by:  Patient  Timeout: timeout called immediately prior to procedure    Prep: patient was prepped and draped in usual sterile fashion

## 2023-10-10 NOTE — LETTER
10/10/2023      RE: Quiana Shepherd  [address] [facility] [address]     Dear Colleague,    Thank you for referring your patient, Quiana Shepherd, to the [facility] [place]. Please see a copy of my visit note below.    Quiana presents at the request of Dr Mohamud for a left hip injection  Diagnosis: left hip arthritis  Intraarticular Hip Injection - Ultrasound Guided  The patient was informed of the risks and the benefits of the procedure and a written consent was signed.  The patient s left  hip was prepped with chlorhexidine in sterile fashion.   40 mg of triamcinolone suspension was drawn up into a 5 mL syringe with 4 mL of 1% lidocaine.  Injection was performed using sterile technique.  Under ultrasound guidance a 3.5-inch 22-gauge needle was used to enter the femoracetabular joint.  Anterior approach was used, needle placement was visualized and documented with ultrasound.  Ultrasound visualization was necessary due to decreased joint space in the setting of osteoarthritis.  Injection performed long axis to the probe.  Injection solution visualized within the joint space.  Images were permanently stored for the patient's record.  There were no complications. The patient tolerated the procedure well. There was negligible bleeding.          Large Joint InjectioN: L hip joint    Date/Time: 10/10/2023 1:51 PM    Performed by: Nii Gmoez MD  Authorized by: Nii Goemz MD    Indications:  Pain and osteoarthritis  Needle Size:  22 G  Guidance: ultrasound    Approach:  Anterior  Location:  Hip      Site:  L hip joint  Medications:  40 mg triamcinolone 40 MG/ML; 4 mL lidocaine (PF) 1 %  Outcome:  Tolerated well, no immediate complications  Procedure discussed: discussed risks, benefits, and alternatives    Consent Given by:  Patient  Timeout: timeout called immediately prior to procedure    Prep: patient was prepped and draped in usual sterile fashion           Again, thank you for allowing me to participate in the care of your patient.      Sincerely,    Nii Gomez MD

## 2023-10-20 RX ORDER — TRIAMCINOLONE ACETONIDE 40 MG/ML
40 INJECTION, SUSPENSION INTRA-ARTICULAR; INTRAMUSCULAR
Status: SHIPPED | OUTPATIENT
Start: 2023-10-10

## 2023-10-20 RX ORDER — LIDOCAINE HYDROCHLORIDE 10 MG/ML
4 INJECTION, SOLUTION EPIDURAL; INFILTRATION; INTRACAUDAL; PERINEURAL
Status: SHIPPED | OUTPATIENT
Start: 2023-10-10

## 2023-11-17 ENCOUNTER — OFFICE VISIT (OUTPATIENT)
Dept: DERMATOLOGY | Facility: CLINIC | Age: 38
End: 2023-11-17
Payer: COMMERCIAL

## 2023-11-17 DIAGNOSIS — Z86.018 HISTORY OF DYSPLASTIC NEVUS: ICD-10-CM

## 2023-11-17 DIAGNOSIS — Z80.8 FAMILY HISTORY OF MELANOMA: ICD-10-CM

## 2023-11-17 DIAGNOSIS — L68.9 EXCESSIVE HAIR GROWTH: ICD-10-CM

## 2023-11-17 DIAGNOSIS — D22.9 MULTIPLE BENIGN NEVI: ICD-10-CM

## 2023-11-17 DIAGNOSIS — L70.0 ACNE VULGARIS: Primary | ICD-10-CM

## 2023-11-17 DIAGNOSIS — L81.4 SOLAR LENTIGO: ICD-10-CM

## 2023-11-17 DIAGNOSIS — D18.01 CHERRY ANGIOMA: ICD-10-CM

## 2023-11-17 PROCEDURE — 99214 OFFICE O/P EST MOD 30 MIN: CPT | Performed by: PHYSICIAN ASSISTANT

## 2023-11-17 RX ORDER — TRETINOIN 0.25 MG/G
CREAM TOPICAL
Qty: 20 G | Refills: 1 | Status: SHIPPED | OUTPATIENT
Start: 2023-11-17

## 2023-11-17 RX ORDER — SPIRONOLACTONE 100 MG/1
200 TABLET, FILM COATED ORAL DAILY
Qty: 180 TABLET | Refills: 3 | Status: SHIPPED | OUTPATIENT
Start: 2023-11-17

## 2023-11-17 RX ORDER — TRETINOIN 0.5 MG/G
CREAM TOPICAL AT BEDTIME
Qty: 20 G | Refills: 1 | Status: SHIPPED | OUTPATIENT
Start: 2023-11-17

## 2023-11-17 RX ORDER — DAPSONE 75 MG/G
GEL TOPICAL DAILY
Qty: 90 G | Refills: 1 | Status: SHIPPED | OUTPATIENT
Start: 2023-11-17

## 2023-11-17 ASSESSMENT — PAIN SCALES - GENERAL: PAINLEVEL: NO PAIN (0)

## 2023-11-17 NOTE — PROGRESS NOTES
[facility] Dermatology Note  Encounter Date: Nov 17, 2023  Office Visit      Dermatology Problem List:  Last TBSE 11/17/23, recommend yearly    1. Acne vulgaris with hormonal component 2/2 hirsutism   - Current tx: tretinoin 0.05% cream at bedtime alternating with tretinoin 0.025% cream, spironolactone 200 mg daily. Aczone 7.5% gel  - Previous tx: spironolactone 100 mg, spironolactone 150 mg daily  - Future Consideration: hormone testing (mother went through menopause at 39 y/o)  2. Seborrheic keratosis   3. Hx dysplastic nevi  - R superior breast - mild atypia - excised with bx  - R medial thigh - mild to moderate atypia - excised with bx    FHx: Melanoma (mother).  SHx: Works remotely.  ____________________________________________    Assessment & Plan:    # Hormonal acne 2/2 hirsutism. Acne well-controlled with current regimen. Hirsutism controlled with plucking, but patient is concerned with scaring.  - Continue Aczone 7.5% gel daily. Mix with moisturizer to apply if desired. Refilled today.  - Continue spironolactone 200 mg QD. Refilled today.  - Continue alternating tretinoin 0.05% cream in the winter and summer months, and tretinoin 0.025% creams in the spring and fall months. Refilled both creams today.  - Continue current contraceptive (Mirena IUD).  - For hirsutism, recommended at-home dermaplaning with facial razors which can be purchased at [employer].    # H/O DN.  # FHx of melanoma.  # Benign findings: multiple benign nevi, lentigines, cherry angiomas.  - edu on benign etiology  - Signs and Symptoms of non-melanoma skin cancer and ABCDEs of melanoma reviewed with patient. Patient encouraged to perform monthly self skin exams and educated on how to perform them. UV precautions reviewed with patient. Patient was asked about new or changing moles/lesions on body.   - Sunscreen: Apply 20 minutes prior to going outdoors and reapply every two hours, when wet or sweating. We recommend using an  SPF 30 or higher, and to use one that is water resistant.     - RTC for changes.  - Continue annual skin exams.    Procedures Performed:   N/A    Follow-up: 1 year in-person, or earlier for new or changing lesions    Staff/Scribe:     Scribe Disclosure:   I, Carol Luanne, am serving as a scribe to document services personally performed by Delfina Rowley PA-C based on data collection and the provider's statements to me.     All risks, benefits and alternatives were discussed with patient.  Patient is in agreement and understands the assessment and plan.  All questions were answered.    Delfina Rowley PA-C, [facility]  [facility]: Phone: [phone], Fax: [phone]  [facility]: Phone: [phone],  Fax: [phone]  [facility]: Phone: [phone], Fax: [phone]  ____________________________________________    CC: Skin Check (Here today for a skin check. Ance follow up. )      HPI:  Ms. Quiana Shepherd is a 38 year old female who presents today as a return patient for a skin check and acne.    Patient reports acne has improved. Still taking spironolactone 200 mg QAM, along with using tretinoin regularly and AcZone. Had a mole that became crusty around her left shoulder where her bra strap rubs, but it resolved. Still gets facial hair on her chin and she has to pluck daily. She believes there is some scarring from plucking in that area. She recently returned from a trip to [facility].    Patient is otherwise feeling well, without additional concerns.    Labs:  N/A    Physical Exam:  Vitals: There were no vitals taken for this visit.  SKIN: Total skin excluding the undergarment areas was performed. The exam included the head/face, neck, both arms, chest, back, abdomen, both legs, digits and/or nails.    - Bailey's skin type I, <100 nevi  - There are dome shaped bright red papules on the trunk.   -  Multiple regular brown pigmented macules and papules are identified on the trunk and extremities.   - Scattered brown macules on sun exposed areas.    - no active acneiform papules or comedones on the face  - No other lesions of concern on areas examined.     Medications:  Current Outpatient Medications   Medication    dapsone (ACZONE) 7.5 % gel    levonorgestrel (MIRENA) 20 MCG/24HR IUD    spironolactone (ALDACTONE) 100 MG tablet    tretinoin (RETIN-A) 0.025 % external cream    tretinoin (RETIN-A) 0.05 % external cream     Current Facility-Administered Medications   Medication    lidocaine (PF) (XYLOCAINE) 1 % injection 4 mL    triamcinolone (KENALOG-40) injection 40 mg      Past Medical/Surgical History:   Patient Active Problem List   Diagnosis    Family history of melanoma    Acne vulgaris    Old tear of medial meniscus of right knee, unspecified tear type     Past Medical History:   Diagnosis Date    Acne vulgaris 10/26/2017

## 2023-11-17 NOTE — LETTER
11/17/2023       RE: Quiana Shepherd  [address] [facility] [address]     Dear Colleague,    Thank you for referring your patient, Quiana Shepherd, to the [facility]ERMAT[facility] [place] [facility]. Please see a copy of my visit note below.    [facility] Dermatology Note  Encounter Date: Nov 17, 2023  Office Visit      Dermatology Problem List:  Last TBSE 11/17/23, recommend yearly    1. Acne vulgaris with hormonal component 2/2 hirsutism   - Current tx: tretinoin 0.05% cream at bedtime alternating with tretinoin 0.025% cream, spironolactone 200 mg daily. Aczone 7.5% gel  - Previous tx: spironolactone 100 mg, spironolactone 150 mg daily  - Future Consideration: hormone testing (mother went through menopause at 41 y/o)  2. Seborrheic keratosis   3. Hx dysplastic nevi  - R superior breast - mild atypia - excised with bx  - R medial thigh - mild to moderate atypia - excised with bx    FHx: Melanoma (mother).  SHx: Works remotely.  ____________________________________________    Assessment & Plan:    # Hormonal acne 2/2 hirsutism. Acne well-controlled with current regimen. Hirsutism controlled with plucking, but patient is concerned with scaring.  - Continue Aczone 7.5% gel daily. Mix with moisturizer to apply if desired. Refilled today.  - Continue spironolactone 200 mg QD. Refilled today.  - Continue alternating tretinoin 0.05% cream in the winter and summer months, and tretinoin 0.025% creams in the spring and fall months. Refilled both creams today.  - Continue current contraceptive (Mirena IUD).  - For hirsutism, recommended at-home dermaplaning with facial razors which can be purchased at [employer].    # H/O DN.  # FHx of melanoma.  # Benign findings: multiple benign nevi, lentigines, cherry angiomas.  - edu on benign etiology  - Signs and Symptoms of non-melanoma skin cancer and ABCDEs of melanoma reviewed with patient.  Patient encouraged to perform monthly self skin exams and educated on how to perform them. UV precautions reviewed with patient. Patient was asked about new or changing moles/lesions on body.   - Sunscreen: Apply 20 minutes prior to going outdoors and reapply every two hours, when wet or sweating. We recommend using an SPF 30 or higher, and to use one that is water resistant.     - RTC for changes.  - Continue annual skin exams.    Procedures Performed:   N/A    Follow-up: 1 year in-person, or earlier for new or changing lesions    Staff/Scribe:     Scribe Disclosure:   I, Carol Stroud, am serving as a scribe to document services personally performed by Delfina Rowley PA-C based on data collection and the provider's statements to me.     All risks, benefits and alternatives were discussed with patient.  Patient is in agreement and understands the assessment and plan.  All questions were answered.    Delfina Rowley PA-C, [facility]  [facility]: Phone: [phone], Fax: [phone]  [facility]: Phone: [phone],  Fax: [phone]  [facility]: Phone: [phone], Fax: [phone]  ____________________________________________    CC: Skin Check (Here today for a skin check. Ance follow up. )      HPI:  Ms. Quiana Shepherd is a 38 year old female who presents today as a return patient for a skin check and acne.    Patient reports acne has improved. Still taking spironolactone 200 mg QAM, along with using tretinoin regularly and AcZone. Had a mole that became crusty around her left shoulder where her bra strap rubs, but it resolved. Still gets facial hair on her chin and she has to pluck daily. She believes there is some scarring from plucking in that area. She recently returned from a trip to [facility].    Patient is otherwise feeling well, without additional concerns.    Labs:  N/A    Physical Exam:  Vitals:  There were no vitals taken for this visit.  SKIN: Total skin excluding the undergarment areas was performed. The exam included the head/face, neck, both arms, chest, back, abdomen, both legs, digits and/or nails.    - Bailey's skin type I, <100 nevi  - There are dome shaped bright red papules on the trunk.   - Multiple regular brown pigmented macules and papules are identified on the trunk and extremities.   - Scattered brown macules on sun exposed areas.    - no active acneiform papules or comedones on the face  - No other lesions of concern on areas examined.     Medications:  Current Outpatient Medications   Medication    dapsone (ACZONE) 7.5 % gel    levonorgestrel (MIRENA) 20 MCG/24HR IUD    spironolactone (ALDACTONE) 100 MG tablet    tretinoin (RETIN-A) 0.025 % external cream    tretinoin (RETIN-A) 0.05 % external cream     Current Facility-Administered Medications   Medication    lidocaine (PF) (XYLOCAINE) 1 % injection 4 mL    triamcinolone (KENALOG-40) injection 40 mg      Past Medical/Surgical History:   Patient Active Problem List   Diagnosis    Family history of melanoma    Acne vulgaris    Old tear of medial meniscus of right knee, unspecified tear type     Past Medical History:   Diagnosis Date    Acne vulgaris 10/26/2017

## 2023-11-17 NOTE — PATIENT INSTRUCTIONS

## 2023-11-17 NOTE — NURSING NOTE
Dermatology Rooming Note    Quiana Shepherd's goals for this visit include:   Chief Complaint   Patient presents with    Skin Check     Here today for a skin check. Ance follow up.      Keely Qureshi RN

## 2024-01-05 ENCOUNTER — OFFICE VISIT (OUTPATIENT)
Dept: ORTHOPEDICS | Facility: CLINIC | Age: 39
End: 2024-01-05
Payer: COMMERCIAL

## 2024-01-05 ENCOUNTER — ANCILLARY PROCEDURE (OUTPATIENT)
Dept: GENERAL RADIOLOGY | Facility: CLINIC | Age: 39
End: 2024-01-05
Attending: PREVENTIVE MEDICINE
Payer: COMMERCIAL

## 2024-01-05 VITALS — WEIGHT: 160 LBS | BODY MASS INDEX: 25.11 KG/M2 | HEIGHT: 67 IN

## 2024-01-05 DIAGNOSIS — M54.16 LUMBAR RADICULAR PAIN: ICD-10-CM

## 2024-01-05 DIAGNOSIS — M54.16 LUMBAR RADICULAR PAIN: Primary | ICD-10-CM

## 2024-01-05 DIAGNOSIS — M16.12 ARTHRITIS OF LEFT HIP: ICD-10-CM

## 2024-01-05 PROCEDURE — 20611 DRAIN/INJ JOINT/BURSA W/US: CPT | Mod: LT | Performed by: PREVENTIVE MEDICINE

## 2024-01-05 PROCEDURE — 72100 X-RAY EXAM L-S SPINE 2/3 VWS: CPT | Performed by: STUDENT IN AN ORGANIZED HEALTH CARE EDUCATION/TRAINING PROGRAM

## 2024-01-05 PROCEDURE — 99214 OFFICE O/P EST MOD 30 MIN: CPT | Mod: 25 | Performed by: PREVENTIVE MEDICINE

## 2024-01-05 RX ORDER — METHYLPREDNISOLONE 4 MG
TABLET, DOSE PACK ORAL
Qty: 21 TABLET | Refills: 0 | Status: SHIPPED | OUTPATIENT
Start: 2024-01-05

## 2024-01-05 RX ORDER — LIDOCAINE HYDROCHLORIDE 10 MG/ML
4 INJECTION, SOLUTION EPIDURAL; INFILTRATION; INTRACAUDAL; PERINEURAL
Status: SHIPPED | OUTPATIENT
Start: 2024-01-05

## 2024-01-05 RX ORDER — DICLOFENAC SODIUM 75 MG/1
75 TABLET, DELAYED RELEASE ORAL 2 TIMES DAILY PRN
Qty: 60 TABLET | Refills: 0 | Status: SHIPPED | OUTPATIENT
Start: 2024-01-05 | End: 2024-02-15

## 2024-01-05 RX ORDER — TRIAMCINOLONE ACETONIDE 40 MG/ML
40 INJECTION, SUSPENSION INTRA-ARTICULAR; INTRAMUSCULAR
Status: SHIPPED | OUTPATIENT
Start: 2024-01-05

## 2024-01-05 RX ADMIN — LIDOCAINE HYDROCHLORIDE 4 ML: 10 INJECTION, SOLUTION EPIDURAL; INFILTRATION; INTRACAUDAL; PERINEURAL at 13:32

## 2024-01-05 RX ADMIN — TRIAMCINOLONE ACETONIDE 40 MG: 40 INJECTION, SUSPENSION INTRA-ARTICULAR; INTRAMUSCULAR at 13:32

## 2024-01-05 NOTE — NURSING NOTE
[address] [address]  Dept: [phone]  ______________________________________________________________________________    Patient: Quiana Shepherd   : 1985   MRN: [id number]   2024    INVASIVE PROCEDURE SAFETY CHECKLIST    Date: 2024   Procedure: left IA hip joint injection with kenalog and USG  Patient Name: Quiana Shepherd  MRN: [id number]  YOB: 1985    Action: Complete sections as appropriate. Any discrepancy results in a HARD COPY until resolved.     PRE PROCEDURE:  Patient ID verified with 2 identifiers (name and  or MRN): Yes  Procedure and site verified with patient/designee (when able): Yes  Accurate consent documentation in medical record: Yes  H&P (or appropriate assessment) documented in medical record: Yes  H&P must be up to 20 days prior to procedure and updates within 24 hours of procedure as applicable: NA  Relevant diagnostic and radiology test results appropriately labeled and displayed as applicable: NA  Procedure site(s) marked with provider initials: NA    TIMEOUT:  Time-Out performed immediately prior to starting procedure, including verbal and active participation of all team members addressing the following:Yes  * Correct patient identify  * Confirmed that the correct side and site are marked  * An accurate procedure consent form  * Agreement on the procedure to be done  * Correct patient position  * Relevant images and results are properly labeled and appropriately displayed  * The need to administer antibiotics or fluids for irrigation purposes during the procedure as applicable   * Safety precautions based on patient history or medication use    DURING PROCEDURE: Verification of correct person, site, and procedures any time the responsibility for care of the patient is transferred to another member of the care team.       Prior to injection, verified patient identity using  patient's name and date of birth.  Due to injection administration, patient instructed to remain in clinic for 15 minutes  afterwards, and to report any adverse reaction to me immediately.    Joint injection was performed.      Lido   Drug Amount Wasted:  Yes: 1 mg/ml   Vial/Syringe: Single dose vial  Expiration Date:  05/01/2027    Kenalog  Drug Amount Wasted: None   Vial/Syringe: Single dose vial  Expiration Date: 08/01/2025    Ruby Hawley, ATC  January 5, 2024

## 2024-01-05 NOTE — PROGRESS NOTES
HISTORY OF PRESENT ILLNESS  Ms. Shepherd is a pleasant 38 year old year old female who presents to clinic today with the following:    What problem are you here for: Follow-up for her left hip after injection on 10/10/2023. She reports that pain returned 3-4 weeks ago. She explains that she will have an increase of pain with hip external rotation over the anterior aspect of left hip.     The patient would like to repeat her left hip injection as she is leaving for [place] on 1/8/2024.     How long have you had this problem: Chronic     Have you had any recent imaging of this problem? Xrays/MRI/CT scans:  - No recent imaging.     Have you had treatments for this problem in the past?  -Medications: she will take one Aleve per day.   -Physical therapy: No recent PT for left hip.   -Injections: left IA hip joint injection on 10/10/2023  -Surgery: 2/1/2011 left hip arthroscopy, CAM lesion resection, labral repair and ligamentum teres debridement.   - Chinese medication therapy and massage therapy.     How severe is this problem today? 0-10 scale: 1/10    How severe has this problem been at WORST in the past? 0-10 scale: 9/10    Does this problem or its symptoms cause difficulty for you falling asleep or staying asleep: She reports that she is sleeping ok, she will wake up with pain due to different positions.     MEDICAL HISTORY  Patient Active Problem List   Diagnosis    Family history of melanoma    Acne vulgaris    Old tear of medial meniscus of right knee, unspecified tear type       Current Outpatient Medications   Medication Sig Dispense Refill    dapsone (ACZONE) 7.5 % gel Apply topically daily 90 g 1    levonorgestrel (MIRENA) 20 MCG/24HR IUD 1 each by Intrauterine route once      spironolactone (ALDACTONE) 100 MG tablet Take 2 tablets (200 mg) by mouth daily 180 tablet 3    tretinoin (RETIN-A) 0.025 % external cream Use every night 20 g 1    tretinoin (RETIN-A) 0.05 % external cream Apply topically at bedtime  "20 g 1       No Known Allergies    Family History   Problem Relation Age of Onset    Cancer Mother         melanoma    Melanoma Mother     Cancer Other         Maternal aunt has a hx of melanoma    Melanoma Other         maternal aunt    Skin Cancer No family hx of      Social History     Socioeconomic History    Marital status: Single   Tobacco Use    Smoking status: Never    Smokeless tobacco: Never       Additional medical/Social/Surgical histories reviewed in [facility] and updated as appropriate.     REVIEW OF SYSTEMS (1/5/2024)  10 point ROS of systems including Constitutional, Eyes, Respiratory, Cardiovascular, Gastroenterology, Genitourinary, Integumentary, Musculoskeletal, Psychiatric, Allergic/Immunologic were all negative except for pertinent positives noted in my HPI.     PHYSICAL EXAM  VSS  Vital Signs: Ht 1.702 m (5' 7\")   Wt 72.6 kg (160 lb)   BMI 25.06 kg/m   Patient declined being weighed. Body mass index is 25.06 kg/m .    General  - normal appearance, in no obvious distress  HEENT  - conjunctivae not injected, moist mucous membranes, normocephalic/atraumatic head, ears normal appearance, no lesions, mouth normal appearance, no scars, normal dentition and teeth present  CV  - normal peripheral perfusion  Pulm  - normal respiratory pattern, non-labored  Musculoskeletal - lumbar spine  - stance: normal gait without limp, no obvious leg length discrepancy, normal heel and toe walk  - inspection: normal bone and joint alignment, no obvious scoliosis  - palpation: no paravertebral or bony tenderness  - ROM: flexion exacerbates pain, normal extension, sidebending, rotation  - strength: lower extremities 5/5 in all planes  - special tests:  (+) straight leg raise- left  (+) slump test  Neuro  - patellar and Achilles DTRs 2+ bilaterally, lower extremity sensory deficit throughout L5 distribution, grossly normal coordination, normal muscle tone  Skin  - no ecchymosis, erythema, warmth, or induration, no obvious " rash  Psych  - interactive, appropriate, normal mood and affect  Left hip: has pain with internal and external rotation    ASSESSMENT & PLAN  37 yo female with lumbar ddd, radicular pain, and left hip arthritis and labral tear, worse again    I independently reviewed the following imaging studies:  Left hip xray: shows arthritis  Lumbar xray: shows ddd  Discussed considering lumbar MRI if sciatica symptoms persist  After a 20 minute discussion and examination, we decided to perform a same day injection for diagnostic and therapeutic purposes for  Left hip today  Rx given for medrol and voltaren and tizanadine  Followup in 1 month  Patient has been doing home exercise physical therapy program for this problem      Appropriate PPE was utilized for prevention of spread of Covid-19.  Nii Gomez MD, CAQSM  Intraarticular Hip Injection - Ultrasound Guided  The patient was informed of the risks and the benefits of the procedure and a written consent was signed.  The patient s left  hip was prepped with chlorhexidine in sterile fashion.   40 mg of triamcinolone suspension was drawn up into a 5 mL syringe with 4 mL of 1% lidocaine.  Injection was performed using sterile technique.  Under ultrasound guidance a 3.5-inch 22-gauge needle was used to enter the femoracetabular joint.  Anterior approach was used, needle placement was visualized and documented with ultrasound.  Ultrasound visualization was necessary due to decreased joint space in the setting of osteoarthritis.  Injection performed long axis to the probe.  Injection solution visualized within the joint space.  Images were permanently stored for the patient's record.  There were no complications. The patient tolerated the procedure well. There was negligible bleeding.        Large Joint Injection: L hip joint    Date/Time: 1/5/2024 1:32 PM    Performed by: Nii Gomez MD  Authorized by: Nii Gomez MD    Indications:  Pain and  osteoarthritis  Needle Size:  22 G  Guidance: ultrasound    Approach:  Anterior  Location:  Hip      Site:  L hip joint  Medications:  40 mg triamcinolone 40 MG/ML; 4 mL lidocaine (PF) 1 %  Outcome:  Tolerated well, no immediate complications  Procedure discussed: discussed risks, benefits, and alternatives    Consent Given by:  Patient  Timeout: timeout called immediately prior to procedure    Prep: patient was prepped and draped in usual sterile fashion

## 2024-01-05 NOTE — LETTER
1/5/2024      RE: Quiana Shepherd  [address] [facility] [address]     Dear Colleague,    Thank you for referring your patient, Quiana Shepherd, to the [facility] [place]. Please see a copy of my visit note below.    HISTORY OF PRESENT ILLNESS  Ms. Shepherd is a pleasant 38 year old year old female who presents to clinic today with the following:    What problem are you here for: Follow-up for her left hip after injection on 10/10/2023. She reports that pain returned 3-4 weeks ago. She explains that she will have an increase of pain with hip external rotation over the anterior aspect of left hip.     The patient would like to repeat her left hip injection as she is leaving for [place] on 1/8/2024.     How long have you had this problem: Chronic     Have you had any recent imaging of this problem? Xrays/MRI/CT scans:  - No recent imaging.     Have you had treatments for this problem in the past?  -Medications: she will take one Aleve per day.   -Physical therapy: No recent PT for left hip.   -Injections: left IA hip joint injection on 10/10/2023  -Surgery: 2/1/2011 left hip arthroscopy, CAM lesion resection, labral repair and ligamentum teres debridement.   - Chinese medication therapy and massage therapy.     How severe is this problem today? 0-10 scale: 1/10    How severe has this problem been at WORST in the past? 0-10 scale: 9/10    Does this problem or its symptoms cause difficulty for you falling asleep or staying asleep: She reports that she is sleeping ok, she will wake up with pain due to different positions.     MEDICAL HISTORY  Patient Active Problem List   Diagnosis     Family history of melanoma     Acne vulgaris     Old tear of medial meniscus of right knee, unspecified tear type       Current Outpatient Medications   Medication Sig Dispense Refill     dapsone (ACZONE) 7.5 % gel Apply topically daily 90 g 1     levonorgestrel (MIRENA) 20 MCG/24HR IUD 1 each  "by Intrauterine route once       spironolactone (ALDACTONE) 100 MG tablet Take 2 tablets (200 mg) by mouth daily 180 tablet 3     tretinoin (RETIN-A) 0.025 % external cream Use every night 20 g 1     tretinoin (RETIN-A) 0.05 % external cream Apply topically at bedtime 20 g 1       No Known Allergies    Family History   Problem Relation Age of Onset     Cancer Mother         melanoma     Melanoma Mother      Cancer Other         Maternal aunt has a hx of melanoma     Melanoma Other         maternal aunt     Skin Cancer No family hx of      Social History     Socioeconomic History     Marital status: Single   Tobacco Use     Smoking status: Never     Smokeless tobacco: Never       Additional medical/Social/Surgical histories reviewed in [facility] and updated as appropriate.     REVIEW OF SYSTEMS (1/5/2024)  10 point ROS of systems including Constitutional, Eyes, Respiratory, Cardiovascular, Gastroenterology, Genitourinary, Integumentary, Musculoskeletal, Psychiatric, Allergic/Immunologic were all negative except for pertinent positives noted in my HPI.     PHYSICAL EXAM  VSS  Vital Signs: Ht 1.702 m (5' 7\")   Wt 72.6 kg (160 lb)   BMI 25.06 kg/m   Patient declined being weighed. Body mass index is 25.06 kg/m .    General  - normal appearance, in no obvious distress  HEENT  - conjunctivae not injected, moist mucous membranes, normocephalic/atraumatic head, ears normal appearance, no lesions, mouth normal appearance, no scars, normal dentition and teeth present  CV  - normal peripheral perfusion  Pulm  - normal respiratory pattern, non-labored  Musculoskeletal - lumbar spine  - stance: normal gait without limp, no obvious leg length discrepancy, normal heel and toe walk  - inspection: normal bone and joint alignment, no obvious scoliosis  - palpation: no paravertebral or bony tenderness  - ROM: flexion exacerbates pain, normal extension, sidebending, rotation  - strength: lower extremities 5/5 in all planes  - special " tests:  (+) straight leg raise- left  (+) slump test  Neuro  - patellar and Achilles DTRs 2+ bilaterally, lower extremity sensory deficit throughout L5 distribution, grossly normal coordination, normal muscle tone  Skin  - no ecchymosis, erythema, warmth, or induration, no obvious rash  Psych  - interactive, appropriate, normal mood and affect  Left hip: has pain with internal and external rotation    ASSESSMENT & PLAN  39 yo female with lumbar ddd, radicular pain, and left hip arthritis and labral tear, worse again    I independently reviewed the following imaging studies:  Left hip xray: shows arthritis  Lumbar xray: shows ddd  Discussed considering lumbar MRI if sciatica symptoms persist  After a 20 minute discussion and examination, we decided to perform a same day injection for diagnostic and therapeutic purposes for  Left hip today  Rx given for medrol and voltaren and tizanadine  Followup in 1 month  Patient has been doing home exercise physical therapy program for this problem      Appropriate PPE was utilized for prevention of spread of Covid-19.  Nii Gomez MD, CAQSM  Intraarticular Hip Injection - Ultrasound Guided  The patient was informed of the risks and the benefits of the procedure and a written consent was signed.  The patient s left  hip was prepped with chlorhexidine in sterile fashion.   40 mg of triamcinolone suspension was drawn up into a 5 mL syringe with 4 mL of 1% lidocaine.  Injection was performed using sterile technique.  Under ultrasound guidance a 3.5-inch 22-gauge needle was used to enter the femoracetabular joint.  Anterior approach was used, needle placement was visualized and documented with ultrasound.  Ultrasound visualization was necessary due to decreased joint space in the setting of osteoarthritis.  Injection performed long axis to the probe.  Injection solution visualized within the joint space.  Images were permanently stored for the patient's record.  There were no  complications. The patient tolerated the procedure well. There was negligible bleeding.        Large Joint Injection: L hip joint    Date/Time: 1/5/2024 1:32 PM    Performed by: Nii Gomez MD  Authorized by: Nii Gomez MD    Indications:  Pain and osteoarthritis  Needle Size:  22 G  Guidance: ultrasound    Approach:  Anterior  Location:  Hip      Site:  L hip joint  Medications:  40 mg triamcinolone 40 MG/ML; 4 mL lidocaine (PF) 1 %  Outcome:  Tolerated well, no immediate complications  Procedure discussed: discussed risks, benefits, and alternatives    Consent Given by:  Patient  Timeout: timeout called immediately prior to procedure    Prep: patient was prepped and draped in usual sterile fashion          Again, thank you for allowing me to participate in the care of your patient.      Sincerely,    Nii Gomez MD

## 2024-02-14 ENCOUNTER — MYC MEDICAL ADVICE (OUTPATIENT)
Dept: ORTHOPEDICS | Facility: CLINIC | Age: 39
End: 2024-02-14
Payer: COMMERCIAL

## 2024-02-14 DIAGNOSIS — M54.16 LUMBAR RADICULAR PAIN: ICD-10-CM

## 2024-02-15 RX ORDER — DICLOFENAC SODIUM 75 MG/1
75 TABLET, DELAYED RELEASE ORAL 2 TIMES DAILY PRN
Qty: 60 TABLET | Refills: 1 | Status: SHIPPED | OUTPATIENT
Start: 2024-02-15 | End: 2024-05-30

## 2024-04-19 ENCOUNTER — HOSPITAL ENCOUNTER (OUTPATIENT)
Dept: MRI IMAGING | Facility: CLINIC | Age: 39
Discharge: HOME | End: 2024-04-19
Attending: PREVENTIVE MEDICINE | Admitting: PREVENTIVE MEDICINE
Payer: COMMERCIAL

## 2024-04-19 DIAGNOSIS — M51.16 LUMBAR DISC HERNIATION WITH RADICULOPATHY: ICD-10-CM

## 2024-04-19 DIAGNOSIS — M54.16 LUMBAR RADICULAR PAIN: ICD-10-CM

## 2024-04-19 PROCEDURE — 72148 MRI LUMBAR SPINE W/O DYE: CPT

## 2024-04-24 ENCOUNTER — VIRTUAL VISIT (OUTPATIENT)
Dept: ORTHOPEDICS | Facility: CLINIC | Age: 39
End: 2024-04-24
Payer: COMMERCIAL

## 2024-04-24 DIAGNOSIS — M51.16 LUMBAR DISC HERNIATION WITH RADICULOPATHY: Primary | ICD-10-CM

## 2024-04-24 PROCEDURE — 99442 PR PHYSICIAN TELEPHONE EVALUATION 11-20 MIN: CPT | Mod: 93 | Performed by: PREVENTIVE MEDICINE

## 2024-04-24 NOTE — PROGRESS NOTES
Patient is a  38   year old who is being evaluated via a billable telephone visit.      What phone number would you like to be contacted at? CELL  How would you like to obtain your AVS? LYNN        Subjective   Patient is a   38  year old who presents by phone call visit for the following:     HPI   Had improvement over 50% from left hip joint injection over 3 months ago  Had lumbar MRI completed  And has had more radicular symptoms into her leg, with tingling and pain and burning at times that travels to her shin on left primarily    Review of Systems   Constitutional, HEENT, cardiovascular, pulmonary, gi and gu systems are negative, except as otherwise noted.      Objective           Vitals:  No vitals were obtained today due to virtual visit.    Physical Exam   healthy, alert, and no distress  PSYCH: Alert and oriented times 3; coherent speech, normal   rate and volume, able to articulate logical thoughts, able   to abstract reason, no tangential thoughts, no hallucinations   or delusions  His affect is normal  RESP: No cough, no audible wheezing, able to talk in full sentences  Remainder of exam unable to be completed due to telephone visits    Assessment/Plan  37 yo female with left hip arthritis, not resolved, stable, and lumbar ddd, discogenic pain radicular pain      I independently reviewed the following imaging studies and discussed with patient:  Lumbar MRI shows ddd, disc herniations  Discussed and ordered LUISA due to ongoing radiculopathy  And will also plan to perform left hip joint injection at the end of May prior to her trip overseas  Cont. HEP and medications          Phone call duration: 20 minutes  Phone call start: 130pm  Phone call end: 150pm  Dr Gomez

## 2024-04-24 NOTE — LETTER
4/24/2024         RE: Quiana Shepherd  [address] [facility] [address]        Dear Colleague,    Thank you for referring your patient, Quiana Shepherd, to the [facility] [place]. Please see a copy of my visit note below.    Patient is a  38   year old who is being evaluated via a billable telephone visit.      What phone number would you like to be contacted at? CELL  How would you like to obtain your AVS? MYCHART        Subjective   Patient is a   38  year old who presents by phone call visit for the following:     HPI   Had improvement over 50% from left hip joint injection over 3 months ago  Had lumbar MRI completed  And has had more radicular symptoms into her leg, with tingling and pain and burning at times that travels to her shin on left primarily    Review of Systems   Constitutional, HEENT, cardiovascular, pulmonary, gi and gu systems are negative, except as otherwise noted.      Objective           Vitals:  No vitals were obtained today due to virtual visit.    Physical Exam   healthy, alert, and no distress  PSYCH: Alert and oriented times 3; coherent speech, normal   rate and volume, able to articulate logical thoughts, able   to abstract reason, no tangential thoughts, no hallucinations   or delusions  His affect is normal  RESP: No cough, no audible wheezing, able to talk in full sentences  Remainder of exam unable to be completed due to telephone visits    Assessment/Plan  39 yo female with left hip arthritis, not resolved, stable, and lumbar ddd, discogenic pain radicular pain      I independently reviewed the following imaging studies and discussed with patient:  Lumbar MRI shows ddd, disc herniations  Discussed and ordered LUISA due to ongoing radiculopathy  And will also plan to perform left hip joint injection at the end of May prior to her trip overseas  Cont. HEP and medications          Phone call duration: 20 minutes  Phone call start: 130pm  Phone  call end: 150pm  Dr Gomez      Again, thank you for allowing me to participate in the care of your patient.        Sincerely,        Nii Gomez MD

## 2024-04-24 NOTE — LETTER
4/24/2024         RE: Quiana Shepherd  [address] [facility] [address]        Dear Colleague,    Thank you for referring your patient, Quiana Shepherd, to the [facility] [place]. Please see a copy of my visit note below.    Patient is a  38   year old who is being evaluated via a billable telephone visit.      What phone number would you like to be contacted at? CELL  How would you like to obtain your AVS? MYCHART        Subjective   Patient is a   38  year old who presents by phone call visit for the following:     HPI   Had improvement over 50% from left hip joint injection over 3 months ago  Had lumbar MRI completed  And has had more radicular symptoms into her leg, with tingling and pain and burning at times that travels to her shin on left primarily    Review of Systems   Constitutional, HEENT, cardiovascular, pulmonary, gi and gu systems are negative, except as otherwise noted.      Objective           Vitals:  No vitals were obtained today due to virtual visit.    Physical Exam   healthy, alert, and no distress  PSYCH: Alert and oriented times 3; coherent speech, normal   rate and volume, able to articulate logical thoughts, able   to abstract reason, no tangential thoughts, no hallucinations   or delusions  His affect is normal  RESP: No cough, no audible wheezing, able to talk in full sentences  Remainder of exam unable to be completed due to telephone visits    Assessment/Plan  37 yo female with left hip arthritis, not resolved, stable, and lumbar ddd, discogenic pain radicular pain      I independently reviewed the following imaging studies and discussed with patient:  Lumbar MRI shows ddd, disc herniations  Discussed and ordered LUISA due to ongoing radiculopathy  And will also plan to perform left hip joint injection at the end of May prior to her trip overseas  Cont. HEP and medications          Phone call duration: 20 minutes  Phone call start: 130pm  Phone  call end: 150pm  Dr Gomez      Again, thank you for allowing me to participate in the care of your patient.        Sincerely,        Nii Gomez MD

## 2024-04-26 ENCOUNTER — ANCILLARY PROCEDURE (OUTPATIENT)
Dept: GENERAL RADIOLOGY | Facility: CLINIC | Age: 39
End: 2024-04-26
Attending: PREVENTIVE MEDICINE
Payer: COMMERCIAL

## 2024-04-26 DIAGNOSIS — M51.16 LUMBAR DISC HERNIATION WITH RADICULOPATHY: ICD-10-CM

## 2024-04-26 PROCEDURE — 62323 NJX INTERLAMINAR LMBR/SAC: CPT | Performed by: RADIOLOGY

## 2024-04-26 RX ORDER — METHYLPREDNISOLONE ACETATE 80 MG/ML
80 INJECTION, SUSPENSION INTRA-ARTICULAR; INTRALESIONAL; INTRAMUSCULAR; SOFT TISSUE ONCE
Status: COMPLETED | OUTPATIENT
Start: 2024-04-26 | End: 2024-04-26

## 2024-04-26 RX ORDER — BUPIVACAINE HYDROCHLORIDE 5 MG/ML
10 INJECTION, SOLUTION EPIDURAL; INTRACAUDAL ONCE
Status: COMPLETED | OUTPATIENT
Start: 2024-04-26 | End: 2024-04-26

## 2024-04-26 RX ORDER — IOPAMIDOL 408 MG/ML
2 INJECTION, SOLUTION INTRATHECAL ONCE
Status: COMPLETED | OUTPATIENT
Start: 2024-04-26 | End: 2024-04-26

## 2024-04-26 RX ORDER — LIDOCAINE HYDROCHLORIDE 10 MG/ML
5 INJECTION, SOLUTION EPIDURAL; INFILTRATION; INTRACAUDAL; PERINEURAL ONCE
Status: COMPLETED | OUTPATIENT
Start: 2024-04-26 | End: 2024-04-26

## 2024-04-26 RX ADMIN — IOPAMIDOL 2 ML: 408 INJECTION, SOLUTION INTRATHECAL at 10:16

## 2024-04-26 RX ADMIN — BUPIVACAINE HYDROCHLORIDE 50 MG: 5 INJECTION, SOLUTION EPIDURAL; INTRACAUDAL at 10:16

## 2024-04-26 RX ADMIN — METHYLPREDNISOLONE ACETATE 80 MG: 80 INJECTION, SUSPENSION INTRA-ARTICULAR; INTRALESIONAL; INTRAMUSCULAR; SOFT TISSUE at 10:16

## 2024-04-26 RX ADMIN — LIDOCAINE HYDROCHLORIDE 5 ML: 10 INJECTION, SOLUTION EPIDURAL; INFILTRATION; INTRACAUDAL; PERINEURAL at 10:16

## 2024-04-26 NOTE — DISCHARGE INSTRUCTIONS
AFTER YOU GO HOME    DO relax; minimize your activity for 24 hours  You may resume normal activity tomorrow  You may remove the bandage in the evening or next morning  You may resume bathing the next day  Drink at least 4 extra glasses of fluid today if not on fluid restrictions  DO NOT drive or operate machinery at home or at work for at least 24 hours      VISIT THE EMERGENCY ROOM OR URGENT CARE IF:    There is redness or swelling at the injection site  There is discharge from the injection site  You develop a temperature of 101  F or greater      ADDITIONAL INSTRUCTIONS:     You may resume your Coumadin or other blood thinner at your regular dose today.  Follow up with your physician to have your INR rechecked if indicated.  If you gain no relief from the injection after two (2) weeks, follow-up with your provider for your options.        Contacts:    During business hours from 8 to 5 pm, you may call [phone] to reach a nurse advisor at [facility].  After hours, call [facility]  [phone].  Ask for the Radiologist on-call.  Someone is on-call 24 hrs/day.  [facility] Toll Free Number   [phone]

## 2024-04-26 NOTE — PROGRESS NOTES
Quiana was seen in X-ray today for a epidural injection. Patient rated pain before procedure 2/10. After procedure patient rated pain 0/10.   This pain level is acceptable to patient. Patient discharged home with .

## 2024-05-30 ENCOUNTER — LAB (OUTPATIENT)
Dept: LAB | Facility: CLINIC | Age: 39
End: 2024-05-30
Payer: COMMERCIAL

## 2024-05-30 ENCOUNTER — OFFICE VISIT (OUTPATIENT)
Dept: ORTHOPEDICS | Facility: CLINIC | Age: 39
End: 2024-05-30
Payer: COMMERCIAL

## 2024-05-30 DIAGNOSIS — E07.9 SWELLING OF THYROID GLAND: ICD-10-CM

## 2024-05-30 DIAGNOSIS — M16.12 ARTHRITIS OF LEFT HIP: Primary | ICD-10-CM

## 2024-05-30 DIAGNOSIS — M54.16 LUMBAR RADICULAR PAIN: ICD-10-CM

## 2024-05-30 LAB
ALBUMIN SERPL BCG-MCNC: 4.4 G/DL (ref 3.5–5.2)
ALP SERPL-CCNC: 63 U/L (ref 40–150)
ALT SERPL W P-5'-P-CCNC: 11 U/L (ref 0–50)
ANION GAP SERPL CALCULATED.3IONS-SCNC: 10 MMOL/L (ref 7–15)
AST SERPL W P-5'-P-CCNC: 21 U/L (ref 0–45)
BASOPHILS # BLD AUTO: 0 10E3/UL (ref 0–0.2)
BASOPHILS NFR BLD AUTO: 1 %
BILIRUB SERPL-MCNC: 0.2 MG/DL
BUN SERPL-MCNC: 12.7 MG/DL (ref 6–20)
CALCIUM SERPL-MCNC: 9.3 MG/DL (ref 8.6–10)
CHLORIDE SERPL-SCNC: 102 MMOL/L (ref 98–107)
CREAT SERPL-MCNC: 0.81 MG/DL (ref 0.51–0.95)
DEPRECATED HCO3 PLAS-SCNC: 24 MMOL/L (ref 22–29)
EGFRCR SERPLBLD CKD-EPI 2021: >90 ML/MIN/1.73M2
EOSINOPHIL # BLD AUTO: 0.1 10E3/UL (ref 0–0.7)
EOSINOPHIL NFR BLD AUTO: 2 %
ERYTHROCYTE [DISTWIDTH] IN BLOOD BY AUTOMATED COUNT: 12.5 % (ref 10–15)
GLUCOSE SERPL-MCNC: 107 MG/DL (ref 70–99)
HCT VFR BLD AUTO: 40.7 % (ref 35–47)
HGB BLD-MCNC: 14 G/DL (ref 11.7–15.7)
IMM GRANULOCYTES # BLD: 0 10E3/UL
IMM GRANULOCYTES NFR BLD: 0 %
LYMPHOCYTES # BLD AUTO: 2.2 10E3/UL (ref 0.8–5.3)
LYMPHOCYTES NFR BLD AUTO: 29 %
MCH RBC QN AUTO: 32.6 PG (ref 26.5–33)
MCHC RBC AUTO-ENTMCNC: 34.4 G/DL (ref 31.5–36.5)
MCV RBC AUTO: 95 FL (ref 78–100)
MONOCYTES # BLD AUTO: 0.6 10E3/UL (ref 0–1.3)
MONOCYTES NFR BLD AUTO: 7 %
NEUTROPHILS # BLD AUTO: 4.7 10E3/UL (ref 1.6–8.3)
NEUTROPHILS NFR BLD AUTO: 61 %
NRBC # BLD AUTO: 0 10E3/UL
NRBC BLD AUTO-RTO: 0 /100
PLATELET # BLD AUTO: 284 10E3/UL (ref 150–450)
POTASSIUM SERPL-SCNC: 4.1 MMOL/L (ref 3.4–5.3)
PROT SERPL-MCNC: 6.9 G/DL (ref 6.4–8.3)
RBC # BLD AUTO: 4.29 10E6/UL (ref 3.8–5.2)
SODIUM SERPL-SCNC: 136 MMOL/L (ref 135–145)
TSH SERPL DL<=0.005 MIU/L-ACNC: 0.93 UIU/ML (ref 0.3–4.2)
WBC # BLD AUTO: 7.6 10E3/UL (ref 4–11)

## 2024-05-30 PROCEDURE — 99214 OFFICE O/P EST MOD 30 MIN: CPT | Mod: 25 | Performed by: PREVENTIVE MEDICINE

## 2024-05-30 PROCEDURE — 85025 COMPLETE CBC W/AUTO DIFF WBC: CPT | Performed by: PATHOLOGY

## 2024-05-30 PROCEDURE — 80053 COMPREHEN METABOLIC PANEL: CPT | Performed by: PATHOLOGY

## 2024-05-30 PROCEDURE — 84443 ASSAY THYROID STIM HORMONE: CPT | Performed by: PATHOLOGY

## 2024-05-30 PROCEDURE — 20611 DRAIN/INJ JOINT/BURSA W/US: CPT | Mod: LT | Performed by: PREVENTIVE MEDICINE

## 2024-05-30 PROCEDURE — 36415 COLL VENOUS BLD VENIPUNCTURE: CPT | Performed by: PATHOLOGY

## 2024-05-30 RX ORDER — LIDOCAINE HYDROCHLORIDE 10 MG/ML
4 INJECTION, SOLUTION EPIDURAL; INFILTRATION; INTRACAUDAL; PERINEURAL
Status: SHIPPED | OUTPATIENT
Start: 2024-05-30

## 2024-05-30 RX ORDER — TRIAMCINOLONE ACETONIDE 40 MG/ML
40 INJECTION, SUSPENSION INTRA-ARTICULAR; INTRAMUSCULAR
Status: SHIPPED | OUTPATIENT
Start: 2024-05-30

## 2024-05-30 RX ORDER — DICLOFENAC SODIUM 75 MG/1
75 TABLET, DELAYED RELEASE ORAL 2 TIMES DAILY PRN
Qty: 60 TABLET | Refills: 1 | Status: SHIPPED | OUTPATIENT
Start: 2024-05-30

## 2024-05-30 RX ADMIN — TRIAMCINOLONE ACETONIDE 40 MG: 40 INJECTION, SUSPENSION INTRA-ARTICULAR; INTRAMUSCULAR at 16:33

## 2024-05-30 RX ADMIN — LIDOCAINE HYDROCHLORIDE 4 ML: 10 INJECTION, SOLUTION EPIDURAL; INFILTRATION; INTRACAUDAL; PERINEURAL at 16:33

## 2024-05-30 NOTE — NURSING NOTE
[address] [address]  Dept: [phone]  ______________________________________________________________________________    Patient: Quiana Shepherd   : 1985   MRN: [id number]   May 30, 2024    INVASIVE PROCEDURE SAFETY CHECKLIST    Date: 24   Procedure: Left IA Hip USG CSI  Patient Name: Quiana Shepherd  MRN: [id number]  YOB: 1985    Action: Complete sections as appropriate. Any discrepancy results in a HARD COPY until resolved.     PRE PROCEDURE:  Patient ID verified with 2 identifiers (name and  or MRN): Yes  Procedure and site verified with patient/designee (when able): Yes  Accurate consent documentation in medical record: Yes  H&P (or appropriate assessment) documented in medical record: Yes  H&P must be up to 20 days prior to procedure and updates within 24 hours of procedure as applicable: NA  Relevant diagnostic and radiology test results appropriately labeled and displayed as applicable: Yes  Procedure site(s) marked with provider initials: NA    TIMEOUT:  Time-Out performed immediately prior to starting procedure, including verbal and active participation of all team members addressing the following:Yes  * Correct patient identify  * Confirmed that the correct side and site are marked  * An accurate procedure consent form  * Agreement on the procedure to be done  * Correct patient position  * Relevant images and results are properly labeled and appropriately displayed  * The need to administer antibiotics or fluids for irrigation purposes during the procedure as applicable   * Safety precautions based on patient history or medication use    DURING PROCEDURE: Verification of correct person, site, and procedures any time the responsibility for care of the patient is transferred to another member of the care team.       Prior to injection, verified patient identity using patient's name and date of birth.  Due to  injection administration, patient instructed to remain in clinic for 15 minutes  afterwards, and to report any adverse reaction to me immediately.    Joint injection was performed.      Drug Amount Wasted:  Yes: 1 mg/ml   Vial/Syringe: Multi dose vial  Expiration Date:  5/1/27      Castillo Ingram, ATC  May 30, 2024

## 2024-05-30 NOTE — PROGRESS NOTES
Large Joint Injection: L hip joint    Date/Time: 5/30/2024 4:33 PM    Performed by: Nii Gomez MD  Authorized by: Nii Gomez MD    Indications:  Pain and osteoarthritis  Needle Size:  22 G  Guidance: ultrasound    Approach:  Anterior  Location:  Hip      Site:  L hip joint  Medications:  40 mg triamcinolone 40 MG/ML; 4 mL lidocaine (PF) 1 %  Outcome:  Tolerated well, no immediate complications  Procedure discussed: discussed risks, benefits, and alternatives    Consent Given by:  Patient  Timeout: timeout called immediately prior to procedure    Prep: patient was prepped and draped in usual sterile fashion       Castillo Ingram M.A., LAT, ATC  Certified Athletic Trainer

## 2024-05-30 NOTE — LETTER
5/30/2024      RE: Quiana Shepherd  [address] [facility] [address]     Dear Colleague,    Thank you for referring your patient, Quiana Shepherd, to the [facility] [place]. Please see a copy of my visit note below.    Quiana returns for left hip injection as discussed  Last injection improved her pain and symptoms greater than 50% for more than 3 months but did not resolve her hip pain  She had her epidural injection last month and developed thyroid swelling, she has a normal tsh from 2022 however she has a strong family history of thyroid issues  HISTORY OF PRESENT ILLNESS  Ms. Shepherd is a pleasant 38 year old year old female who presents to clinic today with the following:  What problem are you here for? Followup for lumbar epidural injection and for left hip injection  Also notes that she developed thyroid swelling after she had contrast dye with the lumbar epdiural  Her low back pain and radicular symptoms improved over 80% after epidural injection   She has a trip overseas coming up and will require a lot of walking and biking and [facility]          MEDICAL HISTORY  Patient Active Problem List   Diagnosis     Family history of melanoma     Acne vulgaris     Old tear of medial meniscus of right knee, unspecified tear type       Current Outpatient Medications   Medication Sig Dispense Refill     dapsone (ACZONE) 7.5 % gel Apply topically daily 90 g 1     diclofenac (VOLTAREN) 75 MG EC tablet Take 1 tablet (75 mg) by mouth 2 times daily as needed for moderate pain 60 tablet 1     levonorgestrel (MIRENA) 20 MCG/24HR IUD 1 each by Intrauterine route once       methylPREDNISolone (MEDROL DOSEPAK) 4 MG tablet therapy pack Follow Package Directions 21 tablet 0     spironolactone (ALDACTONE) 100 MG tablet Take 2 tablets (200 mg) by mouth daily 180 tablet 3     tiZANidine (ZANAFLEX) 4 MG tablet Take 1 tablet (4 mg) by mouth nightly as  needed for muscle spasms 30 tablet 0     tretinoin (RETIN-A) 0.025 % external cream Use every night 20 g 1     tretinoin (RETIN-A) 0.05 % external cream Apply topically at bedtime 20 g 1       Allergies   Allergen Reactions     Contrast Dye Other (See Comments)     Notes thyroid swelling after a contrast injection for a lumbar LUISA       Family History   Problem Relation Age of Onset     Cancer Mother         melanoma     Melanoma Mother      Cancer Other         Maternal aunt has a hx of melanoma     Melanoma Other         maternal aunt     Skin Cancer No family hx of      Social History     Socioeconomic History     Marital status: Single   Tobacco Use     Smoking status: Never     Smokeless tobacco: Never       Additional medical/Social/Surgical histories reviewed in [facility] and updated as appropriate.     REVIEW OF SYSTEMS (5/30/2024)  10 point ROS of systems including Constitutional, Eyes, Respiratory, Cardiovascular, Gastroenterology, Genitourinary, Integumentary, Musculoskeletal, Psychiatric, Allergic/Immunologic were all negative except for pertinent positives noted in my HPI.     PHYSICAL EXAM  VSS    General  - normal appearance, in no obvious distress  HEENT  - conjunctivae not injected, moist mucous membranes, normocephalic/atraumatic head, ears normal appearance, no lesions, mouth normal appearance, no scars, normal dentition and teeth present  CV  - normal femoral pulse  Pulm  - normal respiratory pattern, non-labored  Musculoskeletal - left hip  - stance: gait slightly favors affected side, no obvious leg length discrepancy, normal heel and toe walk  - inspection: no swelling or effusion,  normal bone and joint alignment, no obvious deformity  - palpation: no lateral or anterior hip tenderness  - ROM: limited flexion and internal rotation secondary to pain, pain with passive and active ROM   - strength: 5/5 in all planes  - special tests:  (-) PATRICIA  (-) FADIR  no pain with axial femoral load  Neuro  - no  sensory or motor deficit, grossly normal coordination, normal muscle tone  Skin  - no ecchymosis, erythema, warmth, or induration, no obvious rash  Psych  - interactive, appropriate, normal mood and affect   ASSESSMENT & PLAN  39 yo female with left hip arthritis, pain, worsening, and lumbar ddd, disc herniations, radicular pain, improved  And thyroid swelling, subacute after contrast dye injection    I independently reviewed the following imaging studies:  Left hip xray: shows arthritis  After a 20 minute discussion and examination, we decided to perform a same day injection for diagnostic and therapeutic purposes for  Left hip  Discussed followup later this summer for new xrays of hip and consider MRI and revisting surgeon if her hip injection wears off  Cont. Medications as written  Patient has been doing home exercise physical therapy program for this problem      Appropriate PPE was utilized for prevention of spread of Covid-19.  Nii Gomez MD, [facility]      Diagnosis: left hip arthritis    Intraarticular Hip Injection - Ultrasound Guided  The patient was informed of the risks and the benefits of the procedure and a written consent was signed.  The patient s left  hip was prepped with chlorhexidine in sterile fashion.   40 mg of triamcinolone suspension was drawn up into a 5 mL syringe with 4 mL of 1% lidocaine.  Injection was performed using sterile technique.  Under ultrasound guidance a 3.5-inch 22-gauge needle was used to enter the femoracetabular joint.  Anterior approach was used, needle placement was visualized and documented with ultrasound.  Ultrasound visualization was necessary due to decreased joint space in the setting of osteoarthritis.  Injection performed long axis to the probe.  Injection solution visualized within the joint space.  Images were permanently stored for the patient's record.  There were no complications. The patient tolerated the procedure well. There was negligible bleeding.               Large Joint Injection: L hip joint    Date/Time: 5/30/2024 4:33 PM    Performed by: Nii Gomez MD  Authorized by: Nii Gomez MD    Indications:  Pain and osteoarthritis  Needle Size:  22 G  Guidance: ultrasound    Approach:  Anterior  Location:  Hip      Site:  L hip joint  Medications:  40 mg triamcinolone 40 MG/ML; 4 mL lidocaine (PF) 1 %  Outcome:  Tolerated well, no immediate complications  Procedure discussed: discussed risks, benefits, and alternatives    Consent Given by:  Patient  Timeout: timeout called immediately prior to procedure    Prep: patient was prepped and draped in usual sterile fashion       Castillo Ingram M.A., LAT, ATC  Certified Athletic Trainer            Again, thank you for allowing me to participate in the care of your patient.      Sincerely,    Nii Gomez MD

## 2024-05-30 NOTE — PROGRESS NOTES
Quiana returns for left hip injection as discussed  Last injection improved her pain and symptoms greater than 50% for more than 3 months but did not resolve her hip pain  She had her epidural injection last month and developed thyroid swelling, she has a normal tsh from 2022 however she has a strong family history of thyroid issues  HISTORY OF PRESENT ILLNESS  Ms. Shepherd is a pleasant 38 year old year old female who presents to clinic today with the following:  What problem are you here for? Followup for lumbar epidural injection and for left hip injection  Also notes that she developed thyroid swelling after she had contrast dye with the lumbar epdiural  Her low back pain and radicular symptoms improved over 80% after epidural injection   She has a trip overseas coming up and will require a lot of walking and biking and [facility]          MEDICAL HISTORY  Patient Active Problem List   Diagnosis    Family history of melanoma    Acne vulgaris    Old tear of medial meniscus of right knee, unspecified tear type       Current Outpatient Medications   Medication Sig Dispense Refill    dapsone (ACZONE) 7.5 % gel Apply topically daily 90 g 1    diclofenac (VOLTAREN) 75 MG EC tablet Take 1 tablet (75 mg) by mouth 2 times daily as needed for moderate pain 60 tablet 1    levonorgestrel (MIRENA) 20 MCG/24HR IUD 1 each by Intrauterine route once      methylPREDNISolone (MEDROL DOSEPAK) 4 MG tablet therapy pack Follow Package Directions 21 tablet 0    spironolactone (ALDACTONE) 100 MG tablet Take 2 tablets (200 mg) by mouth daily 180 tablet 3    tiZANidine (ZANAFLEX) 4 MG tablet Take 1 tablet (4 mg) by mouth nightly as needed for muscle spasms 30 tablet 0    tretinoin (RETIN-A) 0.025 % external cream Use every night 20 g 1    tretinoin (RETIN-A) 0.05 % external cream Apply topically at bedtime 20 g 1       Allergies   Allergen Reactions    Contrast Dye Other (See Comments)     Notes thyroid  swelling after a contrast injection for a lumbar LUISA       Family History   Problem Relation Age of Onset    Cancer Mother         melanoma    Melanoma Mother     Cancer Other         Maternal aunt has a hx of melanoma    Melanoma Other         maternal aunt    Skin Cancer No family hx of      Social History     Socioeconomic History    Marital status: Single   Tobacco Use    Smoking status: Never    Smokeless tobacco: Never       Additional medical/Social/Surgical histories [facility] and updated as appropriate.     REVIEW OF SYSTEMS (5/30/2024)  10 point ROS of systems including Constitutional, Eyes, Respiratory, Cardiovascular, Gastroenterology, Genitourinary, Integumentary, Musculoskeletal, Psychiatric, Allergic/Immunologic were all negative except for pertinent positives noted in my HPI.     PHYSICAL EXAM  VSS    General  - normal appearance, in no obvious distress  HEENT  - conjunctivae not injected, moist mucous membranes, normocephalic/atraumatic head, ears normal appearance, no lesions, mouth normal appearance, no scars, normal dentition and teeth present  CV  - normal femoral pulse  Pulm  - normal respiratory pattern, non-labored  Musculoskeletal - left hip  - stance: gait slightly favors affected side, no obvious leg length discrepancy, normal heel and toe walk  - inspection: no swelling or effusion,  normal bone and joint alignment, no obvious deformity  - palpation: no lateral or anterior hip tenderness  - ROM: limited flexion and internal rotation secondary to pain, pain with passive and active ROM   - strength: 5/5 in all planes  - special tests:  (-) PATRICIA  (-) FADIR  no pain with axial femoral load  Neuro  - no sensory or motor deficit, grossly normal coordination, normal muscle tone  Skin  - no ecchymosis, erythema, warmth, or induration, no obvious rash  Psych  - interactive, appropriate, normal mood and affect   ASSESSMENT & PLAN  39 yo female with left hip arthritis, pain, worsening, and  lumbar ddd, disc herniations, radicular pain, improved  And thyroid swelling, subacute after contrast dye injection    I independently reviewed the following imaging studies:  Left hip xray: shows arthritis  After a 20 minute discussion and examination, we decided to perform a same day injection for diagnostic and therapeutic purposes for  Left hip  Discussed followup later this summer for new xrays of hip and consider MRI and revisting surgeon if her hip injection wears off  Cont. Medications as written  Patient has been doing home exercise physical therapy program for this problem      Appropriate PPE was utilized for prevention of spread of Covid-19.  Nii Gomez MD, [facility]      Diagnosis: left hip arthritis    Intraarticular Hip Injection - Ultrasound Guided  The patient was informed of the risks and the benefits of the procedure and a written consent was signed.  The patient s left  hip was prepped with chlorhexidine in sterile fashion.   40 mg of triamcinolone suspension was drawn up into a 5 mL syringe with 4 mL of 1% lidocaine.  Injection was performed using sterile technique.  Under ultrasound guidance a 3.5-inch 22-gauge needle was used to enter the femoracetabular joint.  Anterior approach was used, needle placement was visualized and documented with ultrasound.  Ultrasound visualization was necessary due to decreased joint space in the setting of osteoarthritis.  Injection performed long axis to the probe.  Injection solution visualized within the joint space.  Images were permanently stored for the patient's record.  There were no complications. The patient tolerated the procedure well. There was negligible bleeding.

## 2024-05-31 RX ORDER — DICLOFENAC SODIUM 75 MG/1
75 TABLET, DELAYED RELEASE ORAL 2 TIMES DAILY PRN
Qty: 60 TABLET | Refills: 1 | Status: SHIPPED | OUTPATIENT
Start: 2024-05-31

## 2024-05-31 NOTE — ADDENDUM NOTE
Addended by: ANG SMITH on: 5/31/2024 10:03 AM     Modules accepted: Orders     Patient, along with paperwork, transported via wheelchair by [address] [facility] to the main entrance to prepare for transfer to [facility]

## 2024-06-23 ENCOUNTER — HEALTH MAINTENANCE LETTER (OUTPATIENT)
Age: 39
End: 2024-06-23

## 2024-09-30 DIAGNOSIS — M16.32 OSTEOARTHRITIS OF LEFT HIP JOINT DUE TO DYSPLASIA: Primary | ICD-10-CM

## 2024-10-01 ENCOUNTER — OFFICE VISIT (OUTPATIENT)
Dept: ORTHOPEDICS | Facility: CLINIC | Age: 39
End: 2024-10-01
Payer: COMMERCIAL

## 2024-10-01 DIAGNOSIS — M51.16 LUMBAR DISC HERNIATION WITH RADICULOPATHY: ICD-10-CM

## 2024-10-01 DIAGNOSIS — M16.12 ARTHRITIS OF LEFT HIP: Primary | ICD-10-CM

## 2024-10-01 DIAGNOSIS — M54.16 LUMBAR RADICULAR PAIN: ICD-10-CM

## 2024-10-01 DIAGNOSIS — M76.02 GLUTEAL TENDINITIS OF LEFT BUTTOCK: ICD-10-CM

## 2024-10-01 PROCEDURE — 99214 OFFICE O/P EST MOD 30 MIN: CPT | Performed by: PREVENTIVE MEDICINE

## 2024-10-01 RX ORDER — DICLOFENAC SODIUM 75 MG/1
75 TABLET, DELAYED RELEASE ORAL 2 TIMES DAILY PRN
Qty: 180 TABLET | Refills: 1 | Status: SHIPPED | OUTPATIENT
Start: 2024-10-01

## 2024-10-01 NOTE — PROGRESS NOTES
"HISTORY OF PRESENT ILLNESS  Ms. Shepherd is a pleasant 39 year old year old female who presents to clinic today with the following:  What problem are you here for?  Left hip pain/back pain   Left gluteal pain worse  How long have you had this problem? Chronic    Have you had any recent imaging of this problem? Xrays/MRI/CT scans? Yes, Hip XR 7/8/21, Lumbar spine MRI 4/19/24     Have you had treatments for this problem in the past?  -Medications? Diclofenac 1 x/daily and Aleve or Tylenol prn   -Physical therapy? Nothing recently  -Injections? Yes, LUISA 4/26/24  -Surgery? Left hip arthroscopy 2/1/11    How severe is this problem today? 0-10 scale? 2/10    How severe has this problem been at WORST in the past? 0-10 scale? 9/10    Does this problem or its symptoms cause difficulty for you falling asleep or staying asleep? No    Anything else you want us to know about this problem? Today, the patient reports that the previous left hip injection on 5/30/24 worked very well for her, she has not been experiencing any groin pain. She states that she is no longer getting \"lightening bolt\" pain that radiates down to her foot, but is getting pain in the middle of her glute.           MEDICAL HISTORY  Patient Active Problem List   Diagnosis    Family history of melanoma    Acne vulgaris    Old tear of medial meniscus of right knee, unspecified tear type       Current Outpatient Medications   Medication Sig Dispense Refill    dapsone (ACZONE) 7.5 % gel Apply topically daily 90 g 1    diclofenac (VOLTAREN) 75 MG EC tablet Take 1 tablet (75 mg) by mouth 2 times daily as needed for moderate pain 60 tablet 1    diclofenac (VOLTAREN) 75 MG EC tablet Take 1 tablet (75 mg) by mouth 2 times daily as needed for moderate pain 60 tablet 1    levonorgestrel (MIRENA) 20 MCG/24HR IUD 1 each by Intrauterine route once      methylPREDNISolone (MEDROL DOSEPAK) 4 MG tablet therapy pack Follow Package Directions 21 tablet 0    spironolactone " (ALDACTONE) 100 MG tablet Take 2 tablets (200 mg) by mouth daily 180 tablet 3    tiZANidine (ZANAFLEX) 4 MG tablet Take 1 tablet (4 mg) by mouth nightly as needed for muscle spasms 30 tablet 1    tiZANidine (ZANAFLEX) 4 MG tablet Take 1 tablet (4 mg) by mouth nightly as needed for muscle spasms 30 tablet 0    tretinoin (RETIN-A) 0.025 % external cream Use every night 20 g 1    tretinoin (RETIN-A) 0.05 % external cream Apply topically at bedtime 20 g 1       Allergies   Allergen Reactions    Contrast Dye Other (See Comments)     Notes thyroid swelling after a contrast injection for a lumbar LUISA       Family History   Problem Relation Age of Onset    Cancer Mother         melanoma    Melanoma Mother     Cancer Other         Maternal aunt has a hx of melanoma    Melanoma Other         maternal aunt    Skin Cancer No family hx of      Social History     Socioeconomic History    Marital status: Single   Tobacco Use    Smoking status: Never    Smokeless tobacco: Never       Additional medical/Social/Surgical histories reviewed in [facility] and updated as appropriate.     REVIEW OF SYSTEMS (10/1/2024)  10 point ROS of systems including Constitutional, Eyes, Respiratory, Cardiovascular, Gastroenterology, Genitourinary, Integumentary, Musculoskeletal, Psychiatric, Allergic/Immunologic were all negative except for pertinent positives noted in my HPI.     PHYSICAL EXAM  VSS    General  - normal appearance, in no obvious distress  HEENT  - conjunctivae not injected, moist mucous membranes, normocephalic/atraumatic head, ears normal appearance, no lesions, mouth normal appearance, no scars, normal dentition and teeth present  CV  - normal femoral pulse  Pulm  - normal respiratory pattern, non-labored  Musculoskeletal - left hip  - stance: gait slightly favors affected side, no obvious leg length discrepancy, normal heel and toe walk  - inspection: no swelling or effusion,  normal bone and joint alignment, no obvious deformity  -  palpation: no lateral or anterior hip tenderness, except over posterior hip and gluteal muscles  - ROM:some limited flexion and internal rotation secondary to pain, pain with passive and active ROM   - strength: 5/5 in all planes  - special tests:  (-) PATRICIA  (-) FADIR  no pain with axial femoral load  Neuro  - no sensory or motor deficit, grossly normal coordination, normal muscle tone  Skin  - no ecchymosis, erythema, warmth, or induration, no obvious rash  Psych  - interactive, appropriate, normal mood and affect  Lumbar : has some pain with extension, positive SLR On left  ASSESSMENT & PLAN  38 yo female with lumbar ddd, disc herniations,radiculopathy, worse, and left hip arthritis    I independently reviewed the following imaging studies:  Lumbar MRI shows ddd, disc herniations  Discussed can consider LUISA  Will see hip surgeon and discuss options for left hip treatment  Consider lumbar MRI as her symptoms have worsened this summer  Rx refilled for voltaren       Appropriate PPE was utilized for prevention of spread of Covid-19.  Nii Gomez MD, CAQSM

## 2024-10-01 NOTE — LETTER
"  10/1/2024      RE: Quiana Shepherd  [address] [facility] [address]     Dear Colleague,    Thank you for referring your patient, Quiana Shepherd, to the [facility] [place]. Please see a copy of my visit note below.    HISTORY OF PRESENT ILLNESS  Ms. Shepherd is a pleasant 39 year old year old female who presents to clinic today with the following:  What problem are you here for?  Left hip pain/back pain   Left gluteal pain worse  How long have you had this problem? Chronic    Have you had any recent imaging of this problem? Xrays/MRI/CT scans? Yes, Hip XR 7/8/21, Lumbar spine MRI 4/19/24     Have you had treatments for this problem in the past?  -Medications? Diclofenac 1 x/daily and Aleve or Tylenol prn   -Physical therapy? Nothing recently  -Injections? Yes, LUISA 4/26/24  -Surgery? Left hip arthroscopy 2/1/11    How severe is this problem today? 0-10 scale? 2/10    How severe has this problem been at WORST in the past? 0-10 scale? 9/10    Does this problem or its symptoms cause difficulty for you falling asleep or staying asleep? No    Anything else you want us to know about this problem? Today, the patient reports that the previous left hip injection on 5/30/24 worked very well for her, she has not been experiencing any groin pain. She states that she is no longer getting \"lightening bolt\" pain that radiates down to her foot, but is getting pain in the middle of her glute.           MEDICAL HISTORY  Patient Active Problem List   Diagnosis     Family history of melanoma     Acne vulgaris     Old tear of medial meniscus of right knee, unspecified tear type       Current Outpatient Medications   Medication Sig Dispense Refill     dapsone (ACZONE) 7.5 % gel Apply topically daily 90 g 1     diclofenac (VOLTAREN) 75 MG EC tablet Take 1 tablet (75 mg) by mouth 2 times daily as needed for moderate pain 60 tablet 1     diclofenac (VOLTAREN) 75 MG EC tablet Take 1 tablet (75 mg) by " mouth 2 times daily as needed for moderate pain 60 tablet 1     levonorgestrel (MIRENA) 20 MCG/24HR IUD 1 each by Intrauterine route once       methylPREDNISolone (MEDROL DOSEPAK) 4 MG tablet therapy pack Follow Package Directions 21 tablet 0     spironolactone (ALDACTONE) 100 MG tablet Take 2 tablets (200 mg) by mouth daily 180 tablet 3     tiZANidine (ZANAFLEX) 4 MG tablet Take 1 tablet (4 mg) by mouth nightly as needed for muscle spasms 30 tablet 1     tiZANidine (ZANAFLEX) 4 MG tablet Take 1 tablet (4 mg) by mouth nightly as needed for muscle spasms 30 tablet 0     tretinoin (RETIN-A) 0.025 % external cream Use every night 20 g 1     tretinoin (RETIN-A) 0.05 % external cream Apply topically at bedtime 20 g 1       Allergies   Allergen Reactions     Contrast Dye Other (See Comments)     Notes thyroid swelling after a contrast injection for a lumbar LUISA       Family History   Problem Relation Age of Onset     Cancer Mother         melanoma     Melanoma Mother      Cancer Other         Maternal aunt has a hx of melanoma     Melanoma Other         maternal aunt     Skin Cancer No family hx of      Social History     Socioeconomic History     Marital status: Single   Tobacco Use     Smoking status: Never     Smokeless tobacco: Never       Additional medical/Social/Surgical histories reviewed in [facility] and updated as appropriate.     REVIEW OF SYSTEMS (10/1/2024)  10 point ROS of systems including Constitutional, Eyes, Respiratory, Cardiovascular, Gastroenterology, Genitourinary, Integumentary, Musculoskeletal, Psychiatric, Allergic/Immunologic were all negative except for pertinent positives noted in my HPI.     PHYSICAL EXAM  VSS    General  - normal appearance, in no obvious distress  HEENT  - conjunctivae not injected, moist mucous membranes, normocephalic/atraumatic head, ears normal appearance, no lesions, mouth normal appearance, no scars, normal dentition and teeth present  CV  - normal femoral pulse  Pulm  -  normal respiratory pattern, non-labored  Musculoskeletal - left hip  - stance: gait slightly favors affected side, no obvious leg length discrepancy, normal heel and toe walk  - inspection: no swelling or effusion,  normal bone and joint alignment, no obvious deformity  - palpation: no lateral or anterior hip tenderness, except over posterior hip and gluteal muscles  - ROM:some limited flexion and internal rotation secondary to pain, pain with passive and active ROM   - strength: 5/5 in all planes  - special tests:  (-) PATRICIA  (-) FADIR  no pain with axial femoral load  Neuro  - no sensory or motor deficit, grossly normal coordination, normal muscle tone  Skin  - no ecchymosis, erythema, warmth, or induration, no obvious rash  Psych  - interactive, appropriate, normal mood and affect  Lumbar : has some pain with extension, positive SLR On left  ASSESSMENT & PLAN  38 yo female with lumbar ddd, disc herniations,radiculopathy, worse, and left hip arthritis    I independently reviewed the following imaging studies:  Lumbar MRI shows ddd, disc herniations  Discussed can consider LUISA  Will see hip surgeon and discuss options for left hip treatment  Consider lumbar MRI as her symptoms have worsened this summer  Rx refilled for voltaren       Appropriate PPE was utilized for prevention of spread of Covid-19.  Nii Gomez MD, CAQSM        Again, thank you for allowing me to participate in the care of your patient.      Sincerely,    Nii Gomez MD

## 2024-10-03 ENCOUNTER — OFFICE VISIT (OUTPATIENT)
Dept: ORTHOPEDICS | Facility: CLINIC | Age: 39
End: 2024-10-03
Payer: COMMERCIAL

## 2024-10-03 ENCOUNTER — ANCILLARY PROCEDURE (OUTPATIENT)
Dept: GENERAL RADIOLOGY | Facility: CLINIC | Age: 39
End: 2024-10-03
Attending: ORTHOPAEDIC SURGERY
Payer: COMMERCIAL

## 2024-10-03 VITALS — HEIGHT: 67 IN | WEIGHT: 170 LBS | BODY MASS INDEX: 26.68 KG/M2

## 2024-10-03 DIAGNOSIS — M16.32 OSTEOARTHRITIS OF LEFT HIP JOINT DUE TO DYSPLASIA: ICD-10-CM

## 2024-10-03 DIAGNOSIS — M16.32 OSTEOARTHRITIS OF LEFT HIP JOINT DUE TO DYSPLASIA: Primary | ICD-10-CM

## 2024-10-03 PROCEDURE — 99213 OFFICE O/P EST LOW 20 MIN: CPT | Performed by: ORTHOPAEDIC SURGERY

## 2024-10-03 PROCEDURE — 73502 X-RAY EXAM HIP UNI 2-3 VIEWS: CPT | Mod: LT | Performed by: RADIOLOGY

## 2024-10-03 ASSESSMENT — HOOS JR
SITTING: MODERATE
LYING IN BED (TURNING OVER, MAINTAINING HIP POSITION): MILD
WALKING ON UNEVEN SURFACE: MILD
HOOS JR TOTAL INTERVAL SCORE: 70.43
BENDING TO THE FLOOR TO PICK UP OBJECT: MODERATE

## 2024-10-03 NOTE — NURSING NOTE
"Reason For Visit:   Chief Complaint   Patient presents with    RECHECK     Recheck left hip. LOV 1 year ago. Has been getting injections with Dr Gomez. Last was May 30 and it's still helping. Currently limping from a new pain since July. Seeing Dr Gomez for sciatica also. Went to [place] in June. Wants to know if this is a new hip issue. Pain is mostly now in the glute       Ht 1.702 m (5' 7\")   Wt 77.1 kg (170 lb)   BMI 26.63 kg/m      Pain Assessment  Patient Currently in Pain: Yes  0-10 Pain Scale: 2    Elizabeth Moyer, ATC    "

## 2024-10-03 NOTE — LETTER
10/3/2024      Quiana Shepherd  [address] [facility] [address]      Dear Colleague,    Thank you for referring your patient, Quiana Shepherd, to the [facility] [place]. Please see a copy of my visit note below.    Chief Complaint: RECHECK (Recheck left hip. LOV 1 year ago. Has been getting injections with Dr Gomez. Last was May 30 and it's still helping. Currently limping from a new pain since July. Seeing Dr Gomez for sciatica also. Went to [place] in June. Wants to know if this is a new hip issue. Pain is mostly now in the glute)       HPI: Quiana Shepherd returns today in follow-up for her arthritic left hip.  At her last visit 12 months ago we discussed total hip.  She wanted to take some time to think about this.  She returns today with a list of questions to discuss.  She also has questions about pain in her low back and buttock.  She wants to know whether or not this might improve with total hip.      Medications and allergies are documented in the EMR and have been reviewed.    Current Outpatient Medications:      dapsone (ACZONE) 7.5 % gel, Apply topically daily, Disp: 90 g, Rfl: 1     diclofenac (VOLTAREN) 75 MG EC tablet, Take 1 tablet (75 mg) by mouth 2 times daily as needed for moderate pain., Disp: 180 tablet, Rfl: 1     diclofenac (VOLTAREN) 75 MG EC tablet, Take 1 tablet (75 mg) by mouth 2 times daily as needed for moderate pain, Disp: 60 tablet, Rfl: 1     diclofenac (VOLTAREN) 75 MG EC tablet, Take 1 tablet (75 mg) by mouth 2 times daily as needed for moderate pain, Disp: 60 tablet, Rfl: 1     levonorgestrel (MIRENA) 20 MCG/24HR IUD, 1 each by Intrauterine route once, Disp: , Rfl:      methylPREDNISolone (MEDROL DOSEPAK) 4 MG tablet therapy pack, Follow Package Directions, Disp: 21 tablet, Rfl: 0     spironolactone (ALDACTONE) 100 MG tablet, Take 2 tablets (200 mg) by mouth daily, Disp: 180 tablet, Rfl: 3     tiZANidine (ZANAFLEX) 4 MG tablet, Take 1 tablet (4  "mg) by mouth nightly as needed for muscle spasms, Disp: 30 tablet, Rfl: 1     tiZANidine (ZANAFLEX) 4 MG tablet, Take 1 tablet (4 mg) by mouth nightly as needed for muscle spasms, Disp: 30 tablet, Rfl: 0     tretinoin (RETIN-A) 0.025 % external cream, Use every night, Disp: 20 g, Rfl: 1     tretinoin (RETIN-A) 0.05 % external cream, Apply topically at bedtime, Disp: 20 g, Rfl: 1    Current Facility-Administered Medications:      lidocaine (PF) (XYLOCAINE) 1 % injection 4 mL, 4 mL, , , , 4 mL at 05/30/24 1633     lidocaine (PF) (XYLOCAINE) 1 % injection 4 mL, 4 mL, , , Nii Gomez MD, 4 mL at 01/05/24 1332     lidocaine (PF) (XYLOCAINE) 1 % injection 4 mL, 4 mL, , , Nii Gomez MD, 4 mL at 10/10/23 1351     triamcinolone (KENALOG-40) injection 40 mg, 40 mg, , , , 40 mg at 05/30/24 1633     triamcinolone (KENALOG-40) injection 40 mg, 40 mg, , , Nii Gomez MD, 40 mg at 01/05/24 1332     triamcinolone (KENALOG-40) injection 40 mg, 40 mg, , , Nii Gomez MD, 40 mg at 10/10/23 1351  Allergies: Contrast dye    Physical Exam:  On physical examination the patient appears the stated age, is in no acute distress, affect is appropriate, and breathing is non-labored.  Ht 1.702 m (5' 7\")   Wt 77.1 kg (170 lb)   BMI 26.63 kg/m    Body mass index is 26.63 kg/m .  Gait: Antalgic, moderate, with less time on the left side.  She has 90 degrees of flexion in the hip.  This produces groin pain with any internal rotation in this position.  She also has symptoms of external rotation and flexion.  Examination of the hip today reproduces groin pain only.  Distally, the circulatory, motor, and sensation exam is intact with 5/5 EHL, gastroc-soleus, and tibialis anterior.  Sensation to light touch is intact.  Dorsalis pedis and posterior tibialis pulses are palpable.  There are no sores on the feet, no bruising, and no lymphedema.    X-rays:    I reviewed the x-rays dated today.  Previous films " reviewed.    Findings:  Normal progression for a total hip arthroplasty without evidence of loosening or subsidence.    Assessment: This is a 39-year-old female with advanced left hip osteoarthritis associated with mild acetabular dysplasia.  Her symptoms are severe.  She has had extensive nonoperative management.  This include physical therapy, activity modification, oral pain medication, and multiple intra-articular injections.  Her physical examination of the hip reproduces her groin pain.  It does not seem to reproduce any low back or buttock pain.  This may be also the outcome she work would encounter should she undergo total hip.  We again discussed the risks and benefits of the surgery.  We reviewed templated radiographs as part of teaching.  We went through her entire list of questions.  I did my very best to answer all of these. We discussed that given the level of symptoms and radiographic changes that further non-operative management in the form injection and/or physical therapist directed exercises is not mandatory. We spent twenty minutes discussing total hip arthroplasty.  We discussed the implants, the procedure, the risks and benefits, and the post-operative course.  We discussed blood clots, blood clots to the lungs, injury to blood vessels and nerves, dislocation, infection, and leg length difference.  We discussed that as part of the routine part of surgical care a qualified assist may finish closing the wound after I have left the room. All the patients questions were answered to the best of my ability.    Plan: Left total hip when the patient chooses to go forward with a    No ref. provider found      Again, thank you for allowing me to participate in the care of your patient.        Sincerely,        Jr Mohamud MD

## 2024-10-07 ENCOUNTER — TELEPHONE (OUTPATIENT)
Dept: ORTHOPEDICS | Facility: CLINIC | Age: 39
End: 2024-10-07
Payer: COMMERCIAL

## 2024-10-07 NOTE — TELEPHONE ENCOUNTER
Left Voicemail (1st Attempt) and Sent Mychart (1st Attempt) for the patient to call back and schedule the following:    Appointment type: Return MSK  Provider:   Return date: After MRI is scheduled  Additional appointment(s) needed: MRI

## 2024-10-08 NOTE — PROGRESS NOTES
Chief Complaint: RECHECK (Recheck left hip. LOV 1 year ago. Has been getting injections with Dr Gomez. Last was May 30 and it's still helping. Currently limping from a new pain since July. Seeing Dr Gomez for sciatica also. Went to [place] in June. Wants to know if this is a new hip issue. Pain is mostly now in the glute)       HPI: Quiana Shepherd returns today in follow-up for her arthritic left hip.  At her last visit 12 months ago we discussed total hip.  She wanted to take some time to think about this.  She returns today with a list of questions to discuss.  She also has questions about pain in her low back and buttock.  She wants to know whether or not this might improve with total hip.      Medications and allergies are documented in the EMR and have been reviewed.    Current Outpatient Medications:     dapsone (ACZONE) 7.5 % gel, Apply topically daily, Disp: 90 g, Rfl: 1    diclofenac (VOLTAREN) 75 MG EC tablet, Take 1 tablet (75 mg) by mouth 2 times daily as needed for moderate pain., Disp: 180 tablet, Rfl: 1    diclofenac (VOLTAREN) 75 MG EC tablet, Take 1 tablet (75 mg) by mouth 2 times daily as needed for moderate pain, Disp: 60 tablet, Rfl: 1    diclofenac (VOLTAREN) 75 MG EC tablet, Take 1 tablet (75 mg) by mouth 2 times daily as needed for moderate pain, Disp: 60 tablet, Rfl: 1    levonorgestrel (MIRENA) 20 MCG/24HR IUD, 1 each by Intrauterine route once, Disp: , Rfl:     methylPREDNISolone (MEDROL DOSEPAK) 4 MG tablet therapy pack, Follow Package Directions, Disp: 21 tablet, Rfl: 0    spironolactone (ALDACTONE) 100 MG tablet, Take 2 tablets (200 mg) by mouth daily, Disp: 180 tablet, Rfl: 3    tiZANidine (ZANAFLEX) 4 MG tablet, Take 1 tablet (4 mg) by mouth nightly as needed for muscle spasms, Disp: 30 tablet, Rfl: 1    tiZANidine (ZANAFLEX) 4 MG tablet, Take 1 tablet (4 mg) by mouth nightly as needed for muscle spasms, Disp: 30 tablet, Rfl: 0    tretinoin (RETIN-A) 0.025 % external cream, Use every  "night, Disp: 20 g, Rfl: 1    tretinoin (RETIN-A) 0.05 % external cream, Apply topically at bedtime, Disp: 20 g, Rfl: 1    Current Facility-Administered Medications:     lidocaine (PF) (XYLOCAINE) 1 % injection 4 mL, 4 mL, , , , 4 mL at 05/30/24 1633    lidocaine (PF) (XYLOCAINE) 1 % injection 4 mL, 4 mL, , , Nii Gomez MD, 4 mL at 01/05/24 1332    lidocaine (PF) (XYLOCAINE) 1 % injection 4 mL, 4 mL, , , Nii Gomez MD, 4 mL at 10/10/23 1351    triamcinolone (KENALOG-40) injection 40 mg, 40 mg, , , , 40 mg at 05/30/24 1633    triamcinolone (KENALOG-40) injection 40 mg, 40 mg, , , Nii Gomez MD, 40 mg at 01/05/24 1332    triamcinolone (KENALOG-40) injection 40 mg, 40 mg, , , Nii Gomez MD, 40 mg at 10/10/23 1351  Allergies: Contrast dye    Physical Exam:  On physical examination the patient appears the stated age, is in no acute distress, affect is appropriate, and breathing is non-labored.  Ht 1.702 m (5' 7\")   Wt 77.1 kg (170 lb)   BMI 26.63 kg/m    Body mass index is 26.63 kg/m .  Gait: Antalgic, moderate, with less time on the left side.  She has 90 degrees of flexion in the hip.  This produces groin pain with any internal rotation in this position.  She also has symptoms of external rotation and flexion.  Examination of the hip today reproduces groin pain only.  Distally, the circulatory, motor, and sensation exam is intact with 5/5 EHL, gastroc-soleus, and tibialis anterior.  Sensation to light touch is intact.  Dorsalis pedis and posterior tibialis pulses are palpable.  There are no sores on the feet, no bruising, and no lymphedema.    X-rays:    I reviewed the x-rays dated today.  Previous films reviewed.    Findings:  Normal progression for a total hip arthroplasty without evidence of loosening or subsidence.    Assessment: This is a 39-year-old female with advanced left hip osteoarthritis associated with mild acetabular dysplasia.  Her symptoms are severe.  " She has had extensive nonoperative management.  This include physical therapy, activity modification, oral pain medication, and multiple intra-articular injections.  Her physical examination of the hip reproduces her groin pain.  It does not seem to reproduce any low back or buttock pain.  This may be also the outcome she work would encounter should she undergo total hip.  We again discussed the risks and benefits of the surgery.  We reviewed templated radiographs as part of teaching.  We went through her entire list of questions.  I did my very best to answer all of these. We discussed that given the level of symptoms and radiographic changes that further non-operative management in the form injection and/or physical therapist directed exercises is not mandatory. We spent twenty minutes discussing total hip arthroplasty.  We discussed the implants, the procedure, the risks and benefits, and the post-operative course.  We discussed blood clots, blood clots to the lungs, injury to blood vessels and nerves, dislocation, infection, and leg length difference.  We discussed that as part of the routine part of surgical care a qualified assist may finish closing the wound after I have left the room. All the patients questions were answered to the best of my ability.    Plan: Left total hip when the patient chooses to go forward with a    No ref. provider found

## 2024-10-09 NOTE — TELEPHONE ENCOUNTER
Left Voicemail (2nd Attempt) and Sent Mychart (2nd Attempt) for the patient to call back and schedule the following:    Appointment type: Return Med Spine  Provider: Dr. Gomez  Return date: after 10/24/24

## 2024-10-11 ASSESSMENT — ACTIVITIES OF DAILY LIVING (ADL)
WALKING_DOWN_STEEP_HILLS: SLIGHT DIFFICULTY
SPORTS_HIGHEST_POTENTIAL_SCORE: 36
WALKING_UP_STEEP_HILLS: MODERATE DIFFICULTY
WALKING_APPROXIMATELY_10_MINUTES: MODERATE DIFFICULTY
HOS_ADL_ITEM_SCORE_TOTAL: 45
GOING UP 1 FLIGHT OF STAIRS: SLIGHT DIFFICULTY
CUTTING/LATERAL_MOVEMENTS: MODERATE DIFFICULTY
SPORTS_TOTAL_ITEM_SCORE: 0
ABILITY_TO_PERFORM_ACTIVITY_WITH_YOUR_NORMAL_TECHNIQUE: MODERATE DIFFICULTY
GETTING_INTO_AND_OUT_OF_AN_AVERAGE_CAR: SLIGHT DIFFICULTY
ADL_TOTAL_ITEM_SCORE: 0
JUMPING: SLIGHT DIFFICULTY
WALKING_INITIALLY: SLIGHT DIFFICULTY
WALKING_UP_STEEP_HILLS: MODERATE DIFFICULTY
STARTING_AND_STOPPING_QUICKLY: MODERATE DIFFICULTY
HEAVY_WORK: MODERATE DIFFICULTY
GOING DOWN 1 FLIGHT OF STAIRS: NO DIFFICULTY AT ALL
LIGHT_TO_MODERATE_WORK: MODERATE DIFFICULTY
LOW_IMPACT_ACTIVITIES_LIKE_FAST_WALKING: EXTREME DIFFICULTY
SITTING FOR 15 MINUTES: SLIGHT DIFFICULTY
WALKING_FOR_APPROXIMATELY_10_MINUTES: MODERATE DIFFICULTY
TWISTING/PIVOTING ON INVOLVED LEG: NO DIFFICULTY AT ALL
HOW_WOULD_YOU_RATE_YOUR_CURRENT_LEVEL_OF_FUNCTION_DURING_YOUR_USUAL_ACTIVITIES_OF_DAILY_LIVING_FROM_0_TO_100_WITH_100_BEING_YOUR_LEVEL_OF_FUNCTION_PRIOR_TO_YOUR_HIP_PROBLEM_AND_0_BEING_THE_INABILITY_TO_PERFORM_ANY_OF_YOUR_USUAL_DAILY_ACTIVITIES?: 60
PUTTING ON SOCKS AND SHOES: NO DIFFICULTY AT ALL
WALKING_15_MINUTES_OR_GREATER: EXTREME DIFFICULTY
LIGHT_TO_MODERATE_WORK: MODERATE DIFFICULTY
WALKING_15_MINUTES_OR_GREATER: EXTREME DIFFICULTY
ADL_COUNT: 17
HOS_ADL_SCORE(%): 66.18
GETTING INTO AND OUT OF AN AVERAGE CAR: SLIGHT DIFFICULTY
ROLLING OVER IN BED: NO DIFFICULTY AT ALL
PUTTING_ON_SOCKS_AND_SHOES: NO DIFFICULTY AT ALL
PLEASE_INDICATE_YOR_PRIMARY_REASON_FOR_REFERRAL_TO_THERAPY:: HIP
SPORTS_COUNT: 9
HOW_WOULD_YOU_RATE_YOUR_CURRENT_LEVEL_OF_FUNCTION_DURING_YOUR_USUAL_ACTIVITIES_OF_DAILY_LIVING_FROM_0_TO_100_WITH_100_BEING_YOUR_LEVEL_OF_FUNCTION_PRIOR_TO_YOUR_HIP_PROBLEM_AND_0_BEING_THE_INABILITY_TO_PERFORM_ANY_OF_YOUR_USUAL_DAILY_ACTIVITIES?: 60
GETTING_INTO_AND_OUT_OF_A_BATHTUB: SLIGHT DIFFICULTY
DEEP SQUATTING: EXTREME DIFFICULTY
LANDING: SLIGHT DIFFICULTY
STANDING FOR 15 MINUTES: SLIGHT DIFFICULTY
STEPPING UP AND DOWN CURBS: SLIGHT DIFFICULTY
RECREATIONAL ACTIVITIES: MODERATE DIFFICULTY
WALKING_INITIALLY: SLIGHT DIFFICULTY
HOS_ADL_HIGHEST_POTENTIAL_SCORE: 68
RECREATIONAL_ACTIVITIES: MODERATE DIFFICULTY
HEAVY_WORK: MODERATE DIFFICULTY
ADL_SCORE(%): 0
RUNNING_ONE_MILE: UNABLE TO DO
SITTING_FOR_15_MINUTES: SLIGHT DIFFICULTY
ADL_HIGHEST_POTENTIAL_SCORE: 68
TWISTING/PIVOTING_ON_INVOLVED_LEG: NO DIFFICULTY AT ALL
SWINGING_OBJECTS_LIKE_A_GOLF_CLUB: SLIGHT DIFFICULTY
GETTING_INTO_AND_OUT_OF_A_BATHTUB: SLIGHT DIFFICULTY
HOW_WOULD_YOU_RATE_YOUR_CURRENT_LEVEL_OF_FUNCTION?: ABNORMAL
GOING_DOWN_1_FLIGHT_OF_STAIRS: NO DIFFICULTY AT ALL
GOING_UP_1_FLIGHT_OF_STAIRS: SLIGHT DIFFICULTY
STANDING_FOR_15_MINUTES: SLIGHT DIFFICULTY
WALKING_DOWN_STEEP_HILLS: SLIGHT DIFFICULTY
ROLLING_OVER_IN_BED: NO DIFFICULTY AT ALL
DEEP_SQUATTING: EXTREME DIFFICULTY
ABILITY_TO_PARTICIPATE_IN_YOUR_DESIRED_SPORT_AS_LONG_AS_YOU_WOULD_LIKE: MODERATE DIFFICULTY
SPORTS_SCORE(%): 0
HOW_WOULD_YOU_RATE_YOUR_CURRENT_LEVEL_OF_FUNCTION_DURING_YOUR_SPORTS_RELATED_ACTIVITIES_FROM_0_TO_100_WITH_100_BEING_YOUR_LEVEL_OF_FUNCTION_PRIOR_TO_YOUR_HIP_PROBLEM_AND_0_BEING_THE_INABILITY_TO_PERFORM_ANY_OF_YOUR_USUAL_DAILY_ACTIVITIES?: 50
STEPPING_UP_AND_DOWN_CURBS: SLIGHT DIFFICULTY

## 2024-10-14 ENCOUNTER — THERAPY VISIT (OUTPATIENT)
Dept: PHYSICAL THERAPY | Facility: CLINIC | Age: 39
End: 2024-10-14
Payer: COMMERCIAL

## 2024-10-14 ENCOUNTER — TELEPHONE (OUTPATIENT)
Dept: ORTHOPEDICS | Facility: CLINIC | Age: 39
End: 2024-10-14

## 2024-10-14 DIAGNOSIS — M16.12 ARTHRITIS OF LEFT HIP: ICD-10-CM

## 2024-10-14 DIAGNOSIS — M54.42 CHRONIC LEFT-SIDED LOW BACK PAIN WITH LEFT-SIDED SCIATICA: Primary | ICD-10-CM

## 2024-10-14 DIAGNOSIS — G89.29 CHRONIC LEFT-SIDED LOW BACK PAIN WITH LEFT-SIDED SCIATICA: Primary | ICD-10-CM

## 2024-10-14 DIAGNOSIS — M54.16 LUMBAR RADICULAR PAIN: ICD-10-CM

## 2024-10-14 PROCEDURE — 97530 THERAPEUTIC ACTIVITIES: CPT | Mod: GP | Performed by: PHYSICAL THERAPIST

## 2024-10-14 PROCEDURE — 97161 PT EVAL LOW COMPLEX 20 MIN: CPT | Mod: GP | Performed by: PHYSICAL THERAPIST

## 2024-10-14 NOTE — PROGRESS NOTES
PHYSICAL THERAPY EVALUATION  Type of Visit: Evaluation        Therapist Impression:   Quiana presents with L hip OA as well as low back pain that seems to respond to extension directional preference    NEXT: Core stability, gym program    PTRX: NA    GOALS: lifting/carrying, walking without a cane, gym program          Subjective  2020, symptoms started to worsen.  Hx of injection into SI join      Presenting condition or subjective complaint: Prep for THR and lessen SI joint pain and sciatica.  Date of onset: 07/01/24    Relevant medical history: Osteoarthritis   Dates & types of surgery: Cervical halo - 1993, hip arthroscopy - 2010    Prior diagnostic imaging/testing results: MRI; X-ray     Prior therapy history for the same diagnosis, illness or injury: Yes Recovery after hip arthroscopy in 2010, as well as periodic PT since then to try to mitigate hip OA since then.    Prior Level of Function  Transfers: Independent  Ambulation: Independent  ADL: Independent  IADL:     Living Environment  Social support: With a significant other or spouse   Type of home: House   Stairs to enter the home: Yes 3 Is there a railing: Yes     Ramp: No   Stairs inside the home: Yes 27 Is there a railing: Yes     Help at home: Home and Yard maintenance tasks  Equipment owned: Straight Cane; Crutches     Employment: Yes   Hobbies/Interests: Cycling, walking, dancing, cooking, traveling    Patient goals for therapy: Balance on my left leg, walk without a cane, lift things without hurting myself    Pain assessment: Pain present into L glute     Objective     Dynamic Movement Screen  Single leg stance observations: Eyes open no significant findings   Double limb squat observations: Good technique/no significant findings  Single limb squat observations: Not assessed    Hip Joint Screen limited IR and abduction L    Trunk Range of Motion  Movement Loss Major Moderate Minimal Nil Pain   Flexion    x    Extension    x     Sidebending R    x    Sidebending L    x    Side Gliding R        Side Gliding L        Thoracic Rotation R        Thoracic Rotation L        REIL worse    Flexibility Left Right   Quadriceps     Hamstrings trivial none/WNL   Figure 4            Hip and Knee Strength   MMT Left Right   Hip Abduction 5-/5 na/5   Hip Extension na/5 na/5   Hip ER 5p/5 na/5          Special Tests  Spring testing: Negative  Neural tension: Positive 58 deg SLR, 25 post repeated flexion in sitting and 62 after REIL.  SI joint test: Negative    Palpation:  No tenderness to palpation      Assessment & Plan   CLINICAL IMPRESSIONS  Medical Diagnosis: LBP/L hip OA    Treatment Diagnosis: LBP/L hip OA   Impression/Assessment: Patient is a 39 year old female with LBP/L hip OA complaints.  The following significant findings have been identified: Pain, Decreased ROM/flexibility, Decreased strength, Impaired muscle performance, Decreased activity tolerance, and Impaired posture. These impairments interfere with their ability to perform self care tasks, work tasks, recreational activities, and household chores as compared to previous level of function.     Clinical Decision Making (Complexity):  Clinical Presentation: Stable/Uncomplicated  Clinical Presentation Rationale: based on medical and personal factors listed in PT evaluation  Clinical Decision Making (Complexity): Low complexity    PLAN OF CARE  Treatment Interventions:  Modalities: Dry Needling  Interventions: Manual Therapy, Neuromuscular Re-education, Therapeutic Activity, Therapeutic Exercise, Self-Care/Home Management    Long Term Goals     PT Goal 1  Goal Identifier: Walking  Goal Description: Be able to walk without a cane  Rationale: to maximize safety and independence with performance of ADLs and functional tasks;to maximize safety and independence within the home;to maximize safety and independence within the community  Target Date: 12/09/24      Frequency of Treatment: 1 x  week  Duration of Treatment: 8 weeks    Recommended Referrals to Other Professionals:   Education Assessment:        Risks and benefits of evaluation/treatment have been explained.   Patient/Family/caregiver agrees with Plan of Care.     Evaluation Time:     PT Eval, Low Complexity Minutes (56295): 20       Signing Clinician: Femi Vela PT

## 2024-10-14 NOTE — TELEPHONE ENCOUNTER
Spoke with the patient to discuss getting surgery scheduled. She is going in for a lumbar MRI later this month and then discussing the results with Dr. Gomez. She is going to wait until after those results are back to decide anything. She would like the surgery packet mailed to her to review the information about a total hip, and then she will be able to contact us directly.     Lindsey Ricks  Surgery Scheduling Coordinator  Ph: [phone]

## 2024-10-24 ENCOUNTER — ANCILLARY PROCEDURE (OUTPATIENT)
Dept: MRI IMAGING | Facility: CLINIC | Age: 39
End: 2024-10-24
Attending: PREVENTIVE MEDICINE
Payer: COMMERCIAL

## 2024-10-24 DIAGNOSIS — M51.16 LUMBAR DISC HERNIATION WITH RADICULOPATHY: ICD-10-CM

## 2024-10-24 DIAGNOSIS — M54.16 LUMBAR RADICULAR PAIN: ICD-10-CM

## 2024-10-24 PROCEDURE — 72148 MRI LUMBAR SPINE W/O DYE: CPT

## 2024-10-30 ENCOUNTER — VIRTUAL VISIT (OUTPATIENT)
Dept: ORTHOPEDICS | Facility: CLINIC | Age: 39
End: 2024-10-30
Payer: COMMERCIAL

## 2024-10-30 ENCOUNTER — MYC MEDICAL ADVICE (OUTPATIENT)
Dept: ORTHOPEDICS | Facility: CLINIC | Age: 39
End: 2024-10-30

## 2024-10-30 ENCOUNTER — THERAPY VISIT (OUTPATIENT)
Dept: PHYSICAL THERAPY | Facility: CLINIC | Age: 39
End: 2024-10-30
Payer: COMMERCIAL

## 2024-10-30 DIAGNOSIS — M54.16 LUMBAR RADICULAR PAIN: ICD-10-CM

## 2024-10-30 DIAGNOSIS — M16.12 ARTHRITIS OF LEFT HIP: Primary | ICD-10-CM

## 2024-10-30 DIAGNOSIS — M51.16 LUMBAR DISC HERNIATION WITH RADICULOPATHY: Primary | ICD-10-CM

## 2024-10-30 DIAGNOSIS — M54.42 CHRONIC LEFT-SIDED LOW BACK PAIN WITH LEFT-SIDED SCIATICA: ICD-10-CM

## 2024-10-30 DIAGNOSIS — G89.29 CHRONIC LEFT-SIDED LOW BACK PAIN WITH LEFT-SIDED SCIATICA: ICD-10-CM

## 2024-10-30 PROCEDURE — 97530 THERAPEUTIC ACTIVITIES: CPT | Mod: GP | Performed by: PHYSICAL THERAPIST

## 2024-10-30 PROCEDURE — 97110 THERAPEUTIC EXERCISES: CPT | Mod: GP | Performed by: PHYSICAL THERAPIST

## 2024-10-30 PROCEDURE — 99442 PR PHYSICIAN TELEPHONE EVALUATION 11-20 MIN: CPT | Mod: 93 | Performed by: PREVENTIVE MEDICINE

## 2024-10-30 NOTE — LETTER
10/30/2024      Quiana Shepherd  [address] [facility] [address]      Dear Colleague,    Thank you for referring your patient, Quiana Shepherd, to the [facility] [place]. Please see a copy of my visit note below.    Patient is a   39  year old who is being evaluated via a billable telephone visit.      What phone number would you like to be contacted at? CELL  How would you like to obtain your AVS? MYCHART        Subjective   Patient is a  39   year old who presents by phone call visit for the following:     HPI   Followup for lumbar MRI  Continues to have low back pain that radiates into hips  Wants to discuss MRI and discuss options for treatment    Review of Systems   Constitutional, HEENT, cardiovascular, pulmonary, gi and gu systems are negative, except as otherwise noted.      Objective           Vitals:  No vitals were obtained today due to virtual visit.    Physical Exam   healthy, alert, and no distress  PSYCH: Alert and oriented times 3; coherent speech, normal   rate and volume, able to articulate logical thoughts, able   to abstract reason, no tangential thoughts, no hallucinations   or delusions  His affect is normal  RESP: No cough, no audible wheezing, able to talk in full sentences  Remainder of exam unable to be completed due to telephone visits    Assessment/Plan  40 yo female with lumbar ddd, disc herniations, radiculopathy, not resolved    I independently reviewed the following imaging studies and discussed with patient:  Lumbar MRI shows ddd, disc herniations  Had over 50% improvement from LUISA last April for more than 3 months  Will order another Epidural injection  Followup after          Phone call duration: 20 minutes  Phone call start: 100pm  Phone call end: 120pm  Dr Gomez      Again, thank you for allowing me to participate in the care of your patient.        Sincerely,        Nii Gomez MD

## 2024-10-30 NOTE — PROGRESS NOTES
Therapist Impression:   Progressed intensity of press ups.  Discussed precautions s/p RADHA and prognosis/POC.  Continue core work with home core training program.    NEXT: Core stability, gym program    PTRX: NA    GOALS: lifting/carrying, walking without a cane, gym program    Subjective:  Pain is decreased in the back.  Hip pain is about the same.  Down stairs is bothersome.    Objective:  REIL - improved symptoms    Hip and Knee Strength   MMT Left Right   Hip Abduction 24/5 27/5   Hip Extension 30/5 34/5   Hip ER /5 na/5

## 2024-10-30 NOTE — PROGRESS NOTES
Patient is a   39  year old who is being evaluated via a billable telephone visit.      What phone number would you like to be contacted at? CELL  How would you like to obtain your AVS? LYNN        Subjective   Patient is a  39   year old who presents by phone call visit for the following:     HPI   Followup for lumbar MRI  Continues to have low back pain that radiates into hips  Wants to discuss MRI and discuss options for treatment    Review of Systems   Constitutional, HEENT, cardiovascular, pulmonary, gi and gu systems are negative, except as otherwise noted.      Objective           Vitals:  No vitals were obtained today due to virtual visit.    Physical Exam   healthy, alert, and no distress  PSYCH: Alert and oriented times 3; coherent speech, normal   rate and volume, able to articulate logical thoughts, able   to abstract reason, no tangential thoughts, no hallucinations   or delusions  His affect is normal  RESP: No cough, no audible wheezing, able to talk in full sentences  Remainder of exam unable to be completed due to telephone visits    Assessment/Plan  38 yo female with lumbar ddd, disc herniations, radiculopathy, not resolved    I independently reviewed the following imaging studies and discussed with patient:  Lumbar MRI shows ddd, disc herniations  Had over 50% improvement from LUISA last April for more than 3 months  Will order another Epidural injection  Followup after          Phone call duration: 20 minutes  Phone call start: 100pm  Phone call end: 120pm  Dr Gomez

## 2024-10-30 NOTE — LETTER
10/30/2024      Quiana Shepherd  [address] [facility] [address]      Dear Colleague,    Thank you for referring your patient, Quiana Shepherd, to the [facility] [place]. Please see a copy of my visit note below.    Patient is a   39  year old who is being evaluated via a billable telephone visit.      What phone number would you like to be contacted at? CELL  How would you like to obtain your AVS? MYCHART        Subjective   Patient is a  39   year old who presents by phone call visit for the following:     HPI   Followup for lumbar MRI  Continues to have low back pain that radiates into hips  Wants to discuss MRI and discuss options for treatment    Review of Systems   Constitutional, HEENT, cardiovascular, pulmonary, gi and gu systems are negative, except as otherwise noted.      Objective           Vitals:  No vitals were obtained today due to virtual visit.    Physical Exam   healthy, alert, and no distress  PSYCH: Alert and oriented times 3; coherent speech, normal   rate and volume, able to articulate logical thoughts, able   to abstract reason, no tangential thoughts, no hallucinations   or delusions  His affect is normal  RESP: No cough, no audible wheezing, able to talk in full sentences  Remainder of exam unable to be completed due to telephone visits    Assessment/Plan  38 yo female with lumbar ddd, disc herniations, radiculopathy, not resolved    I independently reviewed the following imaging studies and discussed with patient:  Lumbar MRI shows ddd, disc herniations  Had over 50% improvement from LUISA last April for more than 3 months  Will order another Epidural injection  Followup after          Phone call duration: 20 minutes  Phone call start: 100pm  Phone call end: 120pm  Dr Gomez      Again, thank you for allowing me to participate in the care of your patient.        Sincerely,        Nii Gomez MD

## 2024-11-15 ENCOUNTER — OFFICE VISIT (OUTPATIENT)
Dept: DERMATOLOGY | Facility: CLINIC | Age: 39
End: 2024-11-15
Payer: COMMERCIAL

## 2024-11-15 ENCOUNTER — THERAPY VISIT (OUTPATIENT)
Dept: PHYSICAL THERAPY | Facility: CLINIC | Age: 39
End: 2024-11-15
Payer: COMMERCIAL

## 2024-11-15 VITALS — HEART RATE: 60 BPM | OXYGEN SATURATION: 98 % | DIASTOLIC BLOOD PRESSURE: 86 MMHG | SYSTOLIC BLOOD PRESSURE: 132 MMHG

## 2024-11-15 DIAGNOSIS — M54.16 LUMBAR RADICULAR PAIN: ICD-10-CM

## 2024-11-15 DIAGNOSIS — D22.9 MULTIPLE BENIGN NEVI: ICD-10-CM

## 2024-11-15 DIAGNOSIS — M16.12 ARTHRITIS OF LEFT HIP: Primary | ICD-10-CM

## 2024-11-15 DIAGNOSIS — L81.4 LENTIGINES: ICD-10-CM

## 2024-11-15 DIAGNOSIS — D18.01 CHERRY ANGIOMA: ICD-10-CM

## 2024-11-15 DIAGNOSIS — L70.0 ACNE VULGARIS: ICD-10-CM

## 2024-11-15 DIAGNOSIS — M54.42 CHRONIC LEFT-SIDED LOW BACK PAIN WITH LEFT-SIDED SCIATICA: ICD-10-CM

## 2024-11-15 DIAGNOSIS — L68.0 HIRSUTISM: Primary | ICD-10-CM

## 2024-11-15 DIAGNOSIS — L68.9 EXCESSIVE HAIR GROWTH: ICD-10-CM

## 2024-11-15 DIAGNOSIS — G89.29 CHRONIC LEFT-SIDED LOW BACK PAIN WITH LEFT-SIDED SCIATICA: ICD-10-CM

## 2024-11-15 DIAGNOSIS — Z80.8 FAMILY HISTORY OF MELANOMA: ICD-10-CM

## 2024-11-15 DIAGNOSIS — Z87.898 HISTORY OF ATYPICAL NEVUS: ICD-10-CM

## 2024-11-15 PROCEDURE — 97110 THERAPEUTIC EXERCISES: CPT | Mod: GP | Performed by: PHYSICAL THERAPIST

## 2024-11-15 PROCEDURE — 97530 THERAPEUTIC ACTIVITIES: CPT | Mod: GP | Performed by: PHYSICAL THERAPIST

## 2024-11-15 RX ORDER — TRETINOIN 0.25 MG/G
CREAM TOPICAL
Qty: 20 G | Refills: 1 | Status: SHIPPED | OUTPATIENT
Start: 2024-11-15

## 2024-11-15 RX ORDER — TRETINOIN 0.5 MG/G
CREAM TOPICAL AT BEDTIME
Qty: 20 G | Refills: 1 | Status: SHIPPED | OUTPATIENT
Start: 2024-11-15

## 2024-11-15 RX ORDER — DAPSONE 75 MG/G
GEL TOPICAL DAILY
Qty: 90 G | Refills: 1 | Status: SHIPPED | OUTPATIENT
Start: 2024-11-15

## 2024-11-15 RX ORDER — SPIRONOLACTONE 100 MG/1
200 TABLET, FILM COATED ORAL DAILY
Qty: 180 TABLET | Refills: 3 | Status: SHIPPED | OUTPATIENT
Start: 2024-11-15

## 2024-11-15 ASSESSMENT — PAIN SCALES - GENERAL: PAINLEVEL_OUTOF10: NO PAIN (0)

## 2024-11-15 NOTE — PROGRESS NOTES
Therapist Impression:   Findings today suggestive of piriformis pain.  Focus on glute strengthening and stretching    NEXT: Core stability, gym program    PTRX: NA    GOALS: lifting/carrying, walking without a cane, gym program    Subjective:  Pain maybe has decreased a little bit.  Introducing more exercises are bringing up other pain.  Hip flexor/psoas, hip region is sore, but not sure if hip joint is involved.  Hinging at hip bothers.    Objective:  Difficulty with SLR abduction and firehydrant motion indicating hip strength impairments.  TTP along piriformis.  Positive SLR on L that improved after brief figure 4 stretch.

## 2024-11-15 NOTE — PROGRESS NOTES
[facility] Dermatology Note  Encounter Date: Nov 15, 2024  Office Visit      Dermatology Problem List:  Last TBSE 11/15/24    1. Acne vulgaris with hormonal component   - Current tx: tretinoin 0.05% cream at bedtime alternating with tretinoin 0.025% cream, spironolactone 200 mg daily. Aczone 7.5% gel  - Previous tx: spironolactone 100 mg, spironolactone 150 mg daily  - Future Consideration: hormone testing (mother went through menopause at 41 y/o)  2. Hirsutism - spironolactone, vaniqua (gets in [place])  3. Hx dysplastic nevi  - R superior breast - mild atypia - excised with bx  - R medial thigh - mild to moderate atypia - excised with bx    FHx: Melanoma (mother).  SHx: Works remotely.  ____________________________________________    Assessment & Plan:    # Hormonal acne. Acne well-controlled with current regimen.   - Continue Aczone 7.5% gel daily. Mix with moisturizer to apply if desired. Refilled today.  - Continue spironolactone 200 mg QD. Refilled today.  - Continue alternating tretinoin 0.05% cream in the winter and summer months, and tretinoin 0.025% creams in the spring and fall months. Refilled both creams today.  - Continue current contraceptive (Mirena IUD).    # Hirsutism  - suggested patient follow up with endo/obgyn for hormone testing as mother went through menopause in 40s  - Hirsutism controlled with plucking, but patient is concerned with scaring.  - For hirsutism, recommended at-home dermaplaning with facial razors which can be purchased at [employer].  - started at home IPL - worked well on her legs and underarms - not working as well on chin - likely because hairs are blonde  - can trial vaniqua - edu that this is no longer available in [place] due to lack of demand for product, but she could potentially get across border in [place]. Paper rx provided.   - continue spirolactone as above    # H/O DN.  # FHx of melanoma.  # Benign findings: multiple benign nevi, lentigines, cherry  angiomas  - edu on benign etiology  - Signs and Symptoms of non-melanoma skin cancer and ABCDEs of melanoma reviewed with patient. Patient encouraged to perform monthly self skin exams and educated on how to perform them. UV precautions reviewed with patient. Patient was asked about new or changing moles/lesions on body.   - Sunscreen: Apply 20 minutes prior to going outdoors and reapply every two hours, when wet or sweating. We recommend using an SPF 30 or higher, and to use one that is water resistant.     - RTC for changes.  - Continue annual skin exams.    Procedures Performed:   N/A    Follow-up: 1 year in-person, or earlier for new or changing lesions    Staff/Scribe:     Scribe Disclosure:   I, Ailyn Brody, am serving as a scribe to document services personally performed by Delfina Rowley PA-C based on data collection and the provider's statements to me.     All risks, benefits and alternatives were discussed with patient.  Patient is in agreement and understands the assessment and plan.  All questions were answered.    Delfina Rowley PA-C, [facility]  [facility] [place]: Phone: [phone], Fax: [phone]  [facility]: Phone: [phone],  Fax: [phone]  [facility]: Phone: [phone], Fax: [phone]  ____________________________________________    CC: No chief complaint on file.      HPI:  Ms. Quiana Shepherd is a 39 year old female who presents today as a return patient for a skin check and acne.    Patient reports acne has improved. Still taking spironolactone 200 mg QAM, along with using tretinoin regularly and AcZone. Had a mole that became crusty around her left shoulder where her bra strap rubs, but it resolved. Still gets facial hair on her chin and she has to pluck daily. She believes there is some scarring from plucking in that area. She recently returned from a trip to  [facility].    Patient is otherwise feeling well, without additional concerns.    Labs:  N/A    Physical Exam:  Vitals: There were no vitals taken for this visit.  SKIN: Total skin excluding the undergarment areas was performed. The exam included the head/face, neck, both arms, chest, back, abdomen, both legs, digits and/or nails.    - Bailey's skin type I, <100 nevi  - There are dome shaped bright red papules on the trunk.   - Multiple regular brown pigmented macules and papules are identified on the trunk and extremities.   - Scattered brown macules on sun exposed areas.    - no active acneiform papules or comedones on the face  - few hyperpigmented 1mm macules on chin  - No other lesions of concern on areas examined.     Medications:  Current Outpatient Medications   Medication Sig Dispense Refill    dapsone (ACZONE) 7.5 % gel Apply topically daily 90 g 1    diclofenac (VOLTAREN) 75 MG EC tablet Take 1 tablet (75 mg) by mouth 2 times daily as needed for moderate pain. 180 tablet 1    diclofenac (VOLTAREN) 75 MG EC tablet Take 1 tablet (75 mg) by mouth 2 times daily as needed for moderate pain 60 tablet 1    diclofenac (VOLTAREN) 75 MG EC tablet Take 1 tablet (75 mg) by mouth 2 times daily as needed for moderate pain 60 tablet 1    levonorgestrel (MIRENA) 20 MCG/24HR IUD 1 each by Intrauterine route once      methylPREDNISolone (MEDROL DOSEPAK) 4 MG tablet therapy pack Follow Package Directions 21 tablet 0    spironolactone (ALDACTONE) 100 MG tablet Take 2 tablets (200 mg) by mouth daily 180 tablet 3    tiZANidine (ZANAFLEX) 4 MG tablet Take 1 tablet (4 mg) by mouth nightly as needed for muscle spasms 30 tablet 1    tiZANidine (ZANAFLEX) 4 MG tablet Take 1 tablet (4 mg) by mouth nightly as needed for muscle spasms 30 tablet 0    tretinoin (RETIN-A) 0.025 % external cream Use every night 20 g 1    tretinoin (RETIN-A) 0.05 % external cream Apply topically at bedtime 20 g 1     Current Facility-Administered  Medications   Medication Dose Route Frequency Provider Last Rate Last Admin    lidocaine (PF) (XYLOCAINE) 1 % injection 4 mL  4 mL      4 mL at 05/30/24 1633    lidocaine (PF) (XYLOCAINE) 1 % injection 4 mL  4 mL   Nii Gomez MD   4 mL at 01/05/24 1332    lidocaine (PF) (XYLOCAINE) 1 % injection 4 mL  4 mL   Nii Gomez MD   4 mL at 10/10/23 1351    triamcinolone (KENALOG-40) injection 40 mg  40 mg      40 mg at 05/30/24 1633    triamcinolone (KENALOG-40) injection 40 mg  40 mg   Nii Gomez MD   40 mg at 01/05/24 1332    triamcinolone (KENALOG-40) injection 40 mg  40 mg   Nii Gomez MD   40 mg at 10/10/23 1351      Past Medical/Surgical History:   Patient Active Problem List   Diagnosis    Family history of melanoma    Acne vulgaris    Old tear of medial meniscus of right knee, unspecified tear type    Arthritis of left hip    Chronic left-sided low back pain with left-sided sciatica    Lumbar radicular pain     Past Medical History:   Diagnosis Date    Acne vulgaris 10/26/2017

## 2024-11-15 NOTE — NURSING NOTE
Dermatology Rooming Note    Quiana Shepherd's goals for this visit include:   Chief Complaint   Patient presents with    Skin Check     Here today for a skin check. No concerns.      Keely Qureshi RN

## 2024-11-15 NOTE — LETTER
11/15/2024       RE: Quiana Shepherd  [address] [facility] [address]     Dear Colleague,    Thank you for referring your patient, Quiana Shepherd, to the [facility] DERMATOLOGY CLINIC [place] at [facility]. Please see a copy of my visit note below.    [facility] Dermatology Note  Encounter Date: Nov 15, 2024  Office Visit      Dermatology Problem List:  Last TBSE 11/15/24    1. Acne vulgaris with hormonal component   - Current tx: tretinoin 0.05% cream at bedtime alternating with tretinoin 0.025% cream, spironolactone 200 mg daily. Aczone 7.5% gel  - Previous tx: spironolactone 100 mg, spironolactone 150 mg daily  - Future Consideration: hormone testing (mother went through menopause at 41 y/o)  2. Hirsutism - spironolactone, vaniqua (gets in [place])  3. Hx dysplastic nevi  - R superior breast - mild atypia - excised with bx  - R medial thigh - mild to moderate atypia - excised with bx    FHx: Melanoma (mother).  SHx: Works remotely.  ____________________________________________    Assessment & Plan:    # Hormonal acne. Acne well-controlled with current regimen.   - Continue Aczone 7.5% gel daily. Mix with moisturizer to apply if desired. Refilled today.  - Continue spironolactone 200 mg QD. Refilled today.  - Continue alternating tretinoin 0.05% cream in the winter and summer months, and tretinoin 0.025% creams in the spring and fall months. Refilled both creams today.  - Continue current contraceptive (Mirena IUD).    # Hirsutism  - suggested patient follow up with endo/obgyn for hormone testing as mother went through menopause in 40s  - Hirsutism controlled with plucking, but patient is concerned with scaring.  - For hirsutism, recommended at-home dermaplaning with facial razors which can be purchased at [employer].  - started at home IPL - worked well on her legs and underarms - not working as well on chin - likely because hairs  are blonde  - can trial vaniqua - edu that this is no longer available in [place] due to lack of demand for product, but she could potentially get across border in [place]. Paper rx provided.   - continue spirolactone as above    # H/O DN.  # FHx of melanoma.  # Benign findings: multiple benign nevi, lentigines, cherry angiomas  - edu on benign etiology  - Signs and Symptoms of non-melanoma skin cancer and ABCDEs of melanoma reviewed with patient. Patient encouraged to perform monthly self skin exams and educated on how to perform them. UV precautions reviewed with patient. Patient was asked about new or changing moles/lesions on body.   - Sunscreen: Apply 20 minutes prior to going outdoors and reapply every two hours, when wet or sweating. We recommend using an SPF 30 or higher, and to use one that is water resistant.     - RTC for changes.  - Continue annual skin exams.    Procedures Performed:   N/A    Follow-up: 1 year in-person, or earlier for new or changing lesions    Staff/Scribe:     Scribe Disclosure:   I, Ailyn Brody, am serving as a scribe to document services personally performed by Delfina Rowley PA-C based on data collection and the provider's statements to me.     All risks, benefits and alternatives were discussed with patient.  Patient is in agreement and understands the assessment and plan.  All questions were answered.    Delfina Rowley PA-C, [facility]  [facility]: Phone: [phone], Fax: [phone]  [facility]: Phone: [phone],  Fax: [phone]  [facility]: Phone: [phone], Fax: [phone]  ____________________________________________    CC: No chief complaint on file.      HPI:  Ms. Quiana Shepherd is a 39 year old female who presents today as a return patient for a skin check and acne.    Patient reports acne has improved. Still taking spironolactone 200 mg QAM, along with  using tretinoin regularly and AcZone. Had a mole that became crusty around her left shoulder where her bra strap rubs, but it resolved. Still gets facial hair on her chin and she has to pluck daily. She believes there is some scarring from plucking in that area. She recently returned from a trip to [facility].    Patient is otherwise feeling well, without additional concerns.    Labs:  N/A    Physical Exam:  Vitals: There were no vitals taken for this visit.  SKIN: Total skin excluding the undergarment areas was performed. The exam included the head/face, neck, both arms, chest, back, abdomen, both legs, digits and/or nails.    - Bailey's skin type I, <100 nevi  - There are dome shaped bright red papules on the trunk.   - Multiple regular brown pigmented macules and papules are identified on the trunk and extremities.   - Scattered brown macules on sun exposed areas.    - no active acneiform papules or comedones on the face  - few hyperpigmented 1mm macules on chin  - No other lesions of concern on areas examined.     Medications:  Current Outpatient Medications   Medication Sig Dispense Refill     dapsone (ACZONE) 7.5 % gel Apply topically daily 90 g 1     diclofenac (VOLTAREN) 75 MG EC tablet Take 1 tablet (75 mg) by mouth 2 times daily as needed for moderate pain. 180 tablet 1     diclofenac (VOLTAREN) 75 MG EC tablet Take 1 tablet (75 mg) by mouth 2 times daily as needed for moderate pain 60 tablet 1     diclofenac (VOLTAREN) 75 MG EC tablet Take 1 tablet (75 mg) by mouth 2 times daily as needed for moderate pain 60 tablet 1     levonorgestrel (MIRENA) 20 MCG/24HR IUD 1 each by Intrauterine route once       methylPREDNISolone (MEDROL DOSEPAK) 4 MG tablet therapy pack Follow Package Directions 21 tablet 0     spironolactone (ALDACTONE) 100 MG tablet Take 2 tablets (200 mg) by mouth daily 180 tablet 3     tiZANidine (ZANAFLEX) 4 MG tablet Take 1 tablet (4 mg) by mouth nightly as needed for muscle spasms 30  tablet 1     tiZANidine (ZANAFLEX) 4 MG tablet Take 1 tablet (4 mg) by mouth nightly as needed for muscle spasms 30 tablet 0     tretinoin (RETIN-A) 0.025 % external cream Use every night 20 g 1     tretinoin (RETIN-A) 0.05 % external cream Apply topically at bedtime 20 g 1     Current Facility-Administered Medications   Medication Dose Route Frequency Provider Last Rate Last Admin     lidocaine (PF) (XYLOCAINE) 1 % injection 4 mL  4 mL      4 mL at 05/30/24 1633     lidocaine (PF) (XYLOCAINE) 1 % injection 4 mL  4 mL   Nii Gomez MD   4 mL at 01/05/24 1332     lidocaine (PF) (XYLOCAINE) 1 % injection 4 mL  4 mL   Nii Gomez MD   4 mL at 10/10/23 1351     triamcinolone (KENALOG-40) injection 40 mg  40 mg      40 mg at 05/30/24 1633     triamcinolone (KENALOG-40) injection 40 mg  40 mg   Nii Gomez MD   40 mg at 01/05/24 1332     triamcinolone (KENALOG-40) injection 40 mg  40 mg   Nii Gomez MD   40 mg at 10/10/23 1351      Past Medical/Surgical History:   Patient Active Problem List   Diagnosis     Family history of melanoma     Acne vulgaris     Old tear of medial meniscus of right knee, unspecified tear type     Arthritis of left hip     Chronic left-sided low back pain with left-sided sciatica     Lumbar radicular pain     Past Medical History:   Diagnosis Date     Acne vulgaris 10/26/2017         Again, thank you for allowing me to participate in the care of your patient.      Sincerely,    Delfina Rowley PA-C

## 2024-11-18 ENCOUNTER — TELEPHONE (OUTPATIENT)
Dept: DERMATOLOGY | Facility: CLINIC | Age: 39
End: 2024-11-18
Payer: COMMERCIAL

## 2024-11-18 NOTE — TELEPHONE ENCOUNTER
Prescription for Vainqa sent to pharmacy however this medication is no longer available.  stopped making a few years ago. Please follow up with patient on alternative

## 2024-11-18 NOTE — TELEPHONE ENCOUNTER
Called Quiana to inform her that we had the printed order which should have all the information that a pharmacy would need. If she finds she needs a new or different printed form I asked that she keep in contact with us and let us know.     Quiana requested we mail the form to her which I have placed in the mail to go out today 11/18/2024.     Jay Jay Thompson, EMT  Clinic Support  [facility]     [phone]    Employed by [employer]

## 2024-11-19 DIAGNOSIS — Z91.041 CONTRAST MEDIA ALLERGY: Primary | ICD-10-CM

## 2024-11-19 RX ORDER — METHYLPREDNISOLONE 16 MG/1
TABLET ORAL
Qty: 4 TABLET | Refills: 0 | Status: SHIPPED | OUTPATIENT
Start: 2024-11-19

## 2024-12-02 ENCOUNTER — THERAPY VISIT (OUTPATIENT)
Dept: PHYSICAL THERAPY | Facility: CLINIC | Age: 39
End: 2024-12-02
Payer: COMMERCIAL

## 2024-12-02 DIAGNOSIS — G89.29 CHRONIC LEFT-SIDED LOW BACK PAIN WITH LEFT-SIDED SCIATICA: ICD-10-CM

## 2024-12-02 DIAGNOSIS — M16.12 ARTHRITIS OF LEFT HIP: Primary | ICD-10-CM

## 2024-12-02 DIAGNOSIS — M54.42 CHRONIC LEFT-SIDED LOW BACK PAIN WITH LEFT-SIDED SCIATICA: ICD-10-CM

## 2024-12-02 DIAGNOSIS — M54.16 LUMBAR RADICULAR PAIN: ICD-10-CM

## 2024-12-02 PROCEDURE — 97140 MANUAL THERAPY 1/> REGIONS: CPT | Mod: GP | Performed by: PHYSICAL THERAPIST

## 2024-12-02 PROCEDURE — 97530 THERAPEUTIC ACTIVITIES: CPT | Mod: GP | Performed by: PHYSICAL THERAPIST

## 2024-12-02 PROCEDURE — 97110 THERAPEUTIC EXERCISES: CPT | Mod: GP | Performed by: PHYSICAL THERAPIST

## 2024-12-02 NOTE — PROGRESS NOTES
Therapist Impression:   Findings today suggestive of piriformis pain.  Progressed strengthening.  Continue TPR for pain relief.    NEXT: Core stability, gym program    PTRX: NA    GOALS: lifting/carrying, walking without a cane, gym program    Subjective:  Went on a trip and was sore.  Having trouble with stairs.  Pain maybe has decreased a little bit.  A little more sore today but not as bad.   Manual release of piriformis helps.      Objective:  Hip ER HHD 39 L and 47 R.

## 2024-12-18 ENCOUNTER — THERAPY VISIT (OUTPATIENT)
Dept: PHYSICAL THERAPY | Facility: CLINIC | Age: 39
End: 2024-12-18
Payer: COMMERCIAL

## 2024-12-18 DIAGNOSIS — M16.12 ARTHRITIS OF LEFT HIP: Primary | ICD-10-CM

## 2024-12-18 DIAGNOSIS — G89.29 CHRONIC LEFT-SIDED LOW BACK PAIN WITH LEFT-SIDED SCIATICA: ICD-10-CM

## 2024-12-18 DIAGNOSIS — M54.42 CHRONIC LEFT-SIDED LOW BACK PAIN WITH LEFT-SIDED SCIATICA: ICD-10-CM

## 2024-12-18 DIAGNOSIS — M54.16 LUMBAR RADICULAR PAIN: ICD-10-CM

## 2024-12-18 PROCEDURE — 97110 THERAPEUTIC EXERCISES: CPT | Mod: GP | Performed by: PHYSICAL THERAPIST

## 2024-12-18 PROCEDURE — 97140 MANUAL THERAPY 1/> REGIONS: CPT | Mod: GP | Performed by: PHYSICAL THERAPIST

## 2024-12-18 PROCEDURE — 97530 THERAPEUTIC ACTIVITIES: CPT | Mod: GP | Performed by: PHYSICAL THERAPIST

## 2024-12-18 ASSESSMENT — ACTIVITIES OF DAILY LIVING (ADL)
ADL_TOTAL_ITEM_SCORE: 0
RECREATIONAL ACTIVITIES: SLIGHT DIFFICULTY
GETTING INTO AND OUT OF AN AVERAGE CAR: MODERATE DIFFICULTY
TWISTING/PIVOTING_ON_INVOLVED_LEG: NO DIFFICULTY AT ALL
HOW_WOULD_YOU_RATE_YOUR_CURRENT_LEVEL_OF_FUNCTION_DURING_YOUR_USUAL_ACTIVITIES_OF_DAILY_LIVING_FROM_0_TO_100_WITH_100_BEING_YOUR_LEVEL_OF_FUNCTION_PRIOR_TO_YOUR_HIP_PROBLEM_AND_0_BEING_THE_INABILITY_TO_PERFORM_ANY_OF_YOUR_USUAL_DAILY_ACTIVITIES?: 70
WALKING_15_MINUTES_OR_GREATER: MODERATE DIFFICULTY
PUTTING ON SOCKS AND SHOES: SLIGHT DIFFICULTY
WALKING_DOWN_STEEP_HILLS: SLIGHT DIFFICULTY
ADL_COUNT: 17
PUTTING_ON_SOCKS_AND_SHOES: SLIGHT DIFFICULTY
ADL_SCORE: 0
WALKING_15_MINUTES_OR_GREATER: MODERATE DIFFICULTY
DEEP_SQUATTING: UNABLE TO DO
LIGHT_TO_MODERATE_WORK: SLIGHT DIFFICULTY
STANDING_FOR_15_MINUTES: MODERATE DIFFICULTY
HEAVY_WORK: EXTREME DIFFICULTY
WALKING_APPROXIMATELY_10_MINUTES: SLIGHT DIFFICULTY
WALKING_INITIALLY: SLIGHT DIFFICULTY
HOS_ADL_HIGHEST_POTENTIAL_SCORE: 68
HOS_ADL_SCORE(%): 61.76
LIGHT_TO_MODERATE_WORK: SLIGHT DIFFICULTY
SITTING FOR 15 MINUTES: SLIGHT DIFFICULTY
WALKING_UP_STEEP_HILLS: MODERATE DIFFICULTY
WALKING_DOWN_STEEP_HILLS: SLIGHT DIFFICULTY
STANDING FOR 15 MINUTES: MODERATE DIFFICULTY
ADL_HIGHEST_POTENTIAL_SCORE: 68
ROLLING OVER IN BED: SLIGHT DIFFICULTY
HEAVY_WORK: EXTREME DIFFICULTY
HOW_WOULD_YOU_RATE_YOUR_CURRENT_LEVEL_OF_FUNCTION_DURING_YOUR_USUAL_ACTIVITIES_OF_DAILY_LIVING_FROM_0_TO_100_WITH_100_BEING_YOUR_LEVEL_OF_FUNCTION_PRIOR_TO_YOUR_HIP_PROBLEM_AND_0_BEING_THE_INABILITY_TO_PERFORM_ANY_OF_YOUR_USUAL_DAILY_ACTIVITIES?: 70
GOING_UP_1_FLIGHT_OF_STAIRS: MODERATE DIFFICULTY
GETTING_INTO_AND_OUT_OF_AN_AVERAGE_CAR: MODERATE DIFFICULTY
WALKING_FOR_APPROXIMATELY_10_MINUTES: SLIGHT DIFFICULTY
STEPPING UP AND DOWN CURBS: SLIGHT DIFFICULTY
DEEP SQUATTING: UNABLE TO DO
HOS_ADL_ITEM_SCORE_TOTAL: 42
WALKING_UP_STEEP_HILLS: MODERATE DIFFICULTY
GETTING_INTO_AND_OUT_OF_A_BATHTUB: SLIGHT DIFFICULTY
TWISTING/PIVOTING ON INVOLVED LEG: NO DIFFICULTY AT ALL
ROLLING_OVER_IN_BED: SLIGHT DIFFICULTY
WALKING_INITIALLY: SLIGHT DIFFICULTY
GOING_DOWN_1_FLIGHT_OF_STAIRS: SLIGHT DIFFICULTY
SITTING_FOR_15_MINUTES: SLIGHT DIFFICULTY
GOING UP 1 FLIGHT OF STAIRS: MODERATE DIFFICULTY
GETTING_INTO_AND_OUT_OF_A_BATHTUB: SLIGHT DIFFICULTY
GOING DOWN 1 FLIGHT OF STAIRS: SLIGHT DIFFICULTY
RECREATIONAL_ACTIVITIES: SLIGHT DIFFICULTY
STEPPING_UP_AND_DOWN_CURBS: SLIGHT DIFFICULTY

## 2024-12-18 NOTE — PROGRESS NOTES
PLAN  Continue therapy per current plan of care.    Beginning/End Dates of Progress Note Reporting Period:  10/14/24 to 12/18/2024    Referring Provider:  Nii Gomez  Therapist Impression:   Continue progressing strengthening as tolerated.  Reconsider squats in future    NEXT: Core stability, gym program    PTRX: NA    GOALS: lifting/carrying, walking without a cane, gym program    Subjective:  Massage helped unlock the hip.  Last few nights have woken up due to pain.  Feels like she has a little more movement.    Objective:    Hip ROM Flexion ER  IR Extension   Left 120 65 5 30   Right na n na 40          Objective:  Hip ER HHD 45 L and 47 R. 5/5 MMT    Hip abd MMT crepitus

## 2024-12-20 ENCOUNTER — ANCILLARY PROCEDURE (OUTPATIENT)
Dept: GENERAL RADIOLOGY | Facility: CLINIC | Age: 39
End: 2024-12-20
Attending: PREVENTIVE MEDICINE
Payer: COMMERCIAL

## 2024-12-20 DIAGNOSIS — M51.16 LUMBAR DISC HERNIATION WITH RADICULOPATHY: ICD-10-CM

## 2024-12-20 PROCEDURE — 62323 NJX INTERLAMINAR LMBR/SAC: CPT | Performed by: RADIOLOGY

## 2024-12-20 RX ORDER — LIDOCAINE HYDROCHLORIDE 10 MG/ML
5 INJECTION, SOLUTION INFILTRATION; PERINEURAL ONCE
OUTPATIENT
Start: 2024-12-20 | End: 2024-12-20

## 2024-12-20 RX ORDER — LIDOCAINE HYDROCHLORIDE 10 MG/ML
5 INJECTION, SOLUTION INFILTRATION; PERINEURAL ONCE
Status: COMPLETED | OUTPATIENT
Start: 2024-12-20 | End: 2024-12-20

## 2024-12-20 RX ORDER — IOPAMIDOL 408 MG/ML
2 INJECTION, SOLUTION INTRATHECAL ONCE
OUTPATIENT
Start: 2024-12-20 | End: 2024-12-20

## 2024-12-20 RX ORDER — BUPIVACAINE HYDROCHLORIDE 5 MG/ML
2 INJECTION, SOLUTION PERINEURAL ONCE
Status: COMPLETED | OUTPATIENT
Start: 2024-12-20 | End: 2024-12-20

## 2024-12-20 RX ORDER — METHYLPREDNISOLONE ACETATE 80 MG/ML
80 INJECTION, SUSPENSION INTRA-ARTICULAR; INTRALESIONAL; INTRAMUSCULAR; SOFT TISSUE ONCE
OUTPATIENT
Start: 2024-12-20 | End: 2024-12-20

## 2024-12-20 RX ORDER — IOPAMIDOL 408 MG/ML
2 INJECTION, SOLUTION INTRATHECAL ONCE
Status: COMPLETED | OUTPATIENT
Start: 2024-12-20 | End: 2024-12-20

## 2024-12-20 RX ORDER — METHYLPREDNISOLONE ACETATE 80 MG/ML
80 INJECTION, SUSPENSION INTRA-ARTICULAR; INTRALESIONAL; INTRAMUSCULAR; SOFT TISSUE ONCE
Status: COMPLETED | OUTPATIENT
Start: 2024-12-20 | End: 2024-12-20

## 2024-12-20 RX ORDER — BUPIVACAINE HYDROCHLORIDE 5 MG/ML
2 INJECTION, SOLUTION PERINEURAL ONCE
OUTPATIENT
Start: 2024-12-20 | End: 2024-12-20

## 2024-12-20 RX ADMIN — IOPAMIDOL 2 ML: 408 INJECTION, SOLUTION INTRATHECAL at 15:33

## 2024-12-20 RX ADMIN — METHYLPREDNISOLONE ACETATE 80 MG: 80 INJECTION, SUSPENSION INTRA-ARTICULAR; INTRALESIONAL; INTRAMUSCULAR; SOFT TISSUE at 15:34

## 2024-12-20 RX ADMIN — LIDOCAINE HYDROCHLORIDE 5 ML: 10 INJECTION, SOLUTION INFILTRATION; PERINEURAL at 15:33

## 2024-12-20 RX ADMIN — BUPIVACAINE HYDROCHLORIDE 10 MG: 5 INJECTION, SOLUTION PERINEURAL at 15:33

## 2024-12-20 NOTE — PROGRESS NOTES
AFTER YOU GO HOME    DO relax; minimize your activity for 24 hours  You may resume normal activity tomorrow  You may remove the bandage in the evening or next morning  You may resume bathing the next day  Drink at least 4 extra glasses of fluid today if not on fluid restrictions  DO NOT drive or operate machinery at home or at work for at least 24 hours      VISIT THE EMERGENCY ROOM OR URGENT CARE IF:    There is redness or swelling at the injection site  There is discharge from the injection site  You develop a temperature of 101  F or greater      ADDITIONAL INSTRUCTIONS:     You may resume your Coumadin or other blood thinner at your regular dose today.  Follow up with your physician to have your INR rechecked if indicated.  If you gain no relief from the injection after two (2) weeks, follow-up with your provider for your options.        Contacts:    During business hours from 8 to 5 pm, you may call [phone] to reach a nurse advisor at [facility].  After hours, call [facility]  [phone].  Ask for the Radiologist on-call.  Someone is on-call 24 hrs/day.  [facility] Toll Free Number   [phone]  
Quiana was seen in X-ray today for a epidural injection. Patient rated pain before procedure 3/10. After procedure patient rated pain 1/10.   This pain level is acceptable to patient. Patient discharged home with .  
4 (moderate pain)

## 2024-12-30 ENCOUNTER — THERAPY VISIT (OUTPATIENT)
Dept: PHYSICAL THERAPY | Facility: CLINIC | Age: 39
End: 2024-12-30
Payer: COMMERCIAL

## 2024-12-30 DIAGNOSIS — G89.29 CHRONIC LEFT-SIDED LOW BACK PAIN WITH LEFT-SIDED SCIATICA: ICD-10-CM

## 2024-12-30 DIAGNOSIS — M54.42 CHRONIC LEFT-SIDED LOW BACK PAIN WITH LEFT-SIDED SCIATICA: ICD-10-CM

## 2024-12-30 DIAGNOSIS — M16.12 ARTHRITIS OF LEFT HIP: Primary | ICD-10-CM

## 2024-12-30 DIAGNOSIS — M54.16 LUMBAR RADICULAR PAIN: ICD-10-CM

## 2024-12-30 PROCEDURE — 97530 THERAPEUTIC ACTIVITIES: CPT | Mod: GP | Performed by: PHYSICAL THERAPIST

## 2024-12-30 PROCEDURE — 97140 MANUAL THERAPY 1/> REGIONS: CPT | Mod: GP | Performed by: PHYSICAL THERAPIST

## 2024-12-30 NOTE — PROGRESS NOTES
Therapist Impression:   Focus on MT today due to having an increase of pain since injection and being on vacation.    NEXT: Core stability, gym program    PTRX: NA    GOALS: lifting/carrying, walking without a cane, gym program    Subjective:  Mark and back feels more sensitive.  Giving her more issues.  10% improvement following initial injection.  Biking bothered hip.      Objective:    Hip ROM Flexion ER  IR Extension   Left 120 65 5 30   Right na 65 25 40

## 2025-01-06 ENCOUNTER — VIRTUAL VISIT (OUTPATIENT)
Dept: ORTHOPEDICS | Facility: CLINIC | Age: 40
End: 2025-01-06
Attending: PREVENTIVE MEDICINE
Payer: COMMERCIAL

## 2025-01-06 DIAGNOSIS — M54.16 LUMBAR RADICULAR PAIN: ICD-10-CM

## 2025-01-06 DIAGNOSIS — M51.16 LUMBAR DISC HERNIATION WITH RADICULOPATHY: Primary | ICD-10-CM

## 2025-01-06 DIAGNOSIS — M16.12 ARTHRITIS OF LEFT HIP: ICD-10-CM

## 2025-01-06 PROCEDURE — 98013 SYNCH AUDIO-ONLY EST LOW 20: CPT | Performed by: PREVENTIVE MEDICINE

## 2025-01-06 NOTE — PROGRESS NOTES
Patient is a   39  year old who is being evaluated via a billable telephone visit.      What phone number would you like to be contacted at? CELL  How would you like to obtain your AVS? LYNN        Subjective   Patient is a   39  year old who presents by phone call visit for the following:     HPI   Followup for lumbar injection she had about two weeks ago  She states that she has not had much improvement yet in her low back pain  She is wondering how much her hip and leg length discrepancy plays a role  She has continued to have pain in low back  And continues to do her PT which does help it just doesn't fix it    Review of Systems   Constitutional, HEENT, cardiovascular, pulmonary, gi and gu systems are negative, except as otherwise noted.      Objective           Vitals:  No vitals were obtained today due to virtual visit.    Physical Exam   healthy, alert, and no distress  PSYCH: Alert and oriented times 3; coherent speech, normal   rate and volume, able to articulate logical thoughts, able   to abstract reason, no tangential thoughts, no hallucinations   or delusions  His affect is normal  RESP: No cough, no audible wheezing, able to talk in full sentences  Remainder of exam unable to be completed due to telephone visits    Assessment/Plan  38 yo female with left hip arthritis, severe, and lumbar ddd, radiculopathy and low back pain not resolved        I independently reviewed the following imaging studies and discussed with patient:  Lumbar MRI shows ddd, disc herniations  Discussed and can consider further treatments  And she should cont. PT and medications PRN  Followup 1 month for discussion about next steps and possible ordering another injection        Phone call duration: 20 minutes  Phone call start: 1200  Phone call end: 1220  Dr Gomez

## 2025-01-06 NOTE — LETTER
1/6/2025      Quiana Shepherd  [address] [facility] [address]      Dear Colleague,    Thank you for referring your patient, Quiana Shepherd, to the [facility] [place]. Please see a copy of my visit note below.    Patient is a   39  year old who is being evaluated via a billable telephone visit.      What phone number would you like to be contacted at? CELL  How would you like to obtain your AVS? MYCHART        Subjective   Patient is a   39  year old who presents by phone call visit for the following:     HPI   Followup for lumbar injection she had about two weeks ago  She states that she has not had much improvement yet in her low back pain  She is wondering how much her hip and leg length discrepancy plays a role  She has continued to have pain in low back  And continues to do her PT which does help it just doesn't fix it    Review of Systems   Constitutional, HEENT, cardiovascular, pulmonary, gi and gu systems are negative, except as otherwise noted.      Objective           Vitals:  No vitals were obtained today due to virtual visit.    Physical Exam   healthy, alert, and no distress  PSYCH: Alert and oriented times 3; coherent speech, normal   rate and volume, able to articulate logical thoughts, able   to abstract reason, no tangential thoughts, no hallucinations   or delusions  His affect is normal  RESP: No cough, no audible wheezing, able to talk in full sentences  Remainder of exam unable to be completed due to telephone visits    Assessment/Plan  40 yo female with left hip arthritis, severe, and lumbar ddd, radiculopathy and low back pain not resolved        I independently reviewed the following imaging studies and discussed with patient:  Lumbar MRI shows ddd, disc herniations  Discussed and can consider further treatments  And she should cont. PT and medications PRN  Followup 1 month for discussion about next steps and possible ordering another injection         Phone call duration: 20 minutes  Phone call start: 1200  Phone call end: 1220  Dr Gomez      Again, thank you for allowing me to participate in the care of your patient.        Sincerely,      Nii Gomez MD    Electronically signed

## 2025-01-06 NOTE — LETTER
1/6/2025      Quiana Shepherd  [address] [facility] [address]      Dear Colleague,    Thank you for referring your patient, Quiana Shepherd, to the [facility] SPORTS MEDICINE CLINIC [place]. Please see a copy of my visit note below.    Patient is a   39  year old who is being evaluated via a billable telephone visit.      What phone number would you like to be contacted at? CELL  How would you like to obtain your AVS? MYCHART        Subjective   Patient is a   39  year old who presents by phone call visit for the following:     HPI   Followup for lumbar injection she had about two weeks ago  She states that she has not had much improvement yet in her low back pain  She is wondering how much her hip and leg length discrepancy plays a role  She has continued to have pain in low back  And continues to do her PT which does help it just doesn't fix it    Review of Systems   Constitutional, HEENT, cardiovascular, pulmonary, gi and gu systems are negative, except as otherwise noted.      Objective           Vitals:  No vitals were obtained today due to virtual visit.    Physical Exam   healthy, alert, and no distress  PSYCH: Alert and oriented times 3; coherent speech, normal   rate and volume, able to articulate logical thoughts, able   to abstract reason, no tangential thoughts, no hallucinations   or delusions  His affect is normal  RESP: No cough, no audible wheezing, able to talk in full sentences  Remainder of exam unable to be completed due to telephone visits    Assessment/Plan  40 yo female with left hip arthritis, severe, and lumbar ddd, radiculopathy and low back pain not resolved        I independently reviewed the following imaging studies and discussed with patient:  Lumbar MRI shows ddd, disc herniations  Discussed and can consider further treatments  And she should cont. PT and medications PRN  Followup 1 month for discussion about next steps and possible ordering another injection         Phone call duration: 20 minutes  Phone call start: 1200  Phone call end: 1220  Dr Gomez      Again, thank you for allowing me to participate in the care of your patient.        Sincerely,      Nii Gomez MD    Electronically signed

## 2025-01-22 ENCOUNTER — THERAPY VISIT (OUTPATIENT)
Dept: PHYSICAL THERAPY | Facility: CLINIC | Age: 40
End: 2025-01-22
Payer: COMMERCIAL

## 2025-01-22 DIAGNOSIS — M54.16 LUMBAR RADICULAR PAIN: ICD-10-CM

## 2025-01-22 DIAGNOSIS — M54.42 CHRONIC LEFT-SIDED LOW BACK PAIN WITH LEFT-SIDED SCIATICA: ICD-10-CM

## 2025-01-22 DIAGNOSIS — G89.29 CHRONIC LEFT-SIDED LOW BACK PAIN WITH LEFT-SIDED SCIATICA: ICD-10-CM

## 2025-01-22 DIAGNOSIS — M16.12 ARTHRITIS OF LEFT HIP: Primary | ICD-10-CM

## 2025-01-22 PROCEDURE — 97530 THERAPEUTIC ACTIVITIES: CPT | Mod: GP | Performed by: PHYSICAL THERAPIST

## 2025-01-22 PROCEDURE — 97110 THERAPEUTIC EXERCISES: CPT | Mod: GP | Performed by: PHYSICAL THERAPIST

## 2025-01-22 NOTE — PROGRESS NOTES
Therapist Impression:   Long discussion regarding current status, thoughts on surgery vs continuing therapy vs. Hip joint injection.  Continue to progress strengthening as tolerated and attempt to progress to more standing/hinge exercises next.    NEXT: hinge and standing exercises    PTRX: NA    GOALS: lifting/carrying, walking without a cane, gym program    Subjective:  Not much improvement from injection.  Flexing while standing with weight bothers.  Getting up stairs is a little less painful.  Avoiding walks and planing vacations because of hip.      Objective:  Limited hip IR L 10 deg, 25 deg R  Can do full side plank on L for 1 min

## 2025-02-03 ENCOUNTER — VIRTUAL VISIT (OUTPATIENT)
Dept: ORTHOPEDICS | Facility: CLINIC | Age: 40
End: 2025-02-03
Payer: COMMERCIAL

## 2025-02-03 DIAGNOSIS — M16.12 ARTHRITIS OF LEFT HIP: ICD-10-CM

## 2025-02-03 DIAGNOSIS — M51.16 LUMBAR DISC HERNIATION WITH RADICULOPATHY: Primary | ICD-10-CM

## 2025-02-03 PROCEDURE — 98006 SYNCH AUDIO-VIDEO EST MOD 30: CPT | Performed by: PREVENTIVE MEDICINE

## 2025-02-03 RX ORDER — DICLOFENAC SODIUM 75 MG/1
75 TABLET, DELAYED RELEASE ORAL 2 TIMES DAILY PRN
Qty: 180 TABLET | Refills: 0 | Status: SHIPPED | OUTPATIENT
Start: 2025-02-03

## 2025-02-03 NOTE — PROGRESS NOTES
Quiana is a 39 year old who is being evaluated via a billable video visit.    How would you like to obtain your AVS? MyChart  If the video visit is dropped, the invitation should be resent by: Text to cell phone: [phone]  Will anyone else be joining your video visit? No        Subjective   Quiana is a 39 year old, presenting for the following health issues:  RECHECK (Follow up low back/left hip)    HPI     Followup for lumbar injection and left hip pain  States that epidural injection last month did not improve her symptoms  Her left hip seems to be more bothersome and has been using medications every day for management  She thinks she might want to talk to Dr Mohamud about doing a hip replacement at this point        Review of Systems  Constitutional, HEENT, cardiovascular, pulmonary, gi and gu systems are negative, except as otherwise noted.      Objective           Vitals:  No vitals were obtained today due to virtual visit.    Physical Exam   GENERAL: alert and no distress  EYES: Eyes grossly normal to inspection.  No discharge or erythema, or obvious scleral/conjunctival abnormalities.  RESP: No audible wheeze, cough, or visible cyanosis.    SKIN: Visible skin clear. No significant rash, abnormal pigmentation or lesions.  NEURO: Cranial nerves grossly intact.  Mentation and speech appropriate for age.  PSYCH: Appropriate affect, tone, and pace of words    Assessment/Plan  38 yo female with left hip arthritis, worse, and lumbar radicular pain, stable    Reviewed left hip xray: shows arthritis  Discussed and referred back to Dr Mohamud to discuss hip replacement  Cont. Medications as written  Followup PRN          Video-Visit Details    Type of service:  Video Visit   Video start: 1240pm  Video end: 100pm    Originating Location (pt. Location): Home    Dr Gomez

## 2025-02-03 NOTE — LETTER
2/3/2025      Quiana Shepherd  [address] [facility] [address]      Dear Colleague,    Thank you for referring your patient, Quiana Shepherd, to the [facility]IC[facility] CLINIC [place]. Please see a copy of my visit note below.    Quiana is a 39 year old who is being evaluated via a billable video visit.    How would you like to obtain your AVS? MyChart  If the video visit is dropped, the invitation should be resent by: Text to cell phone: [phone]  Will anyone else be joining your video visit? No        Subjective  Quiana is a 39 year old, presenting for the following health issues:  RECHECK (Follow up low back/left hip)    HPI     Followup for lumbar injection and left hip pain  States that epidural injection last month did not improve her symptoms  Her left hip seems to be more bothersome and has been using medications every day for management  She thinks she might want to talk to Dr Mohamud about doing a hip replacement at this point        Review of Systems  Constitutional, HEENT, cardiovascular, pulmonary, gi and gu systems are negative, except as otherwise noted.      Objective          Vitals:  No vitals were obtained today due to virtual visit.    Physical Exam   GENERAL: alert and no distress  EYES: Eyes grossly normal to inspection.  No discharge or erythema, or obvious scleral/conjunctival abnormalities.  RESP: No audible wheeze, cough, or visible cyanosis.    SKIN: Visible skin clear. No significant rash, abnormal pigmentation or lesions.  NEURO: Cranial nerves grossly intact.  Mentation and speech appropriate for age.  PSYCH: Appropriate affect, tone, and pace of words    Assessment/Plan  38 yo female with left hip arthritis, worse, and lumbar radicular pain, stable    Reviewed left hip xray: shows arthritis  Discussed and referred back to Dr Mohamud to discuss hip replacement  Cont. Medications as written  Followup PRN          Video-Visit Details    Type of service:  Video  Visit   Video start: 1240pm  Video end: 100pm    Originating Location (pt. Location): Home    Dr Gomez      Again, thank you for allowing me to participate in the care of your patient.        Sincerely,    Nii Gomez MD    Electronically signed

## 2025-02-03 NOTE — LETTER
2/3/2025      Quiana Shepherd  [address] [facility] [address]      Dear Colleague,    Thank you for referring your patient, Quiana Shepherd, to the [facility] [place]. Please see a copy of my visit note below.    Quiana is a 39 year old who is being evaluated via a billable video visit.    How would you like to obtain your AVS? MyChart  If the video visit is dropped, the invitation should be resent by: Text to cell phone: [phone]  Will anyone else be joining your video visit? No        Subjective  Quiana is a 39 year old, presenting for the following health issues:  RECHECK (Follow up low back/left hip)    HPI     Followup for lumbar injection and left hip pain  States that epidural injection last month did not improve her symptoms  Her left hip seems to be more bothersome and has been using medications every day for management  She thinks she might want to talk to Dr Mohamud about doing a hip replacement at this point        Review of Systems  Constitutional, HEENT, cardiovascular, pulmonary, gi and gu systems are negative, except as otherwise noted.      Objective          Vitals:  No vitals were obtained today due to virtual visit.    Physical Exam   GENERAL: alert and no distress  EYES: Eyes grossly normal to inspection.  No discharge or erythema, or obvious scleral/conjunctival abnormalities.  RESP: No audible wheeze, cough, or visible cyanosis.    SKIN: Visible skin clear. No significant rash, abnormal pigmentation or lesions.  NEURO: Cranial nerves grossly intact.  Mentation and speech appropriate for age.  PSYCH: Appropriate affect, tone, and pace of words    Assessment/Plan  40 yo female with left hip arthritis, worse, and lumbar radicular pain, stable    Reviewed left hip xray: shows arthritis  Discussed and referred back to Dr Mohamud to discuss hip replacement  Cont. Medications as written  Followup PRN          Video-Visit Details    Type of service:  Video  Visit   Video start: 1240pm  Video end: 100pm    Originating Location (pt. Location): Home    Dr Gomez      Again, thank you for allowing me to participate in the care of your patient.        Sincerely,    Nii Gomez MD    Electronically signed

## 2025-02-05 ENCOUNTER — THERAPY VISIT (OUTPATIENT)
Dept: PHYSICAL THERAPY | Facility: CLINIC | Age: 40
End: 2025-02-05
Payer: COMMERCIAL

## 2025-02-05 DIAGNOSIS — M54.16 LUMBAR RADICULAR PAIN: ICD-10-CM

## 2025-02-05 DIAGNOSIS — M54.42 CHRONIC LEFT-SIDED LOW BACK PAIN WITH LEFT-SIDED SCIATICA: ICD-10-CM

## 2025-02-05 DIAGNOSIS — M16.12 ARTHRITIS OF LEFT HIP: Primary | ICD-10-CM

## 2025-02-05 DIAGNOSIS — G89.29 CHRONIC LEFT-SIDED LOW BACK PAIN WITH LEFT-SIDED SCIATICA: ICD-10-CM

## 2025-02-05 PROCEDURE — 97110 THERAPEUTIC EXERCISES: CPT | Mod: GP | Performed by: PHYSICAL THERAPIST

## 2025-02-05 PROCEDURE — 97530 THERAPEUTIC ACTIVITIES: CPT | Mod: GP | Performed by: PHYSICAL THERAPIST

## 2025-02-05 NOTE — PROGRESS NOTES
PLAN  2 x month, 2 months, see therapist impression.    Beginning/End Dates of Progress Note Reporting Period:    to 02/05/2025    Referring Provider:  Nii Gomez  Therapist Impression:   Long discussion regarding current status.  Discussed rational for surgery and surgery planning.  Discussed exercises to continue and modify given the current flare up.  Continue to focus on strengthening, STM, and pt education for surgery in upcoming visits.    NEXT: STM and bed mobility, UE strengthening    PTRX: NA    GOALS: lifting/carrying, walking without a cane, gym program    Subjective:  Back slid a bit.  Trying bridging bothered.  Tried walking with cane and did 0.8 miles and was sore.  Did a bike ride that had a little more elevation.  Took a few days off but still bothersome.  This is a new pain.  Using a walking pad was significantly less distance than previously.     Objective:  Limited hip IR L 10 deg  Unable to lift leg from modified side plank hip abduction position with audible clunk.  Crepitus noted with hip ROM.

## 2025-02-10 NOTE — TELEPHONE ENCOUNTER
FUTURE VISIT INFORMATION      SURGERY INFORMATION:  Date: 4/9/25  Location: [facility] or  Surgeon:  Jr Mohamud MD   Anesthesia Type:  Spinal  Procedure: ARTHROPLASTY, HIP, TOTAL     RECORDS REQUESTED FROM:       Pertinent Medical History: None

## 2025-02-19 ENCOUNTER — THERAPY VISIT (OUTPATIENT)
Dept: PHYSICAL THERAPY | Facility: CLINIC | Age: 40
End: 2025-02-19
Payer: COMMERCIAL

## 2025-02-19 DIAGNOSIS — M16.12 ARTHRITIS OF LEFT HIP: Primary | ICD-10-CM

## 2025-02-19 DIAGNOSIS — G89.29 CHRONIC LEFT-SIDED LOW BACK PAIN WITH LEFT-SIDED SCIATICA: ICD-10-CM

## 2025-02-19 DIAGNOSIS — M54.42 CHRONIC LEFT-SIDED LOW BACK PAIN WITH LEFT-SIDED SCIATICA: ICD-10-CM

## 2025-02-19 DIAGNOSIS — M54.16 LUMBAR RADICULAR PAIN: ICD-10-CM

## 2025-02-19 PROCEDURE — 97140 MANUAL THERAPY 1/> REGIONS: CPT | Mod: GP | Performed by: PHYSICAL THERAPIST

## 2025-02-19 PROCEDURE — 97530 THERAPEUTIC ACTIVITIES: CPT | Mod: GP | Performed by: PHYSICAL THERAPIST

## 2025-02-19 PROCEDURE — 97110 THERAPEUTIC EXERCISES: CPT | Mod: GP | Performed by: PHYSICAL THERAPIST

## 2025-02-19 NOTE — PROGRESS NOTES
Therapist Impression:   Reviewed pt education regarding post op RADHA    NEXT: STM and bed mobility, UE strengthening    PTRX: NA    GOALS: lifting/carrying, walking without a cane, gym program    Subjective:  Doing the bridge but requiring more breaks.    Back slid a bit.  Trying bridging bothered.  Tried walking with cane and did 0.8 miles and was sore.  Did a bike ride that had a little more elevation.  Took a few days off but still bothersome.  This is a new pain.  Using a walking pad was significantly less distance than previously.     Objective:  None

## 2025-02-20 ENCOUNTER — DOCUMENTATION ONLY (OUTPATIENT)
Dept: ORTHOPEDICS | Facility: CLINIC | Age: 40
End: 2025-02-20
Payer: COMMERCIAL

## 2025-02-20 NOTE — PROGRESS NOTES
Received Completed forms Yes   Faxed Forms Emailed to patient at: [email or web address]   Sent to HIM (Date) 2/20/25

## 2025-03-17 ENCOUNTER — ANESTHESIA EVENT (OUTPATIENT)
Dept: SURGERY | Facility: CLINIC | Age: 40
End: 2025-03-17
Payer: COMMERCIAL

## 2025-03-17 ENCOUNTER — PRE VISIT (OUTPATIENT)
Dept: SURGERY | Facility: CLINIC | Age: 40
End: 2025-03-17

## 2025-03-17 ENCOUNTER — LAB (OUTPATIENT)
Dept: LAB | Facility: CLINIC | Age: 40
End: 2025-03-17
Payer: COMMERCIAL

## 2025-03-17 ENCOUNTER — THERAPY VISIT (OUTPATIENT)
Dept: PHYSICAL THERAPY | Facility: CLINIC | Age: 40
End: 2025-03-17
Payer: COMMERCIAL

## 2025-03-17 ENCOUNTER — OFFICE VISIT (OUTPATIENT)
Dept: SURGERY | Facility: CLINIC | Age: 40
End: 2025-03-17
Payer: COMMERCIAL

## 2025-03-17 VITALS
HEART RATE: 72 BPM | OXYGEN SATURATION: 98 % | RESPIRATION RATE: 16 BRPM | SYSTOLIC BLOOD PRESSURE: 117 MMHG | TEMPERATURE: 99 F | BODY MASS INDEX: 27.25 KG/M2 | DIASTOLIC BLOOD PRESSURE: 84 MMHG | HEIGHT: 67 IN | WEIGHT: 173.6 LBS

## 2025-03-17 DIAGNOSIS — Z01.818 PRE-OPERATIVE EXAMINATION: ICD-10-CM

## 2025-03-17 DIAGNOSIS — M16.32 OSTEOARTHRITIS OF LEFT HIP JOINT DUE TO DYSPLASIA: ICD-10-CM

## 2025-03-17 DIAGNOSIS — M54.16 LUMBAR RADICULAR PAIN: ICD-10-CM

## 2025-03-17 DIAGNOSIS — G89.29 CHRONIC LEFT-SIDED LOW BACK PAIN WITH LEFT-SIDED SCIATICA: ICD-10-CM

## 2025-03-17 DIAGNOSIS — M54.42 CHRONIC LEFT-SIDED LOW BACK PAIN WITH LEFT-SIDED SCIATICA: ICD-10-CM

## 2025-03-17 DIAGNOSIS — M16.12 ARTHRITIS OF LEFT HIP: Primary | ICD-10-CM

## 2025-03-17 DIAGNOSIS — Z01.818 PRE-OPERATIVE EXAMINATION: Primary | ICD-10-CM

## 2025-03-17 LAB
ANION GAP SERPL CALCULATED.3IONS-SCNC: 11 MMOL/L (ref 7–15)
BUN SERPL-MCNC: 15 MG/DL (ref 6–20)
CALCIUM SERPL-MCNC: 9.9 MG/DL (ref 8.8–10.4)
CHLORIDE SERPL-SCNC: 102 MMOL/L (ref 98–107)
CREAT SERPL-MCNC: 1.02 MG/DL (ref 0.51–0.95)
EGFRCR SERPLBLD CKD-EPI 2021: 71 ML/MIN/1.73M2
ERYTHROCYTE [DISTWIDTH] IN BLOOD BY AUTOMATED COUNT: 12.4 % (ref 10–15)
GLUCOSE SERPL-MCNC: 98 MG/DL (ref 70–99)
HCO3 SERPL-SCNC: 24 MMOL/L (ref 22–29)
HCT VFR BLD AUTO: 40.9 % (ref 35–47)
HGB BLD-MCNC: 13.6 G/DL (ref 11.7–15.7)
HOLD SPECIMEN: NORMAL
MCH RBC QN AUTO: 31.2 PG (ref 26.5–33)
MCHC RBC AUTO-ENTMCNC: 33.3 G/DL (ref 31.5–36.5)
MCV RBC AUTO: 94 FL (ref 78–100)
PLATELET # BLD AUTO: 363 10E3/UL (ref 150–450)
POTASSIUM SERPL-SCNC: 4.9 MMOL/L (ref 3.4–5.3)
RBC # BLD AUTO: 4.36 10E6/UL (ref 3.8–5.2)
SODIUM SERPL-SCNC: 137 MMOL/L (ref 135–145)
WBC # BLD AUTO: 9.1 10E3/UL (ref 4–11)

## 2025-03-17 PROCEDURE — 97530 THERAPEUTIC ACTIVITIES: CPT | Mod: GP | Performed by: PHYSICAL THERAPIST

## 2025-03-17 PROCEDURE — 36415 COLL VENOUS BLD VENIPUNCTURE: CPT | Performed by: PATHOLOGY

## 2025-03-17 PROCEDURE — 80048 BASIC METABOLIC PNL TOTAL CA: CPT | Performed by: PATHOLOGY

## 2025-03-17 PROCEDURE — 99203 OFFICE O/P NEW LOW 30 MIN: CPT | Performed by: PHYSICIAN ASSISTANT

## 2025-03-17 PROCEDURE — 97140 MANUAL THERAPY 1/> REGIONS: CPT | Mod: GP | Performed by: PHYSICAL THERAPIST

## 2025-03-17 PROCEDURE — 97110 THERAPEUTIC EXERCISES: CPT | Mod: GP | Performed by: PHYSICAL THERAPIST

## 2025-03-17 PROCEDURE — 85027 COMPLETE CBC AUTOMATED: CPT | Performed by: PATHOLOGY

## 2025-03-17 RX ORDER — ACETAMINOPHEN 325 MG/1
325-650 TABLET ORAL EVERY 6 HOURS PRN
COMMUNITY

## 2025-03-17 RX ORDER — IBUPROFEN 200 MG
200 TABLET ORAL EVERY 4 HOURS PRN
COMMUNITY

## 2025-03-17 ASSESSMENT — ACTIVITIES OF DAILY LIVING (ADL)
GOING DOWN 1 FLIGHT OF STAIRS: MODERATE DIFFICULTY
ROLLING OVER IN BED: SLIGHT DIFFICULTY
STANDING FOR 15 MINUTES: SLIGHT DIFFICULTY
HEAVY_WORK: MODERATE DIFFICULTY
HOS_ADL_ITEM_SCORE_TOTAL: 45
DEEP SQUATTING: MODERATE DIFFICULTY
GOING UP 1 FLIGHT OF STAIRS: MODERATE DIFFICULTY
WALKING_15_MINUTES_OR_GREATER: SLIGHT DIFFICULTY
STEPPING UP AND DOWN CURBS: SLIGHT DIFFICULTY
WALKING_DOWN_STEEP_HILLS: SLIGHT DIFFICULTY
LIGHT_TO_MODERATE_WORK: SLIGHT DIFFICULTY
HOS_ADL_HIGHEST_POTENTIAL_SCORE: 68
PUTTING ON SOCKS AND SHOES: SLIGHT DIFFICULTY
HOS_ADL_SCORE(%): 66.18
HOW_WOULD_YOU_RATE_YOUR_CURRENT_LEVEL_OF_FUNCTION_DURING_YOUR_USUAL_ACTIVITIES_OF_DAILY_LIVING_FROM_0_TO_100_WITH_100_BEING_YOUR_LEVEL_OF_FUNCTION_PRIOR_TO_YOUR_HIP_PROBLEM_AND_0_BEING_THE_INABILITY_TO_PERFORM_ANY_OF_YOUR_USUAL_DAILY_ACTIVITIES?: 60
WALKING_INITIALLY: SLIGHT DIFFICULTY
SITTING FOR 15 MINUTES: NO DIFFICULTY AT ALL
WALKING_APPROXIMATELY_10_MINUTES: SLIGHT DIFFICULTY
RECREATIONAL ACTIVITIES: MODERATE DIFFICULTY
GETTING INTO AND OUT OF AN AVERAGE CAR: MODERATE DIFFICULTY
TWISTING/PIVOTING ON INVOLVED LEG: SLIGHT DIFFICULTY
WALKING_UP_STEEP_HILLS: SLIGHT DIFFICULTY
GETTING_INTO_AND_OUT_OF_A_BATHTUB: SLIGHT DIFFICULTY

## 2025-03-17 ASSESSMENT — ENCOUNTER SYMPTOMS: SEIZURES: 0

## 2025-03-17 ASSESSMENT — LIFESTYLE VARIABLES: TOBACCO_USE: 0

## 2025-03-17 ASSESSMENT — PAIN SCALES - GENERAL: PAINLEVEL_OUTOF10: MILD PAIN (1)

## 2025-03-17 NOTE — H&P
Pre-Operative H & P     CC:  Preoperative exam to assess for increased cardiopulmonary risk while undergoing surgery and anesthesia.    Date of Encounter: 3/17/2025  Primary Care Physician:  No Ref-Primary, Physician     Reason for visit:   Encounter Diagnoses   Name Primary?    Pre-operative examination Yes    Osteoarthritis of left hip joint due to dysplasia        HPI  Quiana Shepherd is a 39 year old female who presents for pre-operative H & P in preparation for  Procedure Information       Case: [id number] Date/Time: 04/09/25 1015    Procedure: ARTHROPLASTY, HIP, TOTAL (Left: Hip)    Anesthesia type: Spinal    Diagnosis: Osteoarthritis of left hip joint due to dysplasia [M16.32]    Pre-op diagnosis: Osteoarthritis of left hip joint due to dysplasia [M16.32]    Location:  OR 12 / [facility] OR    Providers: Jr Mohamud MD            The patient is a 39 year old woman who has a past medical history significant for acne, nevi, hirsutism and arthritis of the lumbar region and hip. She met with Dr. Mohamud about her ongoing hip pain and has been scheduled for the procedure as above.     History is obtained from the patient and chart review    Hx of abnormal bleeding or anti-platelet use: none    Menstrual history: No LMP recorded (lmp unknown). (Menstrual status: IUD).:      Past Medical History  Past Medical History:   Diagnosis Date    Acne vulgaris 10/26/2017    Hirsutism     Osteoarthritis of left hip joint due to dysplasia        Past Surgical History  Past Surgical History:   Procedure Laterality Date    BACK SURGERY  1993    Surgery to install halo    ORTHOPEDIC SURGERY  2011    Left hip - TIFFANIE repair       Prior to Admission Medications  Current Outpatient Medications   Medication Sig Dispense Refill    acetaminophen (TYLENOL) 325 MG tablet Take 325-650 mg by mouth every 6 hours as needed for mild pain.      cholecalciferol (VITAMIN D3) 25 mcg (1000 units) capsule Take 1 capsule by mouth daily.       Cyanocobalamin (B-12) 1000 MCG TBCR Take by mouth.      dapsone (ACZONE) 7.5 % gel Apply topically daily. 90 g 1    diclofenac (VOLTAREN) 75 MG EC tablet Take 1 tablet (75 mg) by mouth 2 times daily as needed for moderate pain. 180 tablet 0    diclofenac (VOLTAREN) 75 MG EC tablet Take 1 tablet (75 mg) by mouth 2 times daily as needed for moderate pain. 180 tablet 1    diclofenac (VOLTAREN) 75 MG EC tablet Take 1 tablet (75 mg) by mouth 2 times daily as needed for moderate pain 60 tablet 1    diclofenac (VOLTAREN) 75 MG EC tablet Take 1 tablet (75 mg) by mouth 2 times daily as needed for moderate pain 60 tablet 1    ibuprofen (ADVIL/MOTRIN) 200 MG tablet Take 200 mg by mouth every 4 hours as needed for pain.      levonorgestrel (MIRENA) 20 MCG/24HR IUD 1 each by Intrauterine route once      spironolactone (ALDACTONE) 100 MG tablet Take 2 tablets (200 mg) by mouth daily. (Patient taking differently: Take 200 mg by mouth every morning.) 180 tablet 3    tiZANidine (ZANAFLEX) 4 MG tablet Take 1-2 tablets (4-8 mg) by mouth nightly as needed for muscle spasms. 90 tablet 0    tiZANidine (ZANAFLEX) 4 MG tablet Take 1 tablet (4 mg) by mouth nightly as needed for muscle spasms 30 tablet 1    tiZANidine (ZANAFLEX) 4 MG tablet Take 1 tablet (4 mg) by mouth nightly as needed for muscle spasms 30 tablet 0    tretinoin (RETIN-A) 0.025 % external cream Use every night 20 g 1    tretinoin (RETIN-A) 0.05 % external cream Apply topically at bedtime. 20 g 1    eflornithine HCl 13.9 % CREA Externally apply topically daily. (Patient not taking: Reported on 3/17/2025) 45 g 11    eflornithine HCl 13.9 % CREA Externally apply topically daily. 45 g 11    methylPREDNISolone (MEDROL DOSEPAK) 4 MG tablet therapy pack Follow Package Directions (Patient not taking: Reported on 3/17/2025) 21 tablet 0    methylPREDNISolone (MEDROL) 16 MG tablet Take 32 mg by mouth 12 hours before the procedure and repeat 32 mg by mouth 2 hours before the procedure  to prevent contrast reaction. (Patient not taking: Reported on 3/17/2025) 4 tablet 0       Allergies  Allergies   Allergen Reactions    Contrast Dye Other (See Comments)     Notes thyroid swelling after a contrast injection for a lumbar LUISA       Social History  Social History     Socioeconomic History    Marital status: Single     Spouse name: Not on file    Number of children: Not on file    Years of education: Not on file    Highest education level: Not on file   Occupational History    Not on file   Tobacco Use    Smoking status: Never    Smokeless tobacco: Never   Substance and Sexual Activity    Alcohol use: Yes     Alcohol/week: 9.0 standard drinks of alcohol     Types: 9 Standard drinks or equivalent per week    Drug use: Yes     Types: Marijuana     Comment: Vape or edibles    Sexual activity: Yes     Partners: Male     Birth control/protection: I.U.D.   Other Topics Concern    Parent/sibling w/ CABG, MI or angioplasty before 65F 55M? No   Social History Narrative    Not on file     Social Drivers of Health     Financial Resource Strain: Not on file   Food Insecurity: Not on file   Transportation Needs: Not on file   Physical Activity: Not on file   Stress: Not on file   Social Connections: Not on file   Interpersonal Safety: Not on file   Housing Stability: Not on file       Family History  Family History   Problem Relation Age of Onset    Cancer Mother         melanoma    Melanoma Mother     Osteoporosis Mother     Thyroid Disease Mother     Coronary Artery Disease Father          of heart attack at age 79    Hypertension Father     Prostate Cancer Father     Thyroid Disease Sister     Diabetes Maternal Grandmother     Cerebrovascular Disease Maternal Grandmother     Breast Cancer Maternal Grandmother     Depression Maternal Grandmother     Mental Illness Maternal Grandmother     Coronary Artery Disease Maternal Grandfather          of a heart attack in his 50s    Breast Cancer Paternal  "Grandmother     Substance Abuse Paternal Grandfather     Cancer Other         Maternal aunt has a hx of melanoma    Melanoma Other         maternal aunt    Skin Cancer No family hx of     Anesthesia Reaction No family hx of     Bleeding Disorder No family hx of     Clotting Disorder No family hx of        Review of Systems  The complete review of systems is negative other than noted in the HPI or here.   Anesthesia Evaluation   Pt has had prior anesthetic. Type: General.    History of anesthetic complications  - PONV.  Post op vomiting with halo procedure. No issues with left hip surgery.    ROS/MED HX  ENT/Pulmonary:  - neg pulmonary ROS  (-) tobacco use   Neurologic:  - neg neurologic ROS  (-) no seizures, no CVA and no TIA   Cardiovascular:       METS/Exercise Tolerance: 3 - Able to walk 1-2 blocks without stopping Comment: Patient currently limited secondary to hip pain   Hematologic:  - neg hematologic  ROS     Musculoskeletal:   (+)  arthritis,             GI/Hepatic:  - neg GI/hepatic ROS  (-) GERD   Renal/Genitourinary:  - neg Renal ROS     Endo:  - neg endo ROS     Psychiatric/Substance Use:  - neg psychiatric ROS     Infectious Disease:  - neg infectious disease ROS     Malignancy:  - neg malignancy ROS     Other: Comment: Acne  Hirsutism  nevi           /84 (BP Location: Right arm, Patient Position: Sitting, Cuff Size: Adult Regular)   Pulse 72   Temp 99  F (37.2  C) (Oral)   Resp 16   Ht 1.702 m (5' 7\")   Wt 78.7 kg (173 lb 9.6 oz)   LMP  (LMP Unknown)   SpO2 98%   Breastfeeding No   BMI 27.19 kg/m      Physical Exam   Constitutional: Awake, alert, cooperative, no apparent distress, and appears stated age.  Eyes: Pupils equal, round and reactive to light, extra ocular muscles intact, sclera clear, conjunctiva normal.  HENT: Normocephalic, oral pharynx with moist mucus membranes, good dentition. No goiter appreciated.   Respiratory: Clear to auscultation bilaterally, no crackles or " wheezing.  Cardiovascular: Regular rate and rhythm, normal S1 and S2, and no murmur noted.  Carotids +2, no bruits. No edema. Palpable pulses to radial  DP and PT arteries.   GI: Normal bowel sounds, soft, non-distended, non-tender, no masses palpated, no hepatosplenomegaly.    Lymph/Hematologic: No cervical lymphadenopathy and no supraclavicular lymphadenopathy.  Genitourinary:  defer  Skin: Warm and dry.  No rashes at anticipated surgical site.   Musculoskeletal: Full ROM of neck - hyper flexible. There is no redness, warmth, or swelling of the joints. Gross motor strength is normal.    Neurologic: Awake, alert, oriented to name, place and time. Cranial nerves II-XII are grossly intact. Gait is normal.   Neuropsychiatric: Calm, cooperative. Normal affect.     Prior Labs/Diagnostic Studies   All labs and imaging personally reviewed     EKG/ stress test - if available please see in ROS above         The patient's records and results personally reviewed by this provider.     Outside records reviewed from: Care Everywhere    LAB/DIAGNOSTIC STUDIES TODAY:       Latest Reference Range & Units 03/17/25 15:52   Sodium 135 - 145 mmol/L 137   Potassium 3.4 - 5.3 mmol/L 4.9   Chloride 98 - 107 mmol/L 102   Carbon Dioxide (CO2) 22 - 29 mmol/L 24   Urea Nitrogen 6.0 - 20.0 mg/dL 15.0   Creatinine 0.51 - 0.95 mg/dL 1.02 (H)   GFR Estimate >60 mL/min/1.73m2 71   Calcium 8.8 - 10.4 mg/dL 9.9   Anion Gap 7 - 15 mmol/L 11   Glucose 70 - 99 mg/dL 98   WBC 4.0 - 11.0 10e3/uL 9.1   Hemoglobin 11.7 - 15.7 g/dL 13.6   Hematocrit 35.0 - 47.0 % 40.9   Platelet Count 150 - 450 10e3/uL 363   RBC Count 3.80 - 5.20 10e6/uL 4.36   MCV 78 - 100 fL 94   MCH 26.5 - 33.0 pg 31.2   MCHC 31.5 - 36.5 g/dL 33.3   RDW 10.0 - 15.0 % 12.4   (H): Data is abnormally high        Assessment    Quiana Shepherd is a 39 year old female seen as a PAC referral for risk assessment and optimization for anesthesia.    Plan/Recommendations  Pt will be optimized for  "the proposed procedure.  See below for details on the assessment, risk, and preoperative recommendations    NEUROLOGY  - No history of TIA, CVA or seizure  - Chronic Pain - hold Voltaren for 24 hours   -Post Op delirium risk factors:  No risk identified    ENT  - No current airway concerns.  Will need to be reassessed day of surgery.  Mallampati: I  TM: > 3    CARDIAC  - No history of CAD, Hypertension, and Afib  - METS (Metabolic Equivalents)  Patient CANNOT perform 4 METS exercise             Total Score: 1    Functional Capacity: Unable to complete 4 METS      RCRI-Very low risk: Class 1 0.4% complication rate             Total Score: 0    The patient is limited secondary to her hip pain and uses a cane. She denies any cardiac symptoms and has no RCRI risk factors. No further testing indicated.     PULMONARY  - Obstructive Sleep Apnea  No current risk of obstructive sleep apnea   SEAN Low Risk             Total Score: 0      - Denies asthma or inhaler use  - Tobacco History    History   Smoking Status    Never   Smokeless Tobacco    Never   ~We discussed no vaping cannabis for 24 hours prior and no edible cannabis for 8 hours prior.     GI  PONV High Risk  Total Score: 4           1 AN PONV: Pt is Female    1 AN PONV: Patient is not a current smoker    1 AN PONV: Patient has history of PONV    1 AN PONV: Intended Post Op Opioids        /RENAL  - Baseline Creatinine  0.81      ENDOCRINE    - BMI: Estimated body mass index is 27.19 kg/m  as calculated from the following:    Height as of this encounter: 1.702 m (5' 7\").    Weight as of this encounter: 78.7 kg (173 lb 9.6 oz).  Overweight (BMI 25.0-29.9)  - No history of Diabetes Mellitus    HEME  VTE Low Risk 0.26%             Total Score: 0      - No history of abnormal bleeding or antiplatelet use.      MSK  Patient IS Frail             Total Score: 4    Frailty: Slower walking speed    Frailty: Decrease in strength    Frailty: Increased exhaustion    Frailty: " Overall lack of energy      We discussed PM&R referral but the patient is already following with PT and reports all of her symptoms are related to her hip. She does not feel a referral would be helpful at this time.     ~ Osteoarthritis of left hip joint due to dysplasia - procedure as above.     DERM  - Acne/ nevi/ hirsutism - continue medications but hold aldactone DOS.     Different anesthesia methods/types have been discussed with the patient, but they are aware that the final plan will be decided by the assigned anesthesia provider on the date of service.    The patient is optimized for their procedure. AVS with information on surgery time/arrival time, meds and NPO status given by nursing staff. No further diagnostic testing indicated.      On the day of service:     Prep time: 8 minutes  Visit time: 17 minutes  Documentation time: 10 minutes  ------------------------------------------  Total time: 35 minutes      Radha Calvert PA-C  Preoperative Assessment Center  [facility]  [facility]  Phone: [phone]  Fax: [phone]

## 2025-03-17 NOTE — PROGRESS NOTES
DISCHARGE  Reason for Discharge: Patient has met all goals.  No further expectation of progress.    Equipment Issued: None    Discharge Plan: Patient to continue home program.    Referring Provider:  Nii Gomez    Therapist Impression:   Collected strength numbers noted below.  Discussed post op exercises and performed STM.  Will follow up after surgery.    NEXT: STM and bed mobility, UE strengthening    PTRX: NA    GOALS: lifting/carrying, walking without a cane, gym program    Subjective:  Stairs are harder having more difficulty.  Core feels strong.  Glutes aren't as strong.    Objective:  Hip and Knee Strength   MMT Left Right   Hip Abduction pain/5 36/5   Hip Extension Pain`/5 39/5   Hip ER 26/5 43/5

## 2025-03-17 NOTE — PATIENT INSTRUCTIONS
Preparing for Your Surgery      Name:  Quiana Shepherd   MRN:  [id number]   :  1985   Today's Date:  3/17/2025       Arriving for surgery:  Surgery date:  25  Arrival time:  7:45 am       Please come to:       [facility] [address]   [facility]   [address] [place]  [address]     The Green Ramp for patients and visitors is beneath the [facility]. The parking facility entrance is at the intersection of [address] [place] [address] and [address]. Patients and visitors who self-park will receive the reduced hospital parking rate (no ticket validation needed).     [employer] parking, located at the [facility] main entrance on [address], is available Monday - Friday from 7 am to 3:30 pm.     Discounted parking pass options can be purchased from  attendants during business hours.     -Check in at the security desk in the [facility]   Lobby. They will direct you to the correct elevators.   -Proceed to the 3rd floor, Adult Surgery Waiting Lounge. [phone]     If you need directions, a wheelchair or escort please stop at the Information Desk in the lobby.  Inform the information person you are here for surgery; a wheelchair and escort to [address] will be provided.   An escort to the Adult Surgery Waiting Lounge will be provided. .    What can I eat or drink?  -  You may eat and drink normally up to 8 hours prior to arrival time. (Until 11:45 pm on 25)  -  You may have clear liquids until 2 hours prior to arrival time. (Until 5:45 am on 25)    Examples of clear liquids:  Water  Clear broth  Juices (apple, white grape, white cranberry  and cider) without pulp  Noncarbonated, powder based beverages  (lemonade and Kwan-Aid)  Sodas (Sprite, 7-Up, ginger ale and seltzer)  Coffee or tea (without milk or cream)  Gatorade    -  No  Alcohol or cannabis products for at least 24 hours before surgery.     OPTIMAL RECOVERY AFTER SURGERY        Begin hydrating yourself by drinking at least 8-10 glasses of clear liquids for 24 hours before surgery:      Suggested clear liquids:   Water    Clear Juices   Clear Broth   Non- carbonated beverages    (Crystal Light or Kwan Aid)   Sodas    (Sprite, 7 up, ginger ale, seltzer)   Gatorade              Drink clear liquids up until 4 hours before your surgery.       We would like you to purchase a drink such as Gatorade or Ensure Clear (not the milkshake type).  Drink this before bedtime and the morning of surgery drink between 8-10 ounces or until you feel hydrated.        Keeping well hydrated leads to your veins being plump, you wake up faster, and you are less likely to be nauseated. Start drinking water as soon as you can after surgery and advance to clear liquids and food as tolerated.  IV fluids contain salt, drinking fluids will minimize the amount of IV fluids you need and decrease the amount of salt you get.                 The most common reason for the patient to be readmitted is dehydration. Staying hydrated after you go home from the hospital is very important.  Ensure or Ensure Clear are good options to keep you hydrated.      Which medicines can I take?    Hold Ibuprofen (Advil, Motrin) for 3 day(s) before surgery--unless otherwise directed by surgeon.  Hold Naproxen (Aleve) for 4 days before surgery.    Do not use Diclofenac (Voltaren) for 24 hours before surgery.    -  DO NOT take these medications the day of surgery:   No creams or gel   Vitamin B-12 and D   Spironolactone     -  PLEASE TAKE these medications the day of surgery:   Acetaminophen (Tylenol) as needed       How do I prepare myself?  - Please take 2 showers (one the night prior to surgery and one the morning of surgery) using Scrubcare or Hibiclens soap.    Use this soap only from the neck to your toes. Avoid genital area       Leave the soap on your skin for one minute--then rinse thoroughly.      You may use your own shampoo and conditioner. No other hair products.   - Please remove all jewelry and body piercings.  - No lotions, deodorants or fragrance.  - No makeup or fingernail polish.   - Bring your ID and insurance card.    -For patients being admitted to the [facility]  Family members are to take the patient belongings with them and place them in the lockers provided in the Family Lounge.  Please limit the items you bring to 1 bag as the lockers are small.      -If you use a CPAP machine, please bring the CPAP machine, tubing, and mask to hospital.    -If you have a Deep Brain Stimulator, Spinal Cord Stimulator, or any Neuro Stimulator device---you must bring the remote control to the hospital.      ALL PATIENTS GOING HOME THE SAME DAY OF SURGERY ARE REQUIRED TO HAVE A RESPONSIBLE ADULT TO DRIVE AND BE IN ATTENDANCE WITH THEM FOR 24 HOURS FOLLOWING SURGERY.    Covid testing policy as of 12/06/2022  Your surgeon will notify and schedule you for a COVID test if one is needed before surgery--please direct any questions or COVID symptoms to your surgeon      Questions or Concerns:    - For any questions regarding the day of surgery or your hospital stay, please contact the Pre Admission Nursing Office at [phone].       - If you have health changes between today and your surgery, please call your surgeon.       - For questions after surgery, please call your surgeons office.           Current Visitor Guidelines    2 adult visitors for adult patients in the pre op area    If additional visitors come (beyond a patient care attendant or a group home caregiver), the additional visitors will be asked to wait in the main lobby of the hospital    Visiting hours: 8 a.m. to 8:30 p.m.    Patients confirmed or suspected to have symptoms of COVID 19 or flu:     No visitors allowed for adult patients.   Children (under age 18) can have 1  named visitor.     People who are sick or showing symptoms of COVID 19 or flu:    Are not allowed to visit patients--we can only make exceptions in special situations.       Please follow these guidelines for your visit:          Please maintain social distance          Masking is optional--however at times you may be asked to wear a mask for the safety of yourself and others     Clean your hands with alcohol hand . Do this when you arrive at and leave the building and patient room,    And again after you touch your mask or anything in the room.     Go directly to and from the room you are visiting.     Stay in the patient s room during your visit. Limit going to other places in the hospital as much as possible     Leave bags and jackets at home or in the car.     For everyone s health, please don t come and go during your visit. That includes for smoking   during your visit.

## 2025-03-21 ENCOUNTER — TELEPHONE (OUTPATIENT)
Dept: ORTHOPEDICS | Facility: CLINIC | Age: 40
End: 2025-03-21
Payer: COMMERCIAL

## 2025-03-21 DIAGNOSIS — Z96.642 STATUS POST TOTAL REPLACEMENT OF LEFT HIP: Primary | ICD-10-CM

## 2025-03-21 NOTE — TELEPHONE ENCOUNTER
Called and Isacc Blanco- Partner [phone].  Physical Therapy [facility].   Pre-op done. Medications reviewed.     Teaching Flowsheet     Visit Type: Telephone    Time Start: 11:08  Time End: 11:25  Total Time Spent: 18 min.    Relevant Diagnosis: Total hip  Teaching Topic: Surgery teaching    Surgeon: Dr. Mohamud  Location of Surgery (known or anticipated): [facility]  Type of Anesthesia: Choice    Pertinent Medical History: No Pertinent Medical History  Were medical conditions reviewed and appropriate for location? Yes  BMI: Normal BMI    Physical Therapy [facility]    Person(s) involved in teaching:   Patient  : No.   Verified Patient's Phone Number: YES: see chart    Caregiver//  Name: Isacc Blanco- Partner [phone]  Phone Number:    Relationship: partner  Consent to Communicate on file: Yes  Authorization to Share Protected Health Information- Person to person communication signed by patient and authorized the following person or people:      Motivation Level:  Asks Questions: Yes  Eager to Learn: Yes  Cooperative: Yes  Receptive (willing/able to accept information): Yes  Any cultural factors/Tenriism beliefs that may influence understanding or compliance? No     Patient demonstrates understanding of the following:  Reason for the appointment, diagnosis and treatment plan: Yes  Knowledge of proper use of medications and conditions for which they are ordered (with special attention to potential side effects or drug interactions): Yes  Which situations necessitate calling provider and whom to contact: Yes     Teaching Concerns Addressed:   Proper use and care of medical equip, care aids, etc.: Yes  Nutritional needs and diet plan: Yes  Pain management techniques: NA  Wound Care: Yes  How and/when to access community resources: Yes  Need for pre-op with in 30 days: YES, will be done with PCP. I asked them to ensure they go over their daily  medications during this visit and discuss what medications need to be stopped before surgery and when. If you are doing a pre-op with your PCP and they are not within the [facility], I ask them to fax it to our pre-op office. Patient verbalized understanding.       Does patient have difficulty getting a ride to appointments (post-ops, PT/OT): No  Patient's plan after discharge: home with family or spouse     Instructional Materials Used/Given:  two bottles of chlorhexidine soap and a surgery packet given to patient in clinic. Instructed patient to buy or get two 8 ounces bottles of antiseptic surgical soap called 4% CHG. Common name brand of this soap are Hibiclens and Exidine. I told them they can find this at their local pharmacy, clinic or retail store. If they have trouble finding it, I told them to ask their pharmacist to help them find a substitute.   - Important Contact Info/Phone Numbers: emphasizing clinic number [phone] and after hours number [phone]  - Map/location of surgery and follow-up appointments  - Showering instructions  - Stoplight Tool     -Next step: schedule a surgery date.    Roxane Robertson RN

## 2025-03-25 NOTE — PROGRESS NOTES
Ortho Navigator Note      Pre-op Date 3/17/25     Medical Clearance  Cleared      Labs Stable      COVID Test Date No longer indicated      Skin  Intact      Activity: Ambulates independently with straight cane      Equipment Need Patient HAS a walker, transfer bench, leg lifeter, toilet riser, and grabber for discharge. Defer to PT/OT for recs.       Meds to Hold Held all supplements 14 days prior to surgery  Hold Ibuprofen (Advil, Motrin) for 3 day(s) before surgery--unless otherwise directed by surgeon.  Hold Naproxen (Aleve) for 4 days before surgery.     Do not use Diclofenac (Voltaren) for 24 hours before surgery.     -  DO NOT take these medications the day of surgery:             No creams or gel             Vitamin B-12 and D             Spironolactone   * Medication recommendations are not intended to be exhaustive; they are limited to common medications that are potentially dangerous if incorrectly managed   NPO Instructions  Instructed to stop solids 8 hr prior to arrival and clears 2 hr prior to arrival.       Pre-op Joint Education Complete? Complete    Discharge Plan Patient has plan to discharge home on morning of DOS as Fast Track. Isacc will remain at hospital on DOS to participate in therapy and discharge education. They will then transport patient home    /Transportation Isacc will be  and transportation.  is physically able to care for patient.      Barriers to Discharge No barriers to discharge.      Additional Info/   Special Needs : Patient had no unanswered questions or concerns.           03/25/25 1515   Discharge Planning   Patient/Family Anticipates Transition to home with family   Concerns to be Addressed no discharge needs identified   Living Arrangements   People in Home significant other  (Isacc)   Type of Residence Private Residence   Is your private residence a single family home or apartment? Single family home   Number of Stairs, Within Home, Primary two   Stair  Railings, Within Home, Primary railings safe and in good condition   Which level? Main Level   Bathroom Shower/Tub Tub/Shower unit   Equipment Currently Used at Home none;tub bench  (Transfer bench)   Support System   Support Systems Spouse/Significant Other   Do you have someone available to stay with you one or two nights once you are home? Yes   Medical Clearance   Date of Physical 03/17/25   Clinic Name [facility]   It is recommended that you call and check with any specialty providers before surgery to see if you need surgical clearance.  Do you see any specialty providers outside of your primary care provider? No   Blood   Known Bleeding Disorder or Coagulopathy No   Does the patient have any Mu-ism/cultural preferences related to blood products? No   Education   Has the patient scheduled or completed pre-op total joint education, either in class or online, in the last 12 months? Yes   Patient attended total joint pre-op class/received pre-op teaching  online   Falls Risk   Has the patient been identified as a high falls risk before surgery? (fallen in the last 6 months, history of falls, unsteady gait, or balance issues) No   Did an order get placed to attend outpatient pre-surgery balance evaluation? No   Relationship/Environment   Name(s) of People in Home Isacc

## 2025-04-07 DIAGNOSIS — Z96.642 STATUS POST TOTAL REPLACEMENT OF LEFT HIP: Primary | ICD-10-CM

## 2025-04-09 ENCOUNTER — APPOINTMENT (OUTPATIENT)
Dept: GENERAL RADIOLOGY | Facility: CLINIC | Age: 40
End: 2025-04-09
Attending: PHYSICIAN ASSISTANT
Payer: COMMERCIAL

## 2025-04-09 ENCOUNTER — APPOINTMENT (OUTPATIENT)
Dept: PHYSICAL THERAPY | Facility: CLINIC | Age: 40
End: 2025-04-09
Attending: ORTHOPAEDIC SURGERY
Payer: COMMERCIAL

## 2025-04-09 ENCOUNTER — HOSPITAL ENCOUNTER (OUTPATIENT)
Facility: CLINIC | Age: 40
Discharge: HOME | End: 2025-04-09
Attending: ORTHOPAEDIC SURGERY | Admitting: ORTHOPAEDIC SURGERY
Payer: COMMERCIAL

## 2025-04-09 ENCOUNTER — ANESTHESIA (OUTPATIENT)
Dept: SURGERY | Facility: CLINIC | Age: 40
End: 2025-04-09
Payer: COMMERCIAL

## 2025-04-09 VITALS
RESPIRATION RATE: 18 BRPM | DIASTOLIC BLOOD PRESSURE: 67 MMHG | BODY MASS INDEX: 26.61 KG/M2 | HEART RATE: 71 BPM | WEIGHT: 169.53 LBS | HEIGHT: 67 IN | SYSTOLIC BLOOD PRESSURE: 113 MMHG | OXYGEN SATURATION: 98 % | TEMPERATURE: 97.6 F

## 2025-04-09 DIAGNOSIS — L68.0 HIRSUTISM: ICD-10-CM

## 2025-04-09 DIAGNOSIS — Z96.642 S/P TOTAL LEFT HIP ARTHROPLASTY: Primary | ICD-10-CM

## 2025-04-09 DIAGNOSIS — M54.16 LUMBAR RADICULAR PAIN: ICD-10-CM

## 2025-04-09 DIAGNOSIS — L70.0 ACNE VULGARIS: ICD-10-CM

## 2025-04-09 PROCEDURE — 258N000003 HC RX IP 258 OP 636: Performed by: PHYSICIAN ASSISTANT

## 2025-04-09 PROCEDURE — 360N000077 HC SURGERY LEVEL 4, PER MIN: Performed by: ORTHOPAEDIC SURGERY

## 2025-04-09 PROCEDURE — 27130 TOTAL HIP ARTHROPLASTY: CPT | Mod: LT | Performed by: ORTHOPAEDIC SURGERY

## 2025-04-09 PROCEDURE — 710N000010 HC RECOVERY PHASE 1, LEVEL 2, PER MIN: Performed by: ORTHOPAEDIC SURGERY

## 2025-04-09 PROCEDURE — 999N000065 XR PELVIS AND HIP PORTABLE LEFT 1 VIEW

## 2025-04-09 PROCEDURE — 250N000009 HC RX 250: Performed by: NURSE ANESTHETIST, CERTIFIED REGISTERED

## 2025-04-09 PROCEDURE — 250N000011 HC RX IP 250 OP 636: Mod: JW | Performed by: ANESTHESIOLOGY

## 2025-04-09 PROCEDURE — 250N000011 HC RX IP 250 OP 636: Performed by: PHYSICIAN ASSISTANT

## 2025-04-09 PROCEDURE — 710N000012 HC RECOVERY PHASE 2, PER MINUTE: Performed by: ORTHOPAEDIC SURGERY

## 2025-04-09 PROCEDURE — C1776 JOINT DEVICE (IMPLANTABLE): HCPCS | Performed by: ORTHOPAEDIC SURGERY

## 2025-04-09 PROCEDURE — 250N000011 HC RX IP 250 OP 636

## 2025-04-09 PROCEDURE — 97116 GAIT TRAINING THERAPY: CPT | Mod: GP

## 2025-04-09 PROCEDURE — 250N000009 HC RX 250: Performed by: ORTHOPAEDIC SURGERY

## 2025-04-09 PROCEDURE — 999N000141 HC STATISTIC PRE-PROCEDURE NURSING ASSESSMENT: Performed by: ORTHOPAEDIC SURGERY

## 2025-04-09 PROCEDURE — 250N000013 HC RX MED GY IP 250 OP 250 PS 637: Performed by: PHYSICIAN ASSISTANT

## 2025-04-09 PROCEDURE — 258N000003 HC RX IP 258 OP 636: Performed by: NURSE ANESTHETIST, CERTIFIED REGISTERED

## 2025-04-09 PROCEDURE — 250N000011 HC RX IP 250 OP 636: Mod: JZ | Performed by: PHYSICIAN ASSISTANT

## 2025-04-09 PROCEDURE — 250N000011 HC RX IP 250 OP 636: Performed by: NURSE ANESTHETIST, CERTIFIED REGISTERED

## 2025-04-09 PROCEDURE — 97161 PT EVAL LOW COMPLEX 20 MIN: CPT | Mod: GP

## 2025-04-09 PROCEDURE — 272N000001 HC OR GENERAL SUPPLY STERILE: Performed by: ORTHOPAEDIC SURGERY

## 2025-04-09 PROCEDURE — C1713 ANCHOR/SCREW BN/BN,TIS/BN: HCPCS | Performed by: ORTHOPAEDIC SURGERY

## 2025-04-09 PROCEDURE — 97530 THERAPEUTIC ACTIVITIES: CPT | Mod: GP

## 2025-04-09 PROCEDURE — 258N000001 HC RX 258: Performed by: ORTHOPAEDIC SURGERY

## 2025-04-09 PROCEDURE — 250N000011 HC RX IP 250 OP 636: Mod: JZ | Performed by: ANESTHESIOLOGY

## 2025-04-09 PROCEDURE — 370N000017 HC ANESTHESIA TECHNICAL FEE, PER MIN: Performed by: ORTHOPAEDIC SURGERY

## 2025-04-09 PROCEDURE — 250N000013 HC RX MED GY IP 250 OP 250 PS 637: Performed by: ANESTHESIOLOGY

## 2025-04-09 DEVICE — R3 3 HOLE ACETABULAR SHELL 48MM
Type: IMPLANTABLE DEVICE | Site: HIP | Status: FUNCTIONAL
Brand: R3 ACETABULAR

## 2025-04-09 DEVICE — POLARSTEM STANDARD NON-CEMENTED                                    WITH TI/HA 2
Type: IMPLANTABLE DEVICE | Site: HIP | Status: FUNCTIONAL
Brand: POLARSTEM

## 2025-04-09 DEVICE — REFLECTION SPHERICAL HEAD SCREW 20MM
Type: IMPLANTABLE DEVICE | Site: HIP | Status: FUNCTIONAL
Brand: REFLECTION

## 2025-04-09 DEVICE — R3 0 DEGREE XLPE ACETABULAR LINER                                    32MM ID X OD 48MM
Type: IMPLANTABLE DEVICE | Site: HIP | Status: FUNCTIONAL
Brand: R3

## 2025-04-09 DEVICE — REFLECTION SPHERICAL HEAD SCREW 40MM
Type: IMPLANTABLE DEVICE | Site: HIP | Status: FUNCTIONAL
Brand: REFLECTION

## 2025-04-09 DEVICE — OXINIUM FEMORAL HEAD 12/14 TAPER                                    32MM +4
Type: IMPLANTABLE DEVICE | Site: HIP | Status: FUNCTIONAL
Brand: OXINIUM

## 2025-04-09 RX ORDER — ACETAMINOPHEN 325 MG/1
650 TABLET ORAL EVERY 4 HOURS PRN
Qty: 100 TABLET | Refills: 0 | Status: SHIPPED | OUTPATIENT
Start: 2025-04-09

## 2025-04-09 RX ORDER — AMOXICILLIN 250 MG
1-2 CAPSULE ORAL 2 TIMES DAILY PRN
Qty: 30 TABLET | Refills: 0 | Status: SHIPPED | OUTPATIENT
Start: 2025-04-09 | End: 2025-04-17

## 2025-04-09 RX ORDER — OXYCODONE HYDROCHLORIDE 5 MG/1
10 TABLET ORAL EVERY 4 HOURS PRN
Status: CANCELLED | OUTPATIENT
Start: 2025-04-09

## 2025-04-09 RX ORDER — ONDANSETRON 2 MG/ML
INJECTION INTRAMUSCULAR; INTRAVENOUS PRN
Status: DISCONTINUED | OUTPATIENT
Start: 2025-04-09 | End: 2025-04-09

## 2025-04-09 RX ORDER — SODIUM CHLORIDE, SODIUM LACTATE, POTASSIUM CHLORIDE, CALCIUM CHLORIDE 600; 310; 30; 20 MG/100ML; MG/100ML; MG/100ML; MG/100ML
INJECTION, SOLUTION INTRAVENOUS CONTINUOUS
Status: DISCONTINUED | OUTPATIENT
Start: 2025-04-09 | End: 2025-04-09 | Stop reason: HOSPADM

## 2025-04-09 RX ORDER — HYDROMORPHONE HYDROCHLORIDE 1 MG/ML
0.4 INJECTION, SOLUTION INTRAMUSCULAR; INTRAVENOUS; SUBCUTANEOUS EVERY 5 MIN PRN
Status: DISCONTINUED | OUTPATIENT
Start: 2025-04-09 | End: 2025-04-09 | Stop reason: HOSPADM

## 2025-04-09 RX ORDER — ONDANSETRON 4 MG/1
4 TABLET, ORALLY DISINTEGRATING ORAL EVERY 30 MIN PRN
Status: DISCONTINUED | OUTPATIENT
Start: 2025-04-09 | End: 2025-04-09 | Stop reason: HOSPADM

## 2025-04-09 RX ORDER — HYDROMORPHONE HYDROCHLORIDE 1 MG/ML
0.2 INJECTION, SOLUTION INTRAMUSCULAR; INTRAVENOUS; SUBCUTANEOUS EVERY 5 MIN PRN
Status: DISCONTINUED | OUTPATIENT
Start: 2025-04-09 | End: 2025-04-09 | Stop reason: HOSPADM

## 2025-04-09 RX ORDER — HYDROMORPHONE HYDROCHLORIDE 1 MG/ML
0.4 INJECTION, SOLUTION INTRAMUSCULAR; INTRAVENOUS; SUBCUTANEOUS
Status: CANCELLED | OUTPATIENT
Start: 2025-04-09

## 2025-04-09 RX ORDER — AMOXICILLIN 250 MG
1 CAPSULE ORAL 2 TIMES DAILY
Status: CANCELLED | OUTPATIENT
Start: 2025-04-09

## 2025-04-09 RX ORDER — SODIUM CHLORIDE, SODIUM LACTATE, POTASSIUM CHLORIDE, CALCIUM CHLORIDE 600; 310; 30; 20 MG/100ML; MG/100ML; MG/100ML; MG/100ML
INJECTION, SOLUTION INTRAVENOUS CONTINUOUS
Status: CANCELLED | OUTPATIENT
Start: 2025-04-09

## 2025-04-09 RX ORDER — FENTANYL CITRATE 50 UG/ML
25 INJECTION, SOLUTION INTRAMUSCULAR; INTRAVENOUS EVERY 5 MIN PRN
Status: DISCONTINUED | OUTPATIENT
Start: 2025-04-09 | End: 2025-04-09 | Stop reason: HOSPADM

## 2025-04-09 RX ORDER — ONDANSETRON 4 MG/1
4 TABLET, ORALLY DISINTEGRATING ORAL EVERY 8 HOURS PRN
Qty: 20 TABLET | Refills: 0 | Status: ON HOLD | OUTPATIENT
Start: 2025-04-09 | End: 2025-04-24

## 2025-04-09 RX ORDER — NALOXONE HYDROCHLORIDE 0.4 MG/ML
0.1 INJECTION, SOLUTION INTRAMUSCULAR; INTRAVENOUS; SUBCUTANEOUS
Status: DISCONTINUED | OUTPATIENT
Start: 2025-04-09 | End: 2025-04-09 | Stop reason: HOSPADM

## 2025-04-09 RX ORDER — SODIUM CHLORIDE, SODIUM LACTATE, POTASSIUM CHLORIDE, CALCIUM CHLORIDE 600; 310; 30; 20 MG/100ML; MG/100ML; MG/100ML; MG/100ML
INJECTION, SOLUTION INTRAVENOUS CONTINUOUS PRN
Status: DISCONTINUED | OUTPATIENT
Start: 2025-04-09 | End: 2025-04-09

## 2025-04-09 RX ORDER — ACETAMINOPHEN 325 MG/1
975 TABLET ORAL ONCE
Status: COMPLETED | OUTPATIENT
Start: 2025-04-09 | End: 2025-04-09

## 2025-04-09 RX ORDER — OXYCODONE HYDROCHLORIDE 5 MG/1
5-10 TABLET ORAL EVERY 4 HOURS PRN
Qty: 30 TABLET | Refills: 0 | Status: SHIPPED | OUTPATIENT
Start: 2025-04-09

## 2025-04-09 RX ORDER — CEFAZOLIN SODIUM/WATER 2 G/20 ML
2 SYRINGE (ML) INTRAVENOUS ONCE
Status: COMPLETED | OUTPATIENT
Start: 2025-04-09 | End: 2025-04-09

## 2025-04-09 RX ORDER — ONDANSETRON 2 MG/ML
4 INJECTION INTRAMUSCULAR; INTRAVENOUS EVERY 30 MIN PRN
Status: DISCONTINUED | OUTPATIENT
Start: 2025-04-09 | End: 2025-04-09 | Stop reason: HOSPADM

## 2025-04-09 RX ORDER — LIDOCAINE 40 MG/G
CREAM TOPICAL
Status: CANCELLED | OUTPATIENT
Start: 2025-04-09

## 2025-04-09 RX ORDER — METHOCARBAMOL 500 MG/1
500 TABLET, FILM COATED ORAL EVERY 6 HOURS PRN
Status: CANCELLED | OUTPATIENT
Start: 2025-04-09

## 2025-04-09 RX ORDER — ONDANSETRON 2 MG/ML
4 INJECTION INTRAMUSCULAR; INTRAVENOUS EVERY 6 HOURS PRN
Status: CANCELLED | OUTPATIENT
Start: 2025-04-09

## 2025-04-09 RX ORDER — KETOROLAC TROMETHAMINE 30 MG/ML
15 INJECTION, SOLUTION INTRAMUSCULAR; INTRAVENOUS
Status: DISCONTINUED | OUTPATIENT
Start: 2025-04-09 | End: 2025-04-09 | Stop reason: HOSPADM

## 2025-04-09 RX ORDER — FENTANYL CITRATE-0.9 % NACL/PF 10 MCG/ML
PLASTIC BAG, INJECTION (ML) INTRAVENOUS PRN
Status: DISCONTINUED | OUTPATIENT
Start: 2025-04-09 | End: 2025-04-09

## 2025-04-09 RX ORDER — PROPOFOL 10 MG/ML
INJECTION, EMULSION INTRAVENOUS CONTINUOUS PRN
Status: DISCONTINUED | OUTPATIENT
Start: 2025-04-09 | End: 2025-04-09

## 2025-04-09 RX ORDER — PROPOFOL 10 MG/ML
INJECTION, EMULSION INTRAVENOUS PRN
Status: DISCONTINUED | OUTPATIENT
Start: 2025-04-09 | End: 2025-04-09

## 2025-04-09 RX ORDER — OXYCODONE HYDROCHLORIDE 5 MG/1
5 TABLET ORAL EVERY 4 HOURS PRN
Status: CANCELLED | OUTPATIENT
Start: 2025-04-09

## 2025-04-09 RX ORDER — EPHEDRINE SULFATE 50 MG/ML
INJECTION, SOLUTION INTRAMUSCULAR; INTRAVENOUS; SUBCUTANEOUS PRN
Status: DISCONTINUED | OUTPATIENT
Start: 2025-04-09 | End: 2025-04-09

## 2025-04-09 RX ORDER — MAGNESIUM HYDROXIDE 1200 MG/15ML
LIQUID ORAL PRN
Status: DISCONTINUED | OUTPATIENT
Start: 2025-04-09 | End: 2025-04-09 | Stop reason: HOSPADM

## 2025-04-09 RX ORDER — ASPIRIN 81 MG/1
81 TABLET ORAL 2 TIMES DAILY
Status: CANCELLED | OUTPATIENT
Start: 2025-04-10

## 2025-04-09 RX ORDER — HYDROMORPHONE HYDROCHLORIDE 1 MG/ML
0.2 INJECTION, SOLUTION INTRAMUSCULAR; INTRAVENOUS; SUBCUTANEOUS
Status: CANCELLED | OUTPATIENT
Start: 2025-04-09

## 2025-04-09 RX ORDER — CEPHALEXIN 500 MG/1
500 CAPSULE ORAL ONCE
Qty: 1 CAPSULE | Refills: 0 | Status: SHIPPED | OUTPATIENT
Start: 2025-04-10 | End: 2025-04-10

## 2025-04-09 RX ORDER — FENTANYL CITRATE 50 UG/ML
50 INJECTION, SOLUTION INTRAMUSCULAR; INTRAVENOUS EVERY 5 MIN PRN
Status: DISCONTINUED | OUTPATIENT
Start: 2025-04-09 | End: 2025-04-09 | Stop reason: HOSPADM

## 2025-04-09 RX ORDER — DEXAMETHASONE SODIUM PHOSPHATE 4 MG/ML
4 INJECTION, SOLUTION INTRA-ARTICULAR; INTRALESIONAL; INTRAMUSCULAR; INTRAVENOUS; SOFT TISSUE
Status: DISCONTINUED | OUTPATIENT
Start: 2025-04-09 | End: 2025-04-09 | Stop reason: HOSPADM

## 2025-04-09 RX ORDER — TRANEXAMIC ACID 650 MG/1
1950 TABLET ORAL ONCE
Status: COMPLETED | OUTPATIENT
Start: 2025-04-09 | End: 2025-04-09

## 2025-04-09 RX ORDER — OXYCODONE HYDROCHLORIDE 5 MG/1
5 TABLET ORAL
Status: DISCONTINUED | OUTPATIENT
Start: 2025-04-09 | End: 2025-04-09 | Stop reason: HOSPADM

## 2025-04-09 RX ORDER — ACETAMINOPHEN 325 MG/1
975 TABLET ORAL EVERY 8 HOURS
Status: DISCONTINUED | OUTPATIENT
Start: 2025-04-09 | End: 2025-04-09 | Stop reason: HOSPADM

## 2025-04-09 RX ORDER — DEXAMETHASONE SODIUM PHOSPHATE 4 MG/ML
INJECTION, SOLUTION INTRA-ARTICULAR; INTRALESIONAL; INTRAMUSCULAR; INTRAVENOUS; SOFT TISSUE PRN
Status: DISCONTINUED | OUTPATIENT
Start: 2025-04-09 | End: 2025-04-09

## 2025-04-09 RX ORDER — CEFAZOLIN SODIUM 1 G/3ML
1 INJECTION, POWDER, FOR SOLUTION INTRAMUSCULAR; INTRAVENOUS EVERY 8 HOURS
Status: DISCONTINUED | OUTPATIENT
Start: 2025-04-09 | End: 2025-04-09 | Stop reason: HOSPADM

## 2025-04-09 RX ORDER — ASPIRIN 81 MG/1
81 TABLET ORAL 2 TIMES DAILY
Qty: 60 TABLET | Refills: 0 | Status: SHIPPED | OUTPATIENT
Start: 2025-04-09

## 2025-04-09 RX ORDER — OXYCODONE HYDROCHLORIDE 5 MG/1
10 TABLET ORAL
Status: COMPLETED | OUTPATIENT
Start: 2025-04-09 | End: 2025-04-09

## 2025-04-09 RX ORDER — BISACODYL 10 MG
10 SUPPOSITORY, RECTAL RECTAL DAILY PRN
Status: CANCELLED | OUTPATIENT
Start: 2025-04-09

## 2025-04-09 RX ORDER — BUPIVACAINE HYDROCHLORIDE 7.5 MG/ML
INJECTION, SOLUTION INTRASPINAL
Status: COMPLETED | OUTPATIENT
Start: 2025-04-09 | End: 2025-04-09

## 2025-04-09 RX ORDER — ONDANSETRON 4 MG/1
4 TABLET, ORALLY DISINTEGRATING ORAL EVERY 6 HOURS PRN
Status: CANCELLED | OUTPATIENT
Start: 2025-04-09

## 2025-04-09 RX ORDER — POLYETHYLENE GLYCOL 3350 17 G/17G
17 POWDER, FOR SOLUTION ORAL DAILY
Status: CANCELLED | OUTPATIENT
Start: 2025-04-10

## 2025-04-09 RX ORDER — CEFAZOLIN SODIUM/WATER 2 G/20 ML
2 SYRINGE (ML) INTRAVENOUS SEE ADMIN INSTRUCTIONS
Status: DISCONTINUED | OUTPATIENT
Start: 2025-04-09 | End: 2025-04-09 | Stop reason: HOSPADM

## 2025-04-09 RX ORDER — CEFAZOLIN SODIUM/WATER 2 G/20 ML
2 SYRINGE (ML) INTRAVENOUS
Status: COMPLETED | OUTPATIENT
Start: 2025-04-09 | End: 2025-04-09

## 2025-04-09 RX ADMIN — EPHEDRINE SULFATE 5 MG: 5 INJECTION INTRAVENOUS at 10:46

## 2025-04-09 RX ADMIN — EPHEDRINE SULFATE 5 MG: 5 INJECTION INTRAVENOUS at 11:52

## 2025-04-09 RX ADMIN — PROPOFOL 40 MG: 10 INJECTION, EMULSION INTRAVENOUS at 10:38

## 2025-04-09 RX ADMIN — EPHEDRINE SULFATE 5 MG: 5 INJECTION INTRAVENOUS at 10:58

## 2025-04-09 RX ADMIN — FENTANYL CITRATE 25 MCG: 50 INJECTION, SOLUTION INTRAMUSCULAR; INTRAVENOUS at 12:58

## 2025-04-09 RX ADMIN — Medication 100 MCG: at 10:39

## 2025-04-09 RX ADMIN — MIDAZOLAM 2 MG: 1 INJECTION INTRAMUSCULAR; INTRAVENOUS at 10:17

## 2025-04-09 RX ADMIN — HYDROMORPHONE HYDROCHLORIDE 0.2 MG: 1 INJECTION, SOLUTION INTRAMUSCULAR; INTRAVENOUS; SUBCUTANEOUS at 13:37

## 2025-04-09 RX ADMIN — Medication 2 G: at 10:30

## 2025-04-09 RX ADMIN — ONDANSETRON 4 MG: 2 INJECTION INTRAMUSCULAR; INTRAVENOUS at 17:33

## 2025-04-09 RX ADMIN — ACETAMINOPHEN 975 MG: 325 TABLET, FILM COATED ORAL at 08:38

## 2025-04-09 RX ADMIN — FENTANYL CITRATE 25 MCG: 50 INJECTION, SOLUTION INTRAMUSCULAR; INTRAVENOUS at 12:49

## 2025-04-09 RX ADMIN — SODIUM CHLORIDE, SODIUM LACTATE, POTASSIUM CHLORIDE, AND CALCIUM CHLORIDE: .6; .31; .03; .02 INJECTION, SOLUTION INTRAVENOUS at 10:17

## 2025-04-09 RX ADMIN — SODIUM CHLORIDE, SODIUM LACTATE, POTASSIUM CHLORIDE, AND CALCIUM CHLORIDE: .6; .31; .03; .02 INJECTION, SOLUTION INTRAVENOUS at 12:12

## 2025-04-09 RX ADMIN — EPHEDRINE SULFATE 5 MG: 5 INJECTION INTRAVENOUS at 11:13

## 2025-04-09 RX ADMIN — EPHEDRINE SULFATE 5 MG: 5 INJECTION INTRAVENOUS at 10:48

## 2025-04-09 RX ADMIN — Medication 2 G: at 16:54

## 2025-04-09 RX ADMIN — Medication 100 MCG: at 11:54

## 2025-04-09 RX ADMIN — ONDANSETRON 4 MG: 2 INJECTION INTRAMUSCULAR; INTRAVENOUS at 10:25

## 2025-04-09 RX ADMIN — OXYCODONE HYDROCHLORIDE 10 MG: 5 TABLET ORAL at 13:41

## 2025-04-09 RX ADMIN — PROPOFOL 10 MG: 10 INJECTION, EMULSION INTRAVENOUS at 11:30

## 2025-04-09 RX ADMIN — TRANEXAMIC ACID 1950 MG: 650 TABLET ORAL at 08:39

## 2025-04-09 RX ADMIN — BUPIVACAINE HYDROCHLORIDE IN DEXTROSE 1.6 ML: 7.5 INJECTION, SOLUTION SUBARACHNOID at 10:20

## 2025-04-09 RX ADMIN — FENTANYL CITRATE 25 MCG: 50 INJECTION, SOLUTION INTRAMUSCULAR; INTRAVENOUS at 13:06

## 2025-04-09 RX ADMIN — Medication 100 MCG: at 10:58

## 2025-04-09 RX ADMIN — PROPOFOL 50 MCG/KG/MIN: 10 INJECTION, EMULSION INTRAVENOUS at 10:25

## 2025-04-09 RX ADMIN — DEXAMETHASONE SODIUM PHOSPHATE 4 MG: 4 INJECTION, SOLUTION INTRAMUSCULAR; INTRAVENOUS at 10:47

## 2025-04-09 RX ADMIN — PROPOFOL 20 MG: 10 INJECTION, EMULSION INTRAVENOUS at 11:26

## 2025-04-09 RX ADMIN — FENTANYL CITRATE 25 MCG: 50 INJECTION, SOLUTION INTRAMUSCULAR; INTRAVENOUS at 13:24

## 2025-04-09 RX ADMIN — ACETAMINOPHEN 975 MG: 325 TABLET, FILM COATED ORAL at 15:39

## 2025-04-09 ASSESSMENT — ACTIVITIES OF DAILY LIVING (ADL)
ADLS_ACUITY_SCORE: 29

## 2025-04-09 NOTE — ANESTHESIA POSTPROCEDURE EVALUATION
Patient: Quiana Shepherd    Procedure: Procedure(s):  ARTHROPLASTY, HIP, TOTAL       Anesthesia Type:  Spinal    Note:  Disposition: Outpatient   Postop Pain Control: Uneventful            Sign Out: Well controlled pain   PONV: No   Neuro/Psych: Uneventful            Sign Out: Acceptable/Baseline neuro status   Airway/Respiratory: Uneventful            Sign Out: Acceptable/Baseline resp. status   CV/Hemodynamics: Uneventful            Sign Out: Acceptable CV status; No obvious hypovolemia; No obvious fluid overload   Other NRE: NONE   DID A NON-ROUTINE EVENT OCCUR? No           Last vitals:  Vitals Value Taken Time   /72 04/09/25 1315   Temp 36.6  C (97.9  F) 04/09/25 1245   Pulse 66 04/09/25 1315   Resp 21 04/09/25 1315   SpO2 99 % 04/09/25 1315   Vitals shown include unfiled device data.    Electronically Signed By: Jackson Morejon MD, MD  April 9, 2025  1:16 PM

## 2025-04-09 NOTE — BRIEF OP NOTE
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 Item Serial No.  Lot No. LRB No. Used Action   IMP LINER S&N ACET R3 XLPE [id number] 0DEG [id number] - [id number] Total Joint Component/Insert IMP LINER S&N ACET R3 XLPE [id number] [id number]  [employer] [id number] Left 1 Implanted   IMP SHELL SNR ACET R3 3H 48MM [id number] - [id number] Total Joint Component/Insert IMP SHELL SNR ACET R3 3H 48MM [id number]  [employer]-R [id number] Left 1 Implanted   IMP SCR ACET SNN SPHERICAL HEAD 6.5X40MM [id number] - [id number] Metallic Hardware/[place] IMP SCR ACET SNN SPHERICAL HEAD 6.5X40MM [id number]  [employer]-R [id number] Left 1 Implanted   IMP SCR ACET SNN SPHERICAL HEAD 6.5X20MM [id number] - [id number] Metallic Hardware/[place] IMP SCR ACET SNN SPHERICAL HEAD 6.5X20MM [id number]  [employer]-R [id number] Left 1 Implanted   IMP STEM S&N POLARSTEM STD TI/HA SZ2 NON JUAN FRANCISCO [id number] - [id number] Total Joint Component/Insert IMP STEM S&N POLARSTEM STD TI/HA SZ2 NON JUAN FRANCISCO [id number]  [employer] [id number] Left 1 Implanted   IMP HEAD FEMORAL SNR OXINIUM 12/14 32MM +4 [id number] - [id number] Total Joint Component/Insert IMP HEAD FEMORAL SNR OXINIUM 12/14 32MM +4 [id number]  [employer]R [id number] Left 1 Implanted         Intra-op Labs/Cxs/Specimens:   * No specimens in log *    Drains: none  Complications: No apparent complications during procedure  Findings: Please see dictated operative note for details    Disposition: Stable to  PACU    POST OPERATIVE PLAN    Assessment/Plan: Quiana Shepherd is a 39 year old female s/p Procedure(s):  ARTHROPLASTY, HIP, TOTAL on 4/9/2025 with Dr. Mohamud.    Activity: Up with assist and assistive devices as needed until independent. Posterior hip precautions to operative side x 3 months:  1) No hip flexion beyond 90 degrees  2) No adduction beyond midline  3) No internal rotation beyond neutral   Weight bearing status: WBAT  Antibiotics: IV antibiotics, second dose to be given prior to discharge if cleared for same day discharge, then one dose orals on AM POD #1  Diet: Begin with clear fluids and progress diet as tolerated. Bowel regimen. Anti-emetics PRN.    DVT prophylaxis:  mg x 4 weeks beginning POD 1   Elevation: Elevate heels off of bed on pillows   Wound Care: Tegaderm and alginate x 2 weeks   Drains: none  Pain management: Orals PRN, IV for breakthrough only  X-rays: AP Pelvis and Lateral operative hip in PACU.   Physical Therapy: Assess in PACU for Mobilization, ROM, ADL's, Posterior hip precautions and fast track safety  Occupational Therapy: ADL's  Labs: If admitted for observation, trend Hgb on POD #1, 2  Consults: PT, OT. appreciate assistance in caring for this patient throughout the perioperative period    Future Appointments   Date Time Provider Department Center   4/9/2025  3:00 PM Ivonne Tafoya PT URPT [place]   4/16/2025  2:30 PM Femi Vela, PT [facility]   [facility]20[facility]  [id number]   4/22/2025  2:20 PM [facility]   4/23/2025  1:10 PM Femi Vela A, PT [facility]   4/30/2025  2:30 PM Femi Vela A, PT [facility]   5/7/2025  3:10 PM Mirian, Rafy, PT [facility]   5/14/2025  3:10 PM Mirian, Rafy, PT [facility]   5/21/2025  3:10 PM Mirian, Rafy, PT [facility]OC [facility]   5/22/2025 10:20 AM Jr Mohamud MD [facility]       Disposition: Plan for discharge POD #0 if patient passes Fast  Track protocol. Notify Orthopedics if patient requires overnight stay for observation.     I assisted with positioning, prepping and draping, and closure.      Gwendolyn Montesinos PA-C 4/9/2025 12:42 PM  Orthopedic Surgery

## 2025-04-09 NOTE — PROGRESS NOTES
Pt worked with physical therapy around 1630. Patient felt like her knees buckled and felt dizzy at the end of her session so she stopped and layed down; vitals stable. Ivonne Tafoya, PT mentioned to the writer if patients buckling improves and can walk around the unit she can discharge home still today. Dr. Margaux Rosa on call orthopedic MD updated on progress. Pt got up to the bathroom around 1730 and ambulated well. On her way walking back to her room she felt some ringing in her ears and nausea. Pt was unsure if the ringing in her ears was going to lead to passing out so she sat down in the chair and was wheeled back to her room. Vital taken and stable. Pt was given IV zofran. Pt feeling very motivated to still go home. Dr. Margaux Rosa updated. At 1800 pt got up one more time and was able to successfully ambulate around the nursing unit. Pt's mobility seemed much improved since even going to the restroom and had no more ringing in her ears. Vital signs taken and intact. Pt discharged home to self care with significant other.

## 2025-04-09 NOTE — DISCHARGE INSTRUCTIONS
To contact a doctor, call Dr Mohamud's office at [phone] Or [phone]   or:     [phone] and ask for the Resident On Call for:          Orthopedics (answered 24 hours a day)   Emergency Departments:  [facility] Adult Emergency Department: [phone]

## 2025-04-09 NOTE — PROGRESS NOTES
[facility]                                                                                   OUTPATIENT PHYSICAL THERAPY    PLAN OF TREATMENT FOR OUTPATIENT REHABILITATION   Patient's Last Name, First Name, Quiana Akhtar YOB: 1985   Provider's Name   [facility]   Medical Record No.  [id number]     Onset Date: 04/09/25 Start of Care Date:  4/9/25     Medical Diagnosis:                  PT Diagnosis:  impaired functional mobility Certification Dates:  From:  4/9/25  To:  4/16/25       See note for plan of treatment, functional goals, and certification details.    I CERTIFY THE NEED FOR THESE SERVICES FURNISHED UNDER        THIS PLAN OF TREATMENT AND WHILE UNDER MY CARE (Physician co-signature of this document indicates review and certification of the therapy plan).              04/09/25 1500   Appointment Info   Signing Clinician's Name / Credentials (PT) ANGIE Truong   Student Supervision On-site supervision provided;Direct supervision provided;Line of sight supervision provided;Direct Patient Contact Provided;Therapy services provided with the co-signing licensed therapist guiding and directing the services, and providing the skilled judgement and assessment throughout the session   Rehab Comments (PT) posterior hip precautions   Living Environment   People in Home spouse   Current Living Arrangements house   Home Accessibility stairs to enter home;stairs within home   Number of Stairs, Main Entrance 3  (detached garage to house)   Stair Railings, Main Entrance railing on left side (ascending)   Number of Stairs, Within Home, Primary greater than 10 stairs  (one flight of stairs, not required to navigate for necessary tasks in home.)   Stair Railings, Within Home, Primary   (railings on one side (two sets of stairs, one sided railing on each set))   Transportation Anticipated family or friend will provide  (spouse providing  transport)   Living Environment Comments Uses transfer bench into tub and leg  in transfer. Uses extendable shower head.   Self-Care   Usual Activity Tolerance good   Current Activity Tolerance moderate   Regular Exercise Yes   Activity/Exercise Type biking;strength training   Exercise Amount/Frequency 3-5 times/wk  (strength 2-3 times a week, cycling twice a week)   Equipment Currently Used at Home cane, straight;tub bench  (has modified navigation of stairs at home to make easier (inc UE use))   Fall history within last six months no   Activity/Exercise/Self-Care Comment   (off work for three months w/ intent to return to full functional mobility)   General Information   Onset of Illness/Injury or Date of Surgery 04/09/25   Referring Physician Jr Mohamud MD   Patient/Family Therapy Goals Statement (PT) return to functional mobility   Pertinent History of Current Problem (include personal factors and/or comorbidities that impact the POC) Quiana Shepherd is a 39 year old female s/p Procedure(s):  ARTHROPLASTY, HIP, TOTAL on 4/9/2025 with Dr. Mohamud.   Existing Precautions/Restrictions fall;no hip IR;no hip ADD past midline;90 degree hip flexion;weight bearing   Weight-Bearing Status - LUE full weight-bearing   Weight-Bearing Status - RUE full weight-bearing   Weight-Bearing Status - LLE weight-bearing as tolerated   Weight-Bearing Status - RLE full weight-bearing   General Observations pt sup in post op timothyey w/ spouse present, nursing okays session   Cognition   Affect/Mental Status (Cognition) WNL   Pain Assessment   Patient Currently in Pain Yes, see Vital Sign flowsheet   Integumentary/Edema   Integumentary/Edema Comments post op incision   Posture    Posture Not impaired   Range of Motion (ROM)   Range of Motion ROM deficits secondary to surgical procedure   Strength (Manual Muscle Testing)   Strength (Manual Muscle Testing) Deficits observed during functional mobility   Bed Mobility   Comment,  (Bed Mobility) Supine to/from sit completed w/ minAx1 at LLE   Transfers   Comment, (Transfers) Sit to/from stand completed w/ minAx1 and use of FWW   Gait/Stairs (Locomotion)   Comment, (Gait/Stairs) not assessed d/t LE weakness and dizziness   Balance   Balance Comments not formally assessed   Sensory Examination   Sensory Perception Comments some numbness/tingling in L lateral calf  remaining from surgery at time of eval   Coordination   Coordination Comments not assessed   Muscle Tone   Muscle Tone Comments not assessed   Clinical Impression   Criteria for Skilled Therapeutic Intervention Yes, treatment indicated   PT Diagnosis (PT) impaired functional mobility   Influenced by the following impairments pain, weakness, post op precautions   Functional limitations due to impairments L RADHA   Clinical Presentation (PT Evaluation Complexity) evolving   Clinical Presentation Rationale pt continuing to experience post op numbess/tingling, anticipating this will continue to improve over time   Clinical Decision Making (Complexity) low complexity   Planned Therapy Interventions (PT) gait training;bed mobility training;home exercise program;patient/family education;strengthening;stair training;transfer training;progressive activity/exercise;risk factor education;home program guidelines   Risk & Benefits of therapy have been explained evaluation/treatment results reviewed;care plan/treatment goals reviewed;risks/benefits reviewed;current/potential barriers reviewed;participants voiced agreement with care plan;participants included;patient;spouse/significant other   Clinical Impression Comments pt could benefit from ongoing PT care to inc activity tolerance (stance, amb, stairs).   PT Total Evaluation Time   PT Eval, Low Complexity Minutes (03637) 10   Physical Therapy Goals   PT Frequency Daily   PT Predicted Duration/Target Date for Goal Attainment 04/16/25   PT Goals Bed Mobility;Transfers;Gait;Stairs   PT: Bed Mobility  Modified independent;Within precautions;Supine to/from sit;Rolling   PT: Transfers Modified independent;Sit to/from stand;Assistive device;Bed to/from chair;Within precautions   PT: Gait Modified independent;Standard walker;Within precautions;Greater than 200 feet   PT: Stairs Modified independent;3 stairs;Rail on left   Interventions   Interventions Quick Adds Gait Training;Therapeutic Activity   Therapeutic Activity   Therapeutic Activities: dynamic activities to improve functional performance Minutes (26239) 15   Symptoms Noted During/After Treatment Fatigue;Increased pain   Treatment Detail/Skilled Intervention pt supine in bed upon PT arrival w/ spouse in room. pt/spouse educated on posterior RADHA precautions, verbalized understanding. pt instructed in HEP exercises including ankle pumps, quad sets, glute sets, hamstring sets, and heel slides. pt demonstrated understanding of exercises by completing 5-10 reps of each exercise. pt verbalized understanding of frequency of completion (2x a day, 10 reps of each exercise). pt required minAx1 to complete heel slides to go through available ROM. pt able to demonstrate ind supine to sit from bed. VCs given to remain w/in precautions, pt demonstrated understanding. pt completed STS w/ minAx1 and FWW. following gait training, pt completed stand to sit transfer w/ minAx1 and FWW. pt completed sit to supine transfer w/ minAx1. pt returned to supine following gait training d/t reported dizziness and lightheadedness. pt's bed was flattened entirely w/ intent to subside reported symptoms. BP was taken in this position, BP found to be 109/76. pt edu given regarding the potential for an overnight stay to manage symptoms/current presentation (including those presenting during gait training) and expected progression to achieve if DC today. pt/spouse verbalized understanding, PT to follow up as necessary. pt supine in bed upon exit w/ all needs met.   Gait Training   Gait Training  Minutes (90496) 10   Symptoms Noted During/After Treatment (Gait Training) fatigue;dizziness;increased pain   Treatment Detail/Skilled Intervention pt completed standing marches w/ use of FWW and minAx1. pt able to complete 2 bouts of marches at about 10-15 reps bilaterally w occasional instances of large L knee buckling despite heavy offloading of UEs via FWW. pt required seated rest break in between sets. following second set, pt began to report symptoms of dizziness and lightheadedness. PT exercises discontinued at this point d/t reported symptoms (see TA for management of symptoms).   Distance in Feet 0   PT Discharge Planning   PT Plan transfers, amb, stairs   PT Discharge Recommendation (DC Rec) other (see comments)  (defer to ortho)   PT Rationale for DC Rec pt requiring Ax1 for transfers. pt reporting symptoms in stance at this time. spousal support at home for recovery. Would benefit from increased time IP stay to improve sensation and anticipate w/ return of full L LE strengtht/sensation pt will progress mobility for safe DC home, if still  IP PT to continue to follow.   PT Brief overview of current status minax1 for transfers w/ use of FWW   PT Total Distance Amb During Session (feet) 0   Physical Therapy Time and Intention   Timed Code Treatment Minutes 25   Total Session Time (sum of timed and untimed services) 35

## 2025-04-09 NOTE — ANESTHESIA CARE TRANSFER NOTE
Patient: Quiana Shepherd    Procedure: Procedure(s):  ARTHROPLASTY, HIP, TOTAL       Diagnosis: Osteoarthritis of left hip joint due to dysplasia [M16.32]  Diagnosis Additional Information: No value filed.    Anesthesia Type:   Spinal     Note:    Oropharynx: oropharynx clear of all foreign objects  Level of Consciousness: awake      Independent Airway: airway patency satisfactory and stable  Dentition: dentition unchanged  Vital Signs Stable: post-procedure vital signs reviewed and stable  Report to RN Given: handoff report given  Patient transferred to: PACU    Handoff Report: Identifed the Patient, Identified the Reponsible Provider, Reviewed the pertinent medical history, Discussed the surgical course, Reviewed Intra-OP anesthesia mangement and issues during anesthesia, Set expectations for post-procedure period and Allowed opportunity for questions and acknowledgement of understanding      Vitals:  Vitals Value Taken Time   /64 04/09/25 1245   Temp 36.6 04/08/25 1244   Pulse 62 04/09/25 1248   Resp 14 04/09/25 1248   SpO2 100 % 04/09/25 1248   Vitals shown include unfiled device data.    Electronically Signed By: VANESA Francis CRNA  April 9, 2025  12:49 PM

## 2025-04-09 NOTE — ANESTHESIA PROCEDURE NOTES
"Intrathecal injection Procedure Note    Pre-Procedure   Staff -        Anesthesiologist:  Jackson Morejon MD       Performed By: anesthesiologist       Location: OR       Procedure Start/Stop Times: 4/9/2025 10:20 AM and 4/9/2025 10:28 AM       Pre-Anesthestic Checklist: patient identified, IV checked, risks and benefits discussed, informed consent, monitors and equipment checked, pre-op evaluation, at physician/surgeon's request and post-op pain management  Timeout:       Correct Patient: Yes        Correct Procedure: Yes        Correct Site: Yes        Correct Position: Yes   Procedure Documentation  Procedure: intrathecal injection         Patient Position: sitting       Patient Prep/Sterile Barriers: sterile gloves, mask, patient draped       Skin prep: Chloraprep       Insertion Site: L3-4. (midline approach).       Needle Gauge: 25.        Needle Length (Inches): 3.5        Spinal Needle Type: [facility] tip       Introducer used       # of attempts: 1 and  # of redirects:  0    Assessment/Narrative         Paresthesias: No.       CSF fluid: clear.    Medication(s) Administered   0.75% Hyperbaric Bupivacaine (Intrathecal) - Intrathecal   1.6 mL - 4/9/2025 10:20:00 AM  Medication Administration Time: 4/9/2025 10:20 AM      FOR [facility] ([facility]) ONLY:   Pain Team Contact information: please page the Pain Team Via [employer]. Search \"Pain\". During daytime hours, please page the attending first. At night please page the resident first.      "

## 2025-04-09 NOTE — ANESTHESIA PREPROCEDURE EVALUATION
Anesthesia Pre-Procedure Evaluation    Patient: Quiana Shepherd   MRN: [id number] : 1985        Procedure : Procedure(s):  ARTHROPLASTY, HIP, TOTAL          Past Medical History:   Diagnosis Date    Acne vulgaris 10/26/2017    Hirsutism     Osteoarthritis of left hip joint due to dysplasia       Past Surgical History:   Procedure Laterality Date    BACK SURGERY      Surgery to install halo    ORTHOPEDIC SURGERY      Left hip - TIFFANIE repair      Allergies   Allergen Reactions    Contrast Dye Other (See Comments)     Notes thyroid swelling after a contrast injection for a lumbar LUISA      Social History     Tobacco Use    Smoking status: Never    Smokeless tobacco: Never   Substance Use Topics    Alcohol use: Yes     Alcohol/week: 9.0 standard drinks of alcohol     Types: 9 Standard drinks or equivalent per week      Wt Readings from Last 1 Encounters:   25 76.9 kg (169 lb 8.5 oz)        Anesthesia Evaluation   Pt has had prior anesthetic. Type: General.    History of anesthetic complications  - PONV.      ROS/MED HX  ENT/Pulmonary:  - neg pulmonary ROS     Neurologic:  - neg neurologic ROS     Cardiovascular:  - neg cardiovascular ROS     METS/Exercise Tolerance:     Hematologic:  - neg hematologic  ROS     Musculoskeletal:  - neg musculoskeletal ROS     GI/Hepatic:  - neg GI/hepatic ROS     Renal/Genitourinary:  - neg Renal ROS     Endo:  - neg endo ROS     Psychiatric/Substance Use:  - neg psychiatric ROS     Infectious Disease:  - neg infectious disease ROS     Malignancy:  - neg malignancy ROS     Other:  - neg other ROS          Physical Exam    Airway  airway exam normal      Mallampati: I   TM distance: > 3 FB   Neck ROM: full   Mouth opening: > 3 cm    Respiratory Devices and Support         Dental       (+) Modest Abnormalities - crowns, retainers, 1 or 2 missing teeth      Cardiovascular   cardiovascular exam normal          Pulmonary   pulmonary exam normal                OUTSIDE  "LABS:  CBC:   Lab Results   Component Value Date    WBC 9.1 03/17/2025    WBC 7.6 05/30/2024    HGB 13.6 03/17/2025    HGB 14.0 05/30/2024    HCT 40.9 03/17/2025    HCT 40.7 05/30/2024     03/17/2025     05/30/2024     BMP:   Lab Results   Component Value Date     03/17/2025     05/30/2024    POTASSIUM 4.9 03/17/2025    POTASSIUM 4.1 05/30/2024    CHLORIDE 102 03/17/2025    CHLORIDE 102 05/30/2024    CO2 24 03/17/2025    CO2 24 05/30/2024    BUN 15.0 03/17/2025    BUN 12.7 05/30/2024    CR 1.02 (H) 03/17/2025    CR 0.81 05/30/2024    GLC 98 03/17/2025     (H) 05/30/2024     COAGS: No results found for: \"PTT\", \"INR\", \"FIBR\"  POC: No results found for: \"BGM\", \"HCG\", \"HCGS\"  HEPATIC:   Lab Results   Component Value Date    ALBUMIN 4.4 05/30/2024    PROTTOTAL 6.9 05/30/2024    ALT 11 05/30/2024    AST 21 05/30/2024    ALKPHOS 63 05/30/2024    BILITOTAL 0.2 05/30/2024     OTHER:   Lab Results   Component Value Date    DIANE 9.9 03/17/2025    TSH 0.93 05/30/2024    SED 8 12/23/2022       Anesthesia Plan    ASA Status:  1    NPO Status:  NPO Appropriate    Anesthesia Type: Spinal.   Induction: Intravenous.   Maintenance: TIVA.        Consents    Anesthesia Plan(s) and associated risks, benefits, and realistic alternatives discussed. Questions answered and patient/representative(s) expressed understanding.     - Discussed: Risks, Benefits and Alternatives for BOTH SEDATION and the PROCEDURE were discussed     - Discussed with:  Patient            Postoperative Care    Pain management: Oral pain medications, Multi-modal analgesia.   PONV prophylaxis: Ondansetron (or other 5HT-3), Dexamethasone or Solumedrol     Comments:               Jackson Morejon MD, MD    Clinically Significant Risk Factors Present on Admission                             # Overweight: Estimated body mass index is 26.55 kg/m  as calculated from the following:    Height as of this encounter: 1.702 m (5' 7\").    Weight " as of this encounter: 76.9 kg (169 lb 8.5 oz).

## 2025-04-10 NOTE — PLAN OF CARE
Physical Therapy Discharge Summary    Reason for therapy discharge:    Discharged to home with outpatient therapy.    Progress towards therapy goal(s). See goals on Care Plan in [facility] electronic health record for goal details.  Goals not met.  Barriers to achieving goals:   limited tolerance for therapy and discharge from facility.    Therapy recommendation(s):    Continued therapy is recommended.  Rationale/Recommendations:  pt plans to continue w/ OP PT.

## 2025-04-11 ENCOUNTER — MYC MEDICAL ADVICE (OUTPATIENT)
Dept: ORTHOPEDICS | Facility: CLINIC | Age: 40
End: 2025-04-11
Payer: COMMERCIAL

## 2025-04-11 DIAGNOSIS — Z96.642 STATUS POST TOTAL REPLACEMENT OF LEFT HIP: Primary | ICD-10-CM

## 2025-04-11 RX ORDER — OXYCODONE HYDROCHLORIDE 5 MG/1
5-10 TABLET ORAL EVERY 4 HOURS PRN
Qty: 30 TABLET | Refills: 0 | Status: SHIPPED | OUTPATIENT
Start: 2025-04-11

## 2025-04-14 ENCOUNTER — OFFICE VISIT (OUTPATIENT)
Dept: ORTHOPEDICS | Facility: CLINIC | Age: 40
End: 2025-04-14
Payer: COMMERCIAL

## 2025-04-14 ENCOUNTER — HOSPITAL ENCOUNTER (EMERGENCY)
Facility: CLINIC | Age: 40
Discharge: HOME | End: 2025-04-14
Attending: EMERGENCY MEDICINE
Payer: COMMERCIAL

## 2025-04-14 ENCOUNTER — DOCUMENTATION ONLY (OUTPATIENT)
Dept: ORTHOPEDICS | Facility: CLINIC | Age: 40
End: 2025-04-14

## 2025-04-14 ENCOUNTER — ANCILLARY PROCEDURE (OUTPATIENT)
Dept: GENERAL RADIOLOGY | Facility: CLINIC | Age: 40
End: 2025-04-14
Attending: PHYSICIAN ASSISTANT
Payer: COMMERCIAL

## 2025-04-14 ENCOUNTER — APPOINTMENT (OUTPATIENT)
Dept: GENERAL RADIOLOGY | Facility: CLINIC | Age: 40
End: 2025-04-14
Attending: EMERGENCY MEDICINE
Payer: COMMERCIAL

## 2025-04-14 VITALS
HEART RATE: 102 BPM | SYSTOLIC BLOOD PRESSURE: 107 MMHG | RESPIRATION RATE: 16 BRPM | OXYGEN SATURATION: 98 % | TEMPERATURE: 99 F | DIASTOLIC BLOOD PRESSURE: 82 MMHG

## 2025-04-14 DIAGNOSIS — S73.035A ANTERIOR DISLOCATION OF LEFT HIP, INITIAL ENCOUNTER (H): ICD-10-CM

## 2025-04-14 DIAGNOSIS — Z96.642 STATUS POST TOTAL REPLACEMENT OF LEFT HIP: ICD-10-CM

## 2025-04-14 DIAGNOSIS — Z96.642 STATUS POST TOTAL REPLACEMENT OF LEFT HIP: Primary | ICD-10-CM

## 2025-04-14 LAB
ALBUMIN SERPL BCG-MCNC: 3.5 G/DL (ref 3.5–5.2)
ALP SERPL-CCNC: 84 U/L (ref 40–150)
ALT SERPL W P-5'-P-CCNC: 37 U/L (ref 0–50)
ANION GAP SERPL CALCULATED.3IONS-SCNC: 14 MMOL/L (ref 7–15)
AST SERPL W P-5'-P-CCNC: 34 U/L (ref 0–45)
BASOPHILS # BLD AUTO: 0 10E3/UL (ref 0–0.2)
BASOPHILS NFR BLD AUTO: 0 %
BILIRUB SERPL-MCNC: 0.3 MG/DL
BUN SERPL-MCNC: 10.4 MG/DL (ref 6–20)
CALCIUM SERPL-MCNC: 9 MG/DL (ref 8.8–10.4)
CHLORIDE SERPL-SCNC: 105 MMOL/L (ref 98–107)
CREAT SERPL-MCNC: 0.61 MG/DL (ref 0.51–0.95)
EGFRCR SERPLBLD CKD-EPI 2021: >90 ML/MIN/1.73M2
EOSINOPHIL # BLD AUTO: 0.1 10E3/UL (ref 0–0.7)
EOSINOPHIL NFR BLD AUTO: 1 %
ERYTHROCYTE [DISTWIDTH] IN BLOOD BY AUTOMATED COUNT: 12.9 % (ref 10–15)
GLUCOSE SERPL-MCNC: 92 MG/DL (ref 70–99)
HCG SERPL QL: NEGATIVE
HCO3 SERPL-SCNC: 21 MMOL/L (ref 22–29)
HCT VFR BLD AUTO: 36.7 % (ref 35–47)
HGB BLD-MCNC: 11.8 G/DL (ref 11.7–15.7)
IMM GRANULOCYTES # BLD: 0 10E3/UL
IMM GRANULOCYTES NFR BLD: 0 %
LYMPHOCYTES # BLD AUTO: 1.4 10E3/UL (ref 0.8–5.3)
LYMPHOCYTES NFR BLD AUTO: 15 %
MCH RBC QN AUTO: 31.1 PG (ref 26.5–33)
MCHC RBC AUTO-ENTMCNC: 32.2 G/DL (ref 31.5–36.5)
MCV RBC AUTO: 97 FL (ref 78–100)
MONOCYTES # BLD AUTO: 0.6 10E3/UL (ref 0–1.3)
MONOCYTES NFR BLD AUTO: 7 %
NEUTROPHILS # BLD AUTO: 7.3 10E3/UL (ref 1.6–8.3)
NEUTROPHILS NFR BLD AUTO: 77 %
NRBC # BLD AUTO: 0 10E3/UL
NRBC BLD AUTO-RTO: 0 /100
PLATELET # BLD AUTO: 342 10E3/UL (ref 150–450)
POTASSIUM SERPL-SCNC: 4.5 MMOL/L (ref 3.4–5.3)
PROT SERPL-MCNC: 6.7 G/DL (ref 6.4–8.3)
RADIOLOGIST FLAGS: NORMAL
RBC # BLD AUTO: 3.79 10E6/UL (ref 3.8–5.2)
SODIUM SERPL-SCNC: 140 MMOL/L (ref 135–145)
WBC # BLD AUTO: 9.5 10E3/UL (ref 4–11)

## 2025-04-14 PROCEDURE — L1686 HO POST-OP HIP ABDUCTION: HCPCS

## 2025-04-14 PROCEDURE — 99284 EMERGENCY DEPT VISIT MOD MDM: CPT | Mod: 25 | Performed by: EMERGENCY MEDICINE

## 2025-04-14 PROCEDURE — 27265 TREAT HIP DISLOCATION: CPT | Mod: LT | Performed by: EMERGENCY MEDICINE

## 2025-04-14 PROCEDURE — 84703 CHORIONIC GONADOTROPIN ASSAY: CPT | Performed by: EMERGENCY MEDICINE

## 2025-04-14 PROCEDURE — 999N000065 XR PELVIS PORT 1/2 VIEWS

## 2025-04-14 PROCEDURE — 99024 POSTOP FOLLOW-UP VISIT: CPT | Performed by: PHYSICIAN ASSISTANT

## 2025-04-14 PROCEDURE — 250N000013 HC RX MED GY IP 250 OP 250 PS 637: Performed by: EMERGENCY MEDICINE

## 2025-04-14 PROCEDURE — 82947 ASSAY GLUCOSE BLOOD QUANT: CPT | Performed by: EMERGENCY MEDICINE

## 2025-04-14 PROCEDURE — 85004 AUTOMATED DIFF WBC COUNT: CPT | Performed by: EMERGENCY MEDICINE

## 2025-04-14 PROCEDURE — 36415 COLL VENOUS BLD VENIPUNCTURE: CPT | Performed by: EMERGENCY MEDICINE

## 2025-04-14 PROCEDURE — 73502 X-RAY EXAM HIP UNI 2-3 VIEWS: CPT | Mod: LT | Performed by: RADIOLOGY

## 2025-04-14 PROCEDURE — 72170 X-RAY EXAM OF PELVIS: CPT | Mod: 26 | Performed by: STUDENT IN AN ORGANIZED HEALTH CARE EDUCATION/TRAINING PROGRAM

## 2025-04-14 PROCEDURE — 250N000011 HC RX IP 250 OP 636: Performed by: EMERGENCY MEDICINE

## 2025-04-14 PROCEDURE — 99285 EMERGENCY DEPT VISIT HI MDM: CPT | Mod: 25 | Performed by: EMERGENCY MEDICINE

## 2025-04-14 PROCEDURE — 82310 ASSAY OF CALCIUM: CPT | Performed by: EMERGENCY MEDICINE

## 2025-04-14 RX ORDER — PROPOFOL 10 MG/ML
40 INJECTION, EMULSION INTRAVENOUS ONCE
Status: COMPLETED | OUTPATIENT
Start: 2025-04-14 | End: 2025-04-14

## 2025-04-14 RX ORDER — OXYCODONE HYDROCHLORIDE 10 MG/1
10 TABLET ORAL ONCE
Status: COMPLETED | OUTPATIENT
Start: 2025-04-14 | End: 2025-04-14

## 2025-04-14 RX ORDER — OXYCODONE HYDROCHLORIDE 5 MG/1
5 TABLET ORAL ONCE
Status: COMPLETED | OUTPATIENT
Start: 2025-04-14 | End: 2025-04-14

## 2025-04-14 RX ORDER — PROPOFOL 10 MG/ML
INJECTION, EMULSION INTRAVENOUS
Status: DISCONTINUED
Start: 2025-04-14 | End: 2025-04-14 | Stop reason: HOSPADM

## 2025-04-14 RX ADMIN — OXYCODONE HYDROCHLORIDE 10 MG: 10 TABLET ORAL at 19:09

## 2025-04-14 RX ADMIN — OXYCODONE HYDROCHLORIDE 5 MG: 5 TABLET ORAL at 21:23

## 2025-04-14 RX ADMIN — PROPOFOL 40 MG: 10 INJECTION, EMULSION INTRAVENOUS at 17:07

## 2025-04-14 ASSESSMENT — ACTIVITIES OF DAILY LIVING (ADL)
ADLS_ACUITY_SCORE: 42

## 2025-04-14 ASSESSMENT — COLUMBIA-SUICIDE SEVERITY RATING SCALE - C-SSRS
1. IN THE PAST MONTH, HAVE YOU WISHED YOU WERE DEAD OR WISHED YOU COULD GO TO SLEEP AND NOT WAKE UP?: NO
2. HAVE YOU ACTUALLY HAD ANY THOUGHTS OF KILLING YOURSELF IN THE PAST MONTH?: NO
6. HAVE YOU EVER DONE ANYTHING, STARTED TO DO ANYTHING, OR PREPARED TO DO ANYTHING TO END YOUR LIFE?: NO

## 2025-04-14 NOTE — ED TRIAGE NOTES
[place] [facility] with confirmed L. Hip disclocation. Pt had L. Total hip with posterior approach on 4/9/25. On Friday pt felt hip dislocate during a transfer. Pt called sx team and they advised  her to be seen in clinic. In clinic pt was imaged and confirmed dislocation. En route VSS stable. EMS gave 75 mcg fetanyl, 4mg, zofran, 16 mg ketamine @ approx. 1530. CMS baseline since surgery (pedal pulse present, warm to touch, movement WNL, but pt reports tingling)     BP (!) 124/91   Pulse 79   Temp 98.1  F (36.7  C)   Resp 22   LMP  (LMP Unknown)   SpO2 98%     Hx: Osteoarthritis of left hip joint due to dysplasia

## 2025-04-14 NOTE — PROGRESS NOTES
Rapid Response Epic Documentation     Situation:   Female Was brought to the clinic via her personal vehicle for a follow up on her recent hip replacement surgery. She states it felt like it has dislocated and needed assistance to get out of the car. With the extreme difficulty getting her out of the vehicle, she was advised to go straight to the ER. Pt refused and just wanted to get to her Appt. Following the X-ray, it was noted she had dislocated her left hip.     Objective:   Con. And Alert Ox3, Skin is PWD     Assessment:            BP:  124/91    Pulse: 79  Respiration: 22  SPO2: 98 %  Glucose:  mg/dl  Mental Status: Alert  CMS: Intact  Stroke Scale: Not Applicable  EKG: Not Performed      Treatment:          Location:  4th floor X-ray        Disposition:      Transfer to Emergency Room Via: 911    Protocol Used:     Pain Management

## 2025-04-14 NOTE — LETTER
4/14/2025      Quiana Shepherd  [address] [facility] [id number]      Dear Colleague,    Thank you for referring your patient, Quiana Shepherd, to the [facility] [place]. Please see a copy of my visit note below.        [facility]  Orthopaedic Surgery  by Cipriano Barrera PA-C    Quiana Shepherd MRN# [id number]    YOB: 1985            Assessment and Plan:   Assessment:  39-year-old female presents today 5 days status post left total hip arthroplasty for evaluation of her acute left hip pain which is secondary to an anterior hip dislocation     Plan:  After reviewing x-ray while the patient remained on the x-ray table it was noted that there was an anterior hip dislocation.  The patient was left on the x-ray table and a rapid response was called.  Paramedics were called to transport the patient to [facility] ER.  I called Dr. Mohamud who was in the operating room on the [facility] and notified him of the dislocation.  Paramedics arrived and the patient was transferred to a stretcher and sent to the [facility] emergency department.     Cipriano Barrera PA-C  Physician Assistant   Oncology and Adult Reconstructive Surgery  Dept Orthopaedic Surgery, [facility]             History of Present Illness:   39-year-old female who presents today 5 days status post left total hip arthroplasty for evaluation of her acute left hip pain.  States that last Friday she was shifting her weight in a chair and rotated the hip when she felt a pop.  Following the pop she was able to bear weight on the hip but had pain with range of motion and weightbearing.   They did not note any deformities or shortening of the leg following the injury.  Pain has been persistent and progressive over the weekend.  Patient presents to clinic today for x-rays and evaluation of her acute pain.         Physical Exam:     EXAMINATION pertinent findings:   PSYCH: Pleasant, healthy-appearing, alert,  oriented x3, cooperative. Normal mood and affect.  VITAL SIGNS: not currently breastfeeding..  Reviewed nursing intake notes.   There is no height or weight on file to calculate BMI.  RESP: non labored breathing   ABD: benign, soft, non-tender, no acute peritoneal findings  SKIN: grossly normal   LYMPHATIC: grossly normal, no adenopathy, no extremity edema  NEURO: grossly normal , no motor deficits  VASCULAR: satisfactory perfusion of all extremities   MUSCULOSKELETAL:     Left hip: Severe pain with any movement or manipulation.  No significant shortening noted.      Left LE:              Thigh and leg compartments soft and compressible                      SILT DP/SP/Fidel/Saph/Tib nerve distributions              Palpable dorsalis pedis pulse              Data:   All laboratory data reviewed  All imaging studies reviewed by me    4/14/2025 AP pelvis lateral left hip: Interval changes of a left total hip arthroplasty with anterior hip dislocation.  No periprosthetic fractures noted.             Again, thank you for allowing me to participate in the care of your patient.        Sincerely,        Cipriano Barrera PA-C    Electronically signed

## 2025-04-14 NOTE — CONFIDENTIAL NOTE
Called and talked with patient, she states that on Friday she noted her hip twisted and she heard a pop. Since then she has not been able to put full weight on her leg, walking on her tip toes only. Her pain is not controlled like it was before. She is having the symptoms expressed in her mychart.   We discussed that she does not have classic dislocation signs, but it is still a big concern. We want her to come into clinic for an xray and to see PA.   Appt made for 2pm today with YAN Cota.  We can discuss a refill at that time.  Roxane Robertson RN

## 2025-04-14 NOTE — SEDATION DOCUMENTATION
Patient getting left hip reduction.  VS q2mins  1709 80mg propofol given  1710 20mg propofol given   1711 20mg propofol given   Xray called for portable 1712  1714 20mg propofol given   Xray arrived 1715

## 2025-04-14 NOTE — CONSULTS
[facility] Orthopedic Consultation    Quiana Shepherd MRN# [id number]   Age: 39 year old YOB: 1985   Date of Admission:  4/14/2025    Reason for consult: Prosthetic hip dislocation   Requesting physician: Ricardo Dixon MD              Impression and Recommendation:     Impression:  Quiana Shepherd is a 39 year old  female  POD 5 s/p L RADHA who presents to the ED with anterior hip dislocation, quad weakness in the distrubution of the femoral nerve.      Recomendations:  -Reduction with sedation completed in ED today   -Hip abduction brace to be worn while up until follow up   -WBAT, reviewed both posterior and anterior hip precautions   -Continue ASA for DVT prophylaxis, pain management plan in place   -Follow up scheduled for 4/22, patient has our clinic and Dr. Mohamud's contact information     Patient was seen and examined with Dr. Mohamud          Chief Complaint:   L hip pain         History of Present Illness:   Quiana Shepherd is a 39 year old female POD 5 s/p L RADHA, presents to the ED from clinic with an anterior hip dislocation. Quiana notes she noticed a popping sensation on Friday (4/11/25) evening while sitting on the edge of the bath tub. Event involved external rotation. She was able to ambulate after the event with use of a walker, though notes she had to walk on her tip toes rather than flat footed.  Was taking 2 oxycodone every 4 hours, tylenol in between. She did not require more pain medication after the event, though does recall greater discomfort.  Reached out to our clinic this morning, was seen this afternoon.  Brought to the [place] ED by EMS.  Here with her partner.     Pain:  Onset: Acute  Location: Anterior hip, groin   Severity: Moderate/Severe  Quality: Sharp  Alleviating: Rest  Exacerbating: Movement, palpation, ambulation   Associated Symptoms: No associated numbness/tingling.      Denies numbness, tingling, or weakness to the affected extremities.  Denies  fevers, chills, nausea, vomiting, diarrhea, constipation, chest pain, shortness of breath.     History obtained from patient interview and chart review.        Past Medical History:     Past Medical History:   Diagnosis Date    Acne vulgaris 10/26/2017    Hirsutism     Osteoarthritis of left hip joint due to dysplasia              Past Surgical History:     Past Surgical History:   Procedure Laterality Date    ARTHROPLASTY HIP Left 4/9/2025    Procedure: Left total hip arthroplasty;  Surgeon: Jr Mohamud MD;  Location: [facility] OR    BACK SURGERY  1993    Surgery to install halo    ORTHOPEDIC SURGERY  2011    Left hip - TIFFANIE repair             Social History:   Tobacco use: None   Alcohol use: Casual use   Elicit drug use: Patient denies  Occupation: [employer] at [employer]   Living situation: Patient lives in a house , significant other is staying with her           Family History:   No family history of anesthesia, bleeding or clotting complications.           Allergies:     Allergies   Allergen Reactions    Contrast Dye Other (See Comments)     Notes thyroid swelling after a contrast injection for a lumbar LUISA             Medications:   Medication reviewed with patient and in chart.  Anticoagulation:  mg daily for post op DVT prophylaxis   Antibiotics: None          Review of Systems:   See HPI, 10 point review of systems was otherwise negative.        Physical Exam:     Vitals:    04/14/25 1558 04/14/25 1600   BP: (!) 124/91 (!) 124/91   Pulse: 79    Resp: 22    Temp: 98.1  F (36.7  C)    SpO2: 98% 100%     General: Awake, alert, appropriate, following commands, NAD.  Neuro: EOM grossly intact  Skin: No rashes,  skin color normal.  HEENT: Normal.   Lungs: Breathing comfortably and nonlabored, no wheezes or stridor noted.  Heart/Cardiovascular: Regular pulse, no peripheral cyanosis.      Right Lower Extremity:   - No gross deformity, skin intact.  - Motor intact distally TA, GSC, EHL, FHL with 5/5  "strength.  - SILT sp, dp, sa, prakash, ti n. Distributions.   - DP/PT pulses palpable, toes warm and well perfused.      Left Lower Extremity:   - No gross deformity, skin intact.  Surgical dressing is clean dry and intact.  Leg does not appear shortened or rotated.  - Tenderness to palpation about the hip and thigh  - Does not tolerate any passive range of motion at the hip  - Motor intact distally TA, GSC, EHL, FHL with 5/5 strength. Quad weakness.   - SILT sp, dp, sa, prakash, ti n. Distributions.   - DP/PT pulses palpable, toes warm and well perfused.          Imaging:   All imaging independently reviewed.  XR L hip from 4/14/2025 demonstrate anterior hip dislocation          Laboratory date:   CBC:  Lab Results   Component Value Date    WBC 9.5 04/14/2025    HGB 11.8 04/14/2025     04/14/2025       BMP:  Lab Results   Component Value Date     03/17/2025    POTASSIUM 4.9 03/17/2025    CHLORIDE 102 03/17/2025    CO2 24 03/17/2025    BUN 15.0 03/17/2025    CR 1.02 (H) 03/17/2025    ANIONGAP 11 03/17/2025    DIANE 9.9 03/17/2025    GLC 98 03/17/2025       Inflammatory Markers:  Lab Results   Component Value Date    WBC 9.5 04/14/2025    SED 8 12/23/2022       Cultures:  No results for input(s): \"CULT\" in the last 168 hours.      Vianey Lauren PA-C  4/14/2025 4:48 PM  Orthopaedic Surgery    "

## 2025-04-14 NOTE — PROGRESS NOTES
[facility]  Orthopaedic Surgery  by Cipriano Barrera PA-C    Quiana Shepherd MRN# [id number]    YOB: 1985            Assessment and Plan:   Assessment:  39-year-old female presents today 5 days status post left total hip arthroplasty for evaluation of her acute left hip pain which is secondary to an anterior hip dislocation     Plan:  After reviewing x-ray while the patient remained on the x-ray table it was noted that there was an anterior hip dislocation.  The patient was left on the x-ray table and a rapid response was called.  Paramedics were called to transport the patient to [facility] ER.  I called Dr. Mohamud who was in the operating room on the [facility] and notified him of the dislocation.  Paramedics arrived and the patient was transferred to a stretcher and sent to the [facility] emergency department.     Cipriano Barrera PA-C  Physician Assistant   Oncology and Adult Reconstructive Surgery  Dept Orthopaedic Surgery, [facility]             History of Present Illness:   39-year-old female who presents today 5 days status post left total hip arthroplasty for evaluation of her acute left hip pain.  States that last Friday she was shifting her weight in a chair and rotated the hip when she felt a pop.  Following the pop she was able to bear weight on the hip but had pain with range of motion and weightbearing.   They did not note any deformities or shortening of the leg following the injury.  Pain has been persistent and progressive over the weekend.  Patient presents to clinic today for x-rays and evaluation of her acute pain.         Physical Exam:     EXAMINATION pertinent findings:   PSYCH: Pleasant, healthy-appearing, alert, oriented x3, cooperative. Normal mood and affect.  VITAL SIGNS: not currently breastfeeding..  Reviewed nursing intake notes.   There is no height or weight on file to calculate BMI.  RESP: non labored breathing   ABD: benign, soft, non-tender, no acute  peritoneal findings  SKIN: grossly normal   LYMPHATIC: grossly normal, no adenopathy, no extremity edema  NEURO: grossly normal , no motor deficits  VASCULAR: satisfactory perfusion of all extremities   MUSCULOSKELETAL:     Left hip: Severe pain with any movement or manipulation.  No significant shortening noted.      Left LE:              Thigh and leg compartments soft and compressible                      SILT DP/SP/Fidel/Saph/Tib nerve distributions              Palpable dorsalis pedis pulse              Data:   All laboratory data reviewed  All imaging studies reviewed by me    4/14/2025 AP pelvis lateral left hip: Interval changes of a left total hip arthroplasty with anterior hip dislocation.  No periprosthetic fractures noted.

## 2025-04-14 NOTE — ED TRIAGE NOTES
Triage Assessment (Adult)       Row Name 04/14/25 0972          Triage Assessment    Airway WDL WDL        Respiratory WDL    Respiratory WDL WDL        Skin Circulation/Temperature WDL    Skin Circulation/Temperature WDL WDL        Cardiac WDL    Cardiac WDL WDL  CMS baseline since sx        Peripheral/Neurovascular WDL    Peripheral Neurovascular WDL WDL        Cognitive/Neuro/Behavioral WDL    Cognitive/Neuro/Behavioral WDL WDL

## 2025-04-14 NOTE — ED PROVIDER NOTES
[facility] EMERGENCY DEPARTMENT ([facility])    25       ED PROVIDER NOTE        History     Chief Complaint   Patient presents with    Hip Pain     HPI  Quiana Shepherd is a 39 year old female with PMH of osteoarthritis of left hip joint due to dysplasia s/p total left hip arthroplasty (25) who presents to the ED with hip pain.    Past Medical History  Past Medical History:   Diagnosis Date    Acne vulgaris 10/26/2017    Hirsutism     Osteoarthritis of left hip joint due to dysplasia      Past Surgical History:   Procedure Laterality Date    ARTHROPLASTY HIP Left 2025    Procedure: Left total hip arthroplasty;  Surgeon: Jr Mohamud MD;  Location: [facility] OR    BACK SURGERY      Surgery to install halo    ORTHOPEDIC SURGERY      Left hip - TIFFANIE repair     acetaminophen (TYLENOL) 325 MG tablet  aspirin 81 MG EC tablet  cholecalciferol (VITAMIN D3) 25 mcg (1000 units) capsule  Cyanocobalamin (B-12) 1000 MCG TBCR  dapsone (ACZONE) 7.5 % gel  eflornithine HCl 13.9 % CREA  eflornithine HCl 13.9 % CREA  levonorgestrel (MIRENA) 20 MCG/24HR IUD  ondansetron (ZOFRAN ODT) 4 MG ODT tab  oxyCODONE (ROXICODONE) 5 MG tablet  oxyCODONE (ROXICODONE) 5 MG tablet  senna-docusate (SENOKOT-S/PERICOLACE) 8.6-50 MG tablet  spironolactone (ALDACTONE) 100 MG tablet  tretinoin (RETIN-A) 0.025 % external cream  tretinoin (RETIN-A) 0.05 % external cream      Allergies   Allergen Reactions    Contrast Dye Other (See Comments)     Notes thyroid swelling after a contrast injection for a lumbar LUISA     Family History  Family History   Problem Relation Age of Onset    Cancer Mother         melanoma    Melanoma Mother     Osteoporosis Mother     Thyroid Disease Mother     Coronary Artery Disease Father          of heart attack at age 79    Hypertension Father     Prostate Cancer Father     Thyroid Disease Sister     Diabetes Maternal Grandmother     Cerebrovascular Disease Maternal Grandmother     Breast Cancer  Maternal Grandmother     Depression Maternal Grandmother     Mental Illness Maternal Grandmother     Coronary Artery Disease Maternal Grandfather          of a heart attack in his 50s    Breast Cancer Paternal Grandmother     Substance Abuse Paternal Grandfather     Cancer Other         Maternal aunt has a hx of melanoma    Melanoma Other         maternal aunt    Skin Cancer No family hx of     Anesthesia Reaction No family hx of     Bleeding Disorder No family hx of     Clotting Disorder No family hx of      Social History   Social History     Tobacco Use    Smoking status: Never    Smokeless tobacco: Never   Substance Use Topics    Alcohol use: Yes     Alcohol/week: 9.0 standard drinks of alcohol     Types: 9 Standard drinks or equivalent per week    Drug use: Yes     Types: Marijuana     Comment: Vape or edibles      Past medical history, past surgical history, medications, allergies, family history, and social history were reviewed with the patient. No additional pertinent items.   A complete review of systems was performed with pertinent positives and negatives noted in the HPI, and all other systems negative.    Physical Exam      Physical Exam  Vitals and nursing note reviewed.   Constitutional:       General: She is not in acute distress.     Appearance: Normal appearance. She is not diaphoretic.   HENT:      Head: Atraumatic.      Mouth/Throat:      Mouth: Mucous membranes are moist.   Eyes:      General: No scleral icterus.     Conjunctiva/sclera: Conjunctivae normal.   Cardiovascular:      Rate and Rhythm: Normal rate.      Heart sounds: Normal heart sounds.   Pulmonary:      Effort: No respiratory distress.      Breath sounds: Normal breath sounds.   Abdominal:      General: Abdomen is flat.   Musculoskeletal:      Cervical back: Neck supple.   Skin:     General: Skin is warm.      Findings: No rash.   Neurological:      Mental Status: She is alert.       ***    ED Course, Procedures, & Data     "  Procedures       {ED Course Selections (Optional):771572}  {ED Sepsis CMS Documentation (Optional):573461::\" \"}       No results found for any visits on 04/14/25.  Medications - No data to display  Labs Ordered and Resulted from Time of ED Arrival to Time of ED Departure - No data to display  No orders to display          {Critical Care Performed?:683648}    Assessment & Plan    ***    I have reviewed the nursing notes. I have reviewed the findings, diagnosis, plan and need for follow up with the patient.    New Prescriptions    No medications on file       Final diagnoses:   None       Ricardo Dixon MD  [facility] located within [facility] EMERGENCY DEPARTMENT  4/14/2025  " qualitative pregnancy (blood)     Status: Normal   Result Value Ref Range    hCG Serum Qualitative Negative Negative   CBC with platelets and differential     Status: Abnormal   Result Value Ref Range    WBC Count 9.5 4.0 - 11.0 10e3/uL    RBC Count 3.79 (L) 3.80 - 5.20 10e6/uL    Hemoglobin 11.8 11.7 - 15.7 g/dL    Hematocrit 36.7 35.0 - 47.0 %    MCV 97 78 - 100 fL    MCH 31.1 26.5 - 33.0 pg    MCHC 32.2 31.5 - 36.5 g/dL    RDW 12.9 10.0 - 15.0 %    Platelet Count 342 150 - 450 10e3/uL    % Neutrophils 77 %    % Lymphocytes 15 %    % Monocytes 7 %    % Eosinophils 1 %    % Basophils 0 %    % Immature Granulocytes 0 %    NRBCs per 100 WBC 0 <1 /100    Absolute Neutrophils 7.3 1.6 - 8.3 10e3/uL    Absolute Lymphocytes 1.4 0.8 - 5.3 10e3/uL    Absolute Monocytes 0.6 0.0 - 1.3 10e3/uL    Absolute Eosinophils 0.1 0.0 - 0.7 10e3/uL    Absolute Basophils 0.0 0.0 - 0.2 10e3/uL    Absolute Immature Granulocytes 0.0 <=0.4 10e3/uL    Absolute NRBCs 0.0 10e3/uL   [place] Draw     Status: None (In process)    Narrative    The following orders were created for panel order [place] Draw.  Procedure                               Abnormality         Status                     ---------                               -----------         ------                     Extra Blue Top Tube[[id number]]                             In process                   Please view results for these tests on the individual orders.   CBC with Platelets & Differential     Status: Abnormal    Narrative    The following orders were created for panel order CBC with Platelets & Differential.  Procedure                               Abnormality         Status                     ---------                               -----------         ------                     CBC with platelets and ...[[id number]]  Abnormal            Final result                 Please view results for these tests on the individual orders.   Results for orders placed or performed in  visit on 04/14/25   XR Pelvis and Hip Left 1 View     Status: None   Result Value Ref Range    Radiologist flags Postsurgical changes of left total hip     Narrative    3 views pelvis radiograph(s) 4/14/2025 3:55 PM    History: Status post total replacement of left hip    Comparison: 4/9/2025    Findings:    3 view(s) of the pelvis were obtained.     Early postsurgical changes of left total hip arthroplasty. The femoral  component is displaced anteriorly. The acetabular component is well  seated.    Degenerative changes of the right hip. Intrauterine device.    Sacrum and innominate bones are partially obscured by overlying bowel  gas/fecal content.    Pelvic phlebolith.      Impression    Impression:  1. Postsurgical changes of left total hip arthroplasty with femoral  component displaced anteriorly.  2. No substantial degenerative change.    [Consider Follow Up: Postsurgical changes of left total hip  arthroplasty with femoral component displaced anteriorly.  ]    This report will be copied to the [facility] to ensure a  provider acknowledges the finding. [facility] Center is available Monday  through Friday 8am-3:30 pm.    JUTTA ELLERMANN, MD         SYSTEM ID:  [id number]     Medications   propofol (DIPRIVAN) injection 10 mg/mL vial (40 mg Intravenous $Given by Other 4/14/25 1707)   oxyCODONE IR (ROXICODONE) tablet 10 mg (10 mg Oral $Given 4/14/25 1909)   oxyCODONE (ROXICODONE) tablet 5 mg (5 mg Oral $Given 4/14/25 2123)     Labs Ordered and Resulted from Time of ED Arrival to Time of ED Departure   COMPREHENSIVE METABOLIC PANEL - Abnormal       Result Value    Sodium 140      Potassium 4.5      Carbon Dioxide (CO2) 21 (*)     Anion Gap 14      Urea Nitrogen 10.4      Creatinine 0.61      GFR Estimate >90      Calcium 9.0      Chloride 105      Glucose 92      Alkaline Phosphatase 84      AST 34      ALT 37      Protein Total 6.7      Albumin 3.5      Bilirubin Total 0.3     CBC WITH PLATELETS AND  DIFFERENTIAL - Abnormal    WBC Count 9.5      RBC Count 3.79 (*)     Hemoglobin 11.8      Hematocrit 36.7      MCV 97      MCH 31.1      MCHC 32.2      RDW 12.9      Platelet Count 342      % Neutrophils 77      % Lymphocytes 15      % Monocytes 7      % Eosinophils 1      % Basophils 0      % Immature Granulocytes 0      NRBCs per 100 WBC 0      Absolute Neutrophils 7.3      Absolute Lymphocytes 1.4      Absolute Monocytes 0.6      Absolute Eosinophils 0.1      Absolute Basophils 0.0      Absolute Immature Granulocytes 0.0      Absolute NRBCs 0.0     HCG QUALITATIVE PREGNANCY - Normal    hCG Serum Qualitative Negative       XR Pelvis Port 1/2 Views   Final Result   IMPRESSION: Interval relocation of the left hip arthroplasty. Components appear in normal alignment on this single limited AP view radiograph.             Critical care was not performed.     Medical Decision Making  The patient's presentation was of high complexity (an acute health issue posing potential threat to life or bodily function).    The patient's evaluation involved:  review of external note(s) from 1 sources (see separate area of note for details)  review of 3+ test result(s) ordered prior to this encounter (see separate area of note for details)  ordering and/or review of 3+ test(s) in this encounter (see separate area of note for details)  discussion of management or test interpretation with another health professional (see separate area of note for details)    The patient's management necessitated high risk (a parenteral controlled substance) and high risk (a decision regarding emergency major procedure (dislocation reduction)).    Assessment & Plan    Quiana Shepherd is a 39 year old female with PMH of osteoarthritis of left hip joint due to dysplasia s/p total left hip arthroplasty (4/9/25) who presents to the ED with hip pain and dislocation.  Patient is nontoxic-appearing on exam, his vital signs within normal limits.  She is  neurovascularly intact distal to the site of injury of the left hip with palpable DP pulses bilaterally and intact sensation to light touch in the toes as well as 5 out of 5 dorsi and plantarflexion of the feet bilaterally.  X-ray obtained and concerning for dislocation of the left replaced hip on my read; no fracture present.  Orthopedic surgery was consulted.  I consented the patient for sedation with propofol and she was amenable.  Patient was given propofol to achieve deep sedation, had no apneic events or complications with this, and had successful relocation of the joint performed by orthopedic surgery.  X-ray confirmed appropriate placement and patient was given an abduction brace as recommended by orthopedic surgery, outpatient follow-up and return precautions.    I have reviewed the nursing notes. I have reviewed the findings, diagnosis, plan and need for follow up with the patient.    Discharge Medication List as of 4/14/2025  8:44 PM          Final diagnoses:   Anterior dislocation of left hip, initial encounter (H)       Ricardo Dixon MD  [facility] EMERGENCY DEPARTMENT  4/14/2025     Ricardo Dixon MD  04/21/25 0338

## 2025-04-15 RX ORDER — OXYCODONE HYDROCHLORIDE 5 MG/1
5-10 TABLET ORAL EVERY 4 HOURS PRN
Qty: 30 TABLET | Refills: 0 | Status: SHIPPED | OUTPATIENT
Start: 2025-04-15 | End: 2025-04-18

## 2025-04-17 DIAGNOSIS — Z96.642 S/P TOTAL LEFT HIP ARTHROPLASTY: ICD-10-CM

## 2025-04-17 RX ORDER — OXYCODONE HYDROCHLORIDE 5 MG/1
5-10 TABLET ORAL EVERY 4 HOURS PRN
Qty: 30 TABLET | Refills: 0 | Status: SHIPPED | OUTPATIENT
Start: 2025-04-17 | End: 2025-04-20

## 2025-04-17 RX ORDER — AMOXICILLIN 250 MG
1-2 CAPSULE ORAL 2 TIMES DAILY PRN
Qty: 30 TABLET | Refills: 0 | Status: SHIPPED | OUTPATIENT
Start: 2025-04-17

## 2025-04-17 NOTE — TELEPHONE ENCOUNTER
Called and talked with patient to check in. She is doing well and moving around more with walking. Trying to put weight on her whole leg instead of tip toeing. She is still needing the oxycodone and taking that appropriately. She will need a refill of Oxy and senna.  We also moved her appt from Tuesday to Thursday for Dr. Mohamud to see her.   She would like us to send a message to her PT to explain the dislocation.  Will send this to provider for meds and message.  Roxane Robertson RN

## 2025-04-22 NOTE — OP NOTE
OPERATIVE REPORT    DATE OF SERVICE: 4/22/25    SURGEON: Jr Mohamud MD.    ASSISTANT(S): Gwendolyn Montesinos PA-C The assistance of the Physician Assistant was necessary for positioning, draping, retraction, leg positioning for placement of implants, and layered closure. There was no qualified available resident or fellow who was aware of the intricies of the procedure and requirements of all portions of the procedure.  Mary Ulloa MD    PREOPERATIVE DIAGNOSIS:  Osteoarthritis    POSTOPERATIVE DIAGNOSIS:  Osteoarthritis    OPERATION PERFORMED:  left total hip arthroplasty    IMPLANTS:    Implant Name Type Inv. Item Serial No.  Lot No. LRB No. Used Action   IMP LINER S&N ACET R3 XLPE [id number] 0DEG [id number] - [id number] Total Joint Component/Insert IMP LINER S&N ACET R3 XLPE [id number] 0DEG [id number]  [employer] [id number] Left 1 Implanted   IMP SHELL SNR ACET R3 3H 48MM [id number] - [id number] Total Joint Component/Insert IMP SHELL SNR ACET R3 3H 48MM [id number]  [employer] [id number] Left 1 Implanted   IMP SCR ACET SNN SPHERICAL HEAD 6.5X40MM [id number] - [id number] [employer] IMP SCR ACET SNN SPHERICAL HEAD 6.5X40MM [id number]  [employer] [id number] Left 1 Implanted   IMP SCR ACET SNN SPHERICAL HEAD 6.5X20MM [id number] - [id number] [employer] IMP SCR ACET SNN SPHERICAL HEAD 6.5X20MM [id number]  [employer] [id number] Left 1 Implanted   IMP STEM S&N POLARSTEM STD TI/HA SZ2 NON JUAN FRANCISCO [id number] - [id number] Total Joint Component/Insert IMP STEM S&N POLARSTEM STD TI/HA SZ2 NON JUAN FRANCISCO [id number]  [employer] [id number] Left 1 Implanted   IMP HEAD FEMORAL SNR OXINIUM 12/14 32MM +4 [id number] - [id number] Total Joint Component/Insert IMP HEAD FEMORAL SNR OXINIUM 12/14 32MM +4 [id number]  [employer] [id number] Left 1 Implanted       ANESTHETIC: spinal     OPERATIVE FINDINGS:  End stage arthrosis of the hip    BLOOD LOSS XXX    COMPLICATIONS:  None  apparent    OPERATIVE INDICATIONS:  The patient has a long history of debilitating pain secondary to ostearthritis of the hip.  Despite comprehensive non-operative management these symptoms continued to interfere with activities of daily living.  After discussion of further treatment options including the risks and benefits that patient elected to proceed with a total hip.    DESCRIPTION OF THE PROCEDURE:  The patient was identified in the preoperative holding area.  The consent form including the risks and benefits were reviewed with the patient.  The operative limb was identified and marked.  The patient was brought back to the operating room and placed supine on the operating table.  An anesthetic was induced by the anesthesia team.   The patient was placed in the lateral decubitus position and prepped and draped in the normal standard fashion for a hip replacement.  A time-out was called.  Antibiotics were given.  We utilized an approximately 15 cm curvilinear incision, centered on the vastus ridge, and performed a standard posterior approach to the hip.  The tensor fascia was split.  A small portion of gluteus erika was split in line with its fibers.  The sciatic nerve was palpated.  The east-west retractor was placed.  The posterior border of gluteus medius was exposed and retracted.  The tendon of piriformis and that of the obturators was released from their attachments.  A trapdoor posterior capsulotomy was performed.  The hip was dislocated.  The lesser trochanter was exposed.  A ruler was used to measure and electrocautery was used to leonid our neck cut as preoperatively templated.  The head was measured with a caliper and found to be 45.  This measurement was used to choose our first reamer.  The neck cut was re-measured. The femur was elevated.  A Hohmann was placed over the anterior rim of the acetabulum and the femur was subluxed anterior.  A split was made in the inferior capsule.  The transverse  "acetabular ligament was left intact and used a guide for the anterversion of the acetabular component.  Circumferential retractors were placed.  We began reaming and went up by two until sufficient contact was made with the acetabular rim.  We then went up by one millimeter for a one millimeter press-fit.  We were within one size of our preoperative plan.   A trail was placed.  It had an excellent press fit.  We then placed out final component in 40 degrees of inclination and approximately 20 degrees of anteversion, parallel to the transverse ligament.  The press fit was excellent.  Screws were placed for additional initial fixation.  A flat liner was then placed. It locked into place.  Attention was turned to the femur.  Retractors were placed to elevated the proximal femur and to protect the tendon of gluteus medius.  Remaining lateral neck was removed and the piriformis fossa was cleared of soft-tissue.  A box osteotome and canal finder were used to prepare for broaching.  A sharp broach was used to lateralize slightly.  We then broached up sequentially to a size 2.  It was rotationally stable and sat up 1-2 millimeters from the neck-cut.  Preoperatively the patient had templated to a standard offset stem.  The standard offset stem appropriately tensioned the abductors.  We trialed the following femoral heads: 0 and +4.  The +4 appropriately tensioned the abductors and clinically equalized the leg-lengths.  The stability exam was excellent.  The hip was stable and there was no impingement posteriorly with hyper-extension and maximal external rotation.  With full extension, the knee could be fixed to bring the foot nearly to the buttock.  With the hip in ninety degrees of flexion and neutral rotation there was greater than 60 degrees of internal rotation before subluxation.  There appropriate movement with a \"luis carlos\" test.  Happy with our stability exam, the final implant was placed in approximately twenty degrees " of anteversion.  It sat within 1 mm of the broach.  We then trailed with a +4 head.  The stability exam was identical.  We then placed the final head on a clean, dry neck and impacted it into place. The hip was reduced after directly visualizing the entire acetabulum.  The wound was then irrigated.  The posterior capsule was repaired to the anterior capsule and and short external rotators were sutured to their anatomic attachment on the greater trochanter with non-absorbable suture through bone tunnels.The fascia was closed with interrupted Vicryl, the dermis with interrupted Vicryl, and skin with running monocryl, Dermabond and steri-strips.  At the end of the procedure the sponge and needle counts were correct times two.  The patient tolerated the procedure well and returned to the PAR extubated and stable.    POSTOPERATIVE PLAN:  1. Weight bearing as tolerated  2. Standard posterior hip precautions  3. DVT prophylaxis   4. 24 hours of prophylactic antibiotics  5. Follow-up:  Wound clinic in 2 weeks and with [facility] in clinic in 6 weeks for x-rays and a rehabilitation check.

## 2025-04-23 ENCOUNTER — HOSPITAL ENCOUNTER (OUTPATIENT)
Facility: CLINIC | Age: 40
Setting detail: OBSERVATION
LOS: 1 days | Discharge: HOME | End: 2025-04-24
Attending: EMERGENCY MEDICINE | Admitting: ORTHOPAEDIC SURGERY
Payer: COMMERCIAL

## 2025-04-23 ENCOUNTER — PROCEDURE ONLY VISIT (OUTPATIENT)
Dept: OTHER | Facility: CLINIC | Age: 40
End: 2025-04-23

## 2025-04-23 ENCOUNTER — APPOINTMENT (OUTPATIENT)
Dept: GENERAL RADIOLOGY | Facility: CLINIC | Age: 40
End: 2025-04-23
Payer: COMMERCIAL

## 2025-04-23 ENCOUNTER — APPOINTMENT (OUTPATIENT)
Dept: CT IMAGING | Facility: CLINIC | Age: 40
End: 2025-04-23
Attending: EMERGENCY MEDICINE
Payer: COMMERCIAL

## 2025-04-23 DIAGNOSIS — M25.552 HIP PAIN, LEFT: Primary | ICD-10-CM

## 2025-04-23 DIAGNOSIS — M24.452 RECURRENT DISLOCATION OF LEFT HIP: ICD-10-CM

## 2025-04-23 DIAGNOSIS — T84.021A DISLOCATION OF INTERNAL LEFT HIP PROSTHESIS, INITIAL ENCOUNTER: Primary | ICD-10-CM

## 2025-04-23 LAB
ANION GAP SERPL CALCULATED.3IONS-SCNC: 16 MMOL/L (ref 7–15)
ATRIAL RATE - MUSE: 83 BPM
BASOPHILS # BLD AUTO: 0.1 10E3/UL (ref 0–0.2)
BASOPHILS NFR BLD AUTO: 0 %
BUN SERPL-MCNC: 11 MG/DL (ref 6–20)
CALCIUM SERPL-MCNC: 10.3 MG/DL (ref 8.8–10.4)
CHLORIDE SERPL-SCNC: 101 MMOL/L (ref 98–107)
CREAT SERPL-MCNC: 0.75 MG/DL (ref 0.51–0.95)
DIASTOLIC BLOOD PRESSURE - MUSE: NORMAL MMHG
EGFRCR SERPLBLD CKD-EPI 2021: >90 ML/MIN/1.73M2
EOSINOPHIL # BLD AUTO: 0.1 10E3/UL (ref 0–0.7)
EOSINOPHIL NFR BLD AUTO: 1 %
ERYTHROCYTE [DISTWIDTH] IN BLOOD BY AUTOMATED COUNT: 12.9 % (ref 10–15)
GLUCOSE SERPL-MCNC: 123 MG/DL (ref 70–99)
HCG SERPL QL: NEGATIVE
HCO3 SERPL-SCNC: 19 MMOL/L (ref 22–29)
HCT VFR BLD AUTO: 35.4 % (ref 35–47)
HGB BLD-MCNC: 11.6 G/DL (ref 11.7–15.7)
IMM GRANULOCYTES # BLD: 0.1 10E3/UL
IMM GRANULOCYTES NFR BLD: 1 %
INTERPRETATION ECG - MUSE: NORMAL
LYMPHOCYTES # BLD AUTO: 1.8 10E3/UL (ref 0.8–5.3)
LYMPHOCYTES NFR BLD AUTO: 12 %
MCH RBC QN AUTO: 31 PG (ref 26.5–33)
MCHC RBC AUTO-ENTMCNC: 32.8 G/DL (ref 31.5–36.5)
MCV RBC AUTO: 95 FL (ref 78–100)
MONOCYTES # BLD AUTO: 0.7 10E3/UL (ref 0–1.3)
MONOCYTES NFR BLD AUTO: 5 %
NEUTROPHILS # BLD AUTO: 12.3 10E3/UL (ref 1.6–8.3)
NEUTROPHILS NFR BLD AUTO: 82 %
NRBC # BLD AUTO: 0 10E3/UL
NRBC BLD AUTO-RTO: 0 /100
P AXIS - MUSE: 62 DEGREES
PLATELET # BLD AUTO: 692 10E3/UL (ref 150–450)
POTASSIUM SERPL-SCNC: 4.2 MMOL/L (ref 3.4–5.3)
PR INTERVAL - MUSE: 134 MS
QRS DURATION - MUSE: 74 MS
QT - MUSE: 400 MS
QTC - MUSE: 470 MS
R AXIS - MUSE: 12 DEGREES
RBC # BLD AUTO: 3.74 10E6/UL (ref 3.8–5.2)
SODIUM SERPL-SCNC: 136 MMOL/L (ref 135–145)
SYSTOLIC BLOOD PRESSURE - MUSE: NORMAL MMHG
T AXIS - MUSE: 2 DEGREES
VENTRICULAR RATE- MUSE: 83 BPM
WBC # BLD AUTO: 15.1 10E3/UL (ref 4–11)

## 2025-04-23 PROCEDURE — 36415 COLL VENOUS BLD VENIPUNCTURE: CPT | Performed by: EMERGENCY MEDICINE

## 2025-04-23 PROCEDURE — 120N000002 HC R&B MED SURG/OB UMMC

## 2025-04-23 PROCEDURE — 80048 BASIC METABOLIC PNL TOTAL CA: CPT | Performed by: EMERGENCY MEDICINE

## 2025-04-23 PROCEDURE — 27265 TREAT HIP DISLOCATION: CPT | Mod: LT

## 2025-04-23 PROCEDURE — 93005 ELECTROCARDIOGRAM TRACING: CPT | Mod: 59 | Performed by: EMERGENCY MEDICINE

## 2025-04-23 PROCEDURE — 96375 TX/PRO/DX INJ NEW DRUG ADDON: CPT | Performed by: EMERGENCY MEDICINE

## 2025-04-23 PROCEDURE — 250N000013 HC RX MED GY IP 250 OP 250 PS 637

## 2025-04-23 PROCEDURE — 73700 CT LOWER EXTREMITY W/O DYE: CPT | Mod: LT

## 2025-04-23 PROCEDURE — 99285 EMERGENCY DEPT VISIT HI MDM: CPT | Mod: 25 | Performed by: EMERGENCY MEDICINE

## 2025-04-23 PROCEDURE — 73700 CT LOWER EXTREMITY W/O DYE: CPT | Mod: 26 | Performed by: STUDENT IN AN ORGANIZED HEALTH CARE EDUCATION/TRAINING PROGRAM

## 2025-04-23 PROCEDURE — 999N000065 XR PELVIS AND HIP PORTABLE LEFT 1 VIEW

## 2025-04-23 PROCEDURE — 73501 X-RAY EXAM HIP UNI 1 VIEW: CPT | Mod: 26 | Performed by: RADIOLOGY

## 2025-04-23 PROCEDURE — 99156 MOD SED OTH PHYS/QHP 5/>YRS: CPT | Performed by: EMERGENCY MEDICINE

## 2025-04-23 PROCEDURE — 99157 MOD SED OTHER PHYS/QHP EA: CPT | Performed by: EMERGENCY MEDICINE

## 2025-04-23 PROCEDURE — 73502 X-RAY EXAM HIP UNI 2-3 VIEWS: CPT | Mod: 26 | Performed by: RADIOLOGY

## 2025-04-23 PROCEDURE — 73502 X-RAY EXAM HIP UNI 2-3 VIEWS: CPT

## 2025-04-23 PROCEDURE — 84703 CHORIONIC GONADOTROPIN ASSAY: CPT | Performed by: EMERGENCY MEDICINE

## 2025-04-23 PROCEDURE — 250N000011 HC RX IP 250 OP 636: Mod: JZ | Performed by: EMERGENCY MEDICINE

## 2025-04-23 PROCEDURE — 99255 IP/OBS CONSLTJ NEW/EST HI 80: CPT | Performed by: PHYSICIAN ASSISTANT

## 2025-04-23 PROCEDURE — 96376 TX/PRO/DX INJ SAME DRUG ADON: CPT | Mod: 59 | Performed by: EMERGENCY MEDICINE

## 2025-04-23 PROCEDURE — 85004 AUTOMATED DIFF WBC COUNT: CPT | Performed by: EMERGENCY MEDICINE

## 2025-04-23 PROCEDURE — 96374 THER/PROPH/DIAG INJ IV PUSH: CPT | Mod: 59 | Performed by: EMERGENCY MEDICINE

## 2025-04-23 PROCEDURE — 93010 ELECTROCARDIOGRAM REPORT: CPT | Mod: 59 | Performed by: EMERGENCY MEDICINE

## 2025-04-23 RX ORDER — HYDROMORPHONE HYDROCHLORIDE 1 MG/ML
0.5 INJECTION, SOLUTION INTRAMUSCULAR; INTRAVENOUS; SUBCUTANEOUS ONCE
Status: COMPLETED | OUTPATIENT
Start: 2025-04-23 | End: 2025-04-23

## 2025-04-23 RX ORDER — ACETAMINOPHEN 325 MG/1
975 TABLET ORAL 3 TIMES DAILY
Status: DISCONTINUED | OUTPATIENT
Start: 2025-04-23 | End: 2025-04-24 | Stop reason: HOSPADM

## 2025-04-23 RX ORDER — METHOCARBAMOL 750 MG/1
750 TABLET, FILM COATED ORAL 3 TIMES DAILY
Status: DISCONTINUED | OUTPATIENT
Start: 2025-04-23 | End: 2025-04-24 | Stop reason: HOSPADM

## 2025-04-23 RX ORDER — PROCHLORPERAZINE MALEATE 5 MG/1
10 TABLET ORAL EVERY 6 HOURS PRN
Status: DISCONTINUED | OUTPATIENT
Start: 2025-04-23 | End: 2025-04-24 | Stop reason: HOSPADM

## 2025-04-23 RX ORDER — PROPOFOL 10 MG/ML
40 INJECTION, EMULSION INTRAVENOUS ONCE
Status: COMPLETED | OUTPATIENT
Start: 2025-04-23 | End: 2025-04-23

## 2025-04-23 RX ORDER — HYDROMORPHONE HYDROCHLORIDE 2 MG/1
2 TABLET ORAL EVERY 4 HOURS PRN
Status: DISCONTINUED | OUTPATIENT
Start: 2025-04-23 | End: 2025-04-24 | Stop reason: HOSPADM

## 2025-04-23 RX ORDER — HYDROMORPHONE HCL IN WATER/PF 6 MG/30 ML
0.2 PATIENT CONTROLLED ANALGESIA SYRINGE INTRAVENOUS
Status: DISCONTINUED | OUTPATIENT
Start: 2025-04-23 | End: 2025-04-24 | Stop reason: HOSPADM

## 2025-04-23 RX ORDER — POLYETHYLENE GLYCOL 3350 17 G/17G
17 POWDER, FOR SOLUTION ORAL DAILY PRN
Status: DISCONTINUED | OUTPATIENT
Start: 2025-04-23 | End: 2025-04-24 | Stop reason: HOSPADM

## 2025-04-23 RX ORDER — HYDROMORPHONE HCL IN WATER/PF 6 MG/30 ML
0.4 PATIENT CONTROLLED ANALGESIA SYRINGE INTRAVENOUS
Status: DISCONTINUED | OUTPATIENT
Start: 2025-04-23 | End: 2025-04-24 | Stop reason: HOSPADM

## 2025-04-23 RX ORDER — ONDANSETRON 4 MG/1
4 TABLET, ORALLY DISINTEGRATING ORAL EVERY 6 HOURS PRN
Status: DISCONTINUED | OUTPATIENT
Start: 2025-04-23 | End: 2025-04-24 | Stop reason: HOSPADM

## 2025-04-23 RX ORDER — PROPOFOL 10 MG/ML
INJECTION, EMULSION INTRAVENOUS
Status: DISCONTINUED
Start: 2025-04-23 | End: 2025-04-23 | Stop reason: HOSPADM

## 2025-04-23 RX ORDER — HYDROMORPHONE HYDROCHLORIDE 2 MG/1
4 TABLET ORAL EVERY 4 HOURS PRN
Status: DISCONTINUED | OUTPATIENT
Start: 2025-04-23 | End: 2025-04-24 | Stop reason: HOSPADM

## 2025-04-23 RX ORDER — AMOXICILLIN 250 MG
1-2 CAPSULE ORAL 2 TIMES DAILY
Status: DISCONTINUED | OUTPATIENT
Start: 2025-04-23 | End: 2025-04-24 | Stop reason: HOSPADM

## 2025-04-23 RX ORDER — ONDANSETRON 2 MG/ML
4 INJECTION INTRAMUSCULAR; INTRAVENOUS EVERY 6 HOURS PRN
Status: DISCONTINUED | OUTPATIENT
Start: 2025-04-23 | End: 2025-04-24 | Stop reason: HOSPADM

## 2025-04-23 RX ADMIN — HYDROMORPHONE HYDROCHLORIDE 0.5 MG: 1 INJECTION, SOLUTION INTRAMUSCULAR; INTRAVENOUS; SUBCUTANEOUS at 14:56

## 2025-04-23 RX ADMIN — ACETAMINOPHEN 975 MG: 325 TABLET, FILM COATED ORAL at 21:25

## 2025-04-23 RX ADMIN — SENNOSIDES AND DOCUSATE SODIUM 2 TABLET: 50; 8.6 TABLET ORAL at 21:25

## 2025-04-23 RX ADMIN — HYDROMORPHONE HYDROCHLORIDE 0.5 MG: 1 INJECTION, SOLUTION INTRAMUSCULAR; INTRAVENOUS; SUBCUTANEOUS at 14:28

## 2025-04-23 RX ADMIN — PROPOFOL 240 MG: 10 INJECTION, EMULSION INTRAVENOUS at 15:35

## 2025-04-23 RX ADMIN — HYDROMORPHONE HYDROCHLORIDE 0.5 MG: 1 INJECTION, SOLUTION INTRAMUSCULAR; INTRAVENOUS; SUBCUTANEOUS at 13:44

## 2025-04-23 RX ADMIN — METHOCARBAMOL 750 MG: 750 TABLET ORAL at 21:25

## 2025-04-23 RX ADMIN — HYDROMORPHONE HYDROCHLORIDE 0.5 MG: 1 INJECTION, SOLUTION INTRAMUSCULAR; INTRAVENOUS; SUBCUTANEOUS at 14:12

## 2025-04-23 ASSESSMENT — ACTIVITIES OF DAILY LIVING (ADL)
ADLS_ACUITY_SCORE: 42

## 2025-04-23 ASSESSMENT — COLUMBIA-SUICIDE SEVERITY RATING SCALE - C-SSRS
6. HAVE YOU EVER DONE ANYTHING, STARTED TO DO ANYTHING, OR PREPARED TO DO ANYTHING TO END YOUR LIFE?: NO
1. IN THE PAST MONTH, HAVE YOU WISHED YOU WERE DEAD OR WISHED YOU COULD GO TO SLEEP AND NOT WAKE UP?: NO
2. HAVE YOU ACTUALLY HAD ANY THOUGHTS OF KILLING YOURSELF IN THE PAST MONTH?: NO

## 2025-04-23 NOTE — CONSULTS
Orthopaedic Surgery Consultation  DATE OF CONSULT: 2025 2:30 PM    REQUESTING PROVIDER: Joce Block MD, MD - YOLI Hall.    CC: left hip dislocation    DATE OF INJURY: 25  DATE OF ADMISSION: 2025  IMPRESSION:   Quiana Shepherd is a 39 year old female status-post left total hip arthroplasty on 25 with the followin. Left hip anterior dislocation, second occurrence since surgery, successfully reduced in ED under sedation.     RECOMMENDATIONS:   -Obtain CT of the left hip without contrast for further work-up of her instability.   -Avoid any hyperextension of the hip. Continue hip abduction brace for support.   - Activity: Avoid any hyperextension of the hip. Continue posterior hip precautions as well. Hip will be more stable in a flexed position.  - Weight bearing: Okay to WBAT.   - Pain control as previous with oral APAP, oxycodone, icing and rest. Continue ASA 81mg BID for DVT prophylaxis.   - Follow-up: will adjust her clinic appointment to a virtual to review results of CT and further discuss next steps and treatment options.   - Disposition: Okay for discharge to home from Orthopedics standpoint. Will initiate order of in-home PT.       Patient was seen and evaluated with Dr. Mohamud, Orthopedic Surgery attending.     Gwendolyn Montesinos PA-C  2025 5:10 PM  Orthopaedic Surgery     Thank you for allowing me to participate in this patient's care. Please page me directly any questions/concerns.   Securely message with the Vocera Web Console (learn more here)  Text page via [email or web address] [email or web address]    If there is no response, if it is a weekend, or if it is during evening hours, please page the orthopaedic surgery resident on call via [email or web address] Paging/[email or web address]             HISTORY OF PRESENT ILLNESS:   The orthopaedic surgery service was consulted by Joce Werner MD for evaluation and treatment recommendations of left hip pain and radiographs demonstrating recurrent left hip  dislocation.    Quiana Shepherd is a 39 year old who is now 2 weeks from left total hip arthroplasty for osteoarthritis in the setting of previous acetabular impingement with CAM lesion and labral tear. She was in the ED on 4/14 for hip dislocation that was treated with closed reduction under conscious sedation. She and her partner report since that time, she was progressing 'very well', weaning from opioids and able to walk longer distances with her walker. Earlier today, she was attempting to transfer into her car. Her operative leg was extended in front of her and when she transferred weight there was slight internal rotation and she felt a pop followed by immediate onset of severe pain. EMS was called and she was transported to ED where x-rays were obtained and confirmed partial anterior hip dislocation similar to previous incident. She state this time it is much more painful, primarily in the groin. She denies new numbness or tingling outside of some diminished sensitivity she's had since surgery over the left anterior shin.       Denies numbness, tingling, or weakness to the affected extremities.  Denies denies chest pain or shortness of breath. Denies nausea.     PAST MEDICAL HISTORY:   Past Medical History:   Diagnosis Date    Acne vulgaris 10/26/2017    Hirsutism     Osteoarthritis of left hip joint due to dysplasia      [Patient denies any personal history of bleeding disorders, clotting disorders, or adverse reactions to anesthesia].    PAST SURGICAL HISTORY:    Past Surgical History:   Procedure Laterality Date    ARTHROPLASTY HIP Left 4/9/2025    Procedure: Left total hip arthroplasty;  Surgeon: Jr Mohamud MD;  Location:  OR    BACK SURGERY  1993    Surgery to install halo    ORTHOPEDIC SURGERY  2011    Left hip - TIFFANIE repair       MEDICATIONS:   Anticoagulants: ASA 162mg daily for postoperative DVT prophylaxis    Prior to Admission medications    Medication Sig Last Dose Taking? Auth Provider  Long Term End Date   acetaminophen (TYLENOL) 325 MG tablet Take 2 tablets (650 mg) by mouth every 4 hours as needed for other (mild pain).   Gwendolyn Montesinos PA-C     aspirin 81 MG EC tablet Take 1 tablet (81 mg) by mouth 2 times daily.   Gwendolyn Montesinos PA-C     cholecalciferol (VITAMIN D3) 25 mcg (1000 units) capsule Take 1 capsule by mouth daily.   Reported, Patient     Cyanocobalamin (B-12) 1000 MCG TBCR Take by mouth.   Reported, Patient     dapsone (ACZONE) 7.5 % gel Apply topically daily.   Delfina Rowley PA-C     eflornithine HCl 13.9 % CREA Externally apply topically daily.   Delfina Rowley PA-C     eflornithine HCl 13.9 % CREA Externally apply topically daily.   Delfina Rowley PA-C     levonorgestrel (MIRENA) 20 MCG/24HR IUD 1 each by Intrauterine route once   Reported, Patient Yes    ondansetron (ZOFRAN ODT) 4 MG ODT tab Take 1 tablet (4 mg) by mouth every 8 hours as needed for nausea.   Gwendolny Montesinos PA-C     oxyCODONE (ROXICODONE) 5 MG tablet Take 1-2 tablets (5-10 mg) by mouth every 4 hours as needed for pain.   Gwendolyn Montesinos PA-C     oxyCODONE (ROXICODONE) 5 MG tablet Take 1-2 tablets (5-10 mg) by mouth every 4 hours as needed for moderate to severe pain.   Gwendolyn Montesinos PA-C     senna-docusate (SENOKOT-S/PERICOLACE) 8.6-50 MG tablet Take 1-2 tablets by mouth 2 times daily as needed for constipation. Take while on oral narcotics to prevent or treat constipation.   Vianey Lauren PA-C     spironolactone (ALDACTONE) 100 MG tablet Take 2 tablets (200 mg) by mouth daily.  Patient taking differently: Take 200 mg by mouth every morning.   Delfina Rowley PA-C Yes    tretinoin (RETIN-A) 0.025 % external cream Use every night   Delfina Rowley PA-C     tretinoin (RETIN-A) 0.05 % external cream Apply topically at bedtime.   Delfina Rowley, CAROLYNE         ALLERGIES:   Contrast dye    SOCIAL HISTORY:   Social History     Socioeconomic History    Marital status: Single     Spouse  name: Not on file    Number of children: Not on file    Years of education: Not on file    Highest education level: Not on file   Occupational History    Not on file   Tobacco Use    Smoking status: Never    Smokeless tobacco: Never   Substance and Sexual Activity    Alcohol use: Yes     Alcohol/week: 9.0 standard drinks of alcohol     Types: 9 Standard drinks or equivalent per week    Drug use: Yes     Types: Marijuana     Comment: Vape or edibles    Sexual activity: Yes     Partners: Male     Birth control/protection: I.U.D.   Other Topics Concern    Parent/sibling w/ CABG, MI or angioplasty before 65F 55M? No   Social History Narrative    Not on file     Social Drivers of Health     Financial Resource Strain: Not on file   Food Insecurity: Not on file   Transportation Needs: Not on file   Physical Activity: Not on file   Stress: Not on file   Social Connections: Not on file   Interpersonal Safety: Low Risk  (2025)    Interpersonal Safety     Do you feel physically and emotionally safe where you currently live?: Yes     Within the past 12 months, have you been hit, slapped, kicked or otherwise physically hurt by someone?: No     Within the past 12 months, have you been humiliated or emotionally abused in other ways by your partner or ex-partner?: No   Housing Stability: Not on file       FAMILY HISTORY:  Family History   Problem Relation Age of Onset    Cancer Mother         melanoma    Melanoma Mother     Osteoporosis Mother     Thyroid Disease Mother     Coronary Artery Disease Father          of heart attack at age 79    Hypertension Father     Prostate Cancer Father     Thyroid Disease Sister     Diabetes Maternal Grandmother     Cerebrovascular Disease Maternal Grandmother     Breast Cancer Maternal Grandmother     Depression Maternal Grandmother     Mental Illness Maternal Grandmother     Coronary Artery Disease Maternal Grandfather          of a heart attack in his 50s    Breast Cancer Paternal  Grandmother     Substance Abuse Paternal Grandfather     Cancer Other         Maternal aunt has a hx of melanoma    Melanoma Other         maternal aunt    Skin Cancer No family hx of     Anesthesia Reaction No family hx of     Bleeding Disorder No family hx of     Clotting Disorder No family hx of        Patient denies known family history of bleeding, clotting, or anesthesia related complications.     REVIEW OF SYSTEMS:   Patient denies any new numbness or tingling beside baseline numbness to anterior shin since surgery.     PHYSICAL EXAM:   Vitals:    04/23/25 1604 04/23/25 1605 04/23/25 1610 04/23/25 1615   BP:  114/76 114/81 114/87   Pulse: 74 80 83 87   Resp:       Temp:       TempSrc:       SpO2: 100% 100% 98% 100%   Height:         General: Awake, alert, appropriate, following commands, tearful.   Neuro: EOM grossly intact  Skin: No rashes,  skin color normal.  HEENT: Normal.   Lungs: Breathing comfortably and nonlabored, no wheezes or stridor noted.  Heart/Cardiovascular: Regular pulse, no peripheral cyanosis.  Abdomen: Soft, non-distended.       Right Lower Extremity:   - No gross deformity, skin intact.  - No significant tenderness to palpation throughout the limb.  - Motor intact distally TA, GSC, EHL, FHL with 5/5 strength.  - SILT sp, dp, sa, prakash, ti n. Distributions.   - DP/PT 2+ pulses palpable, toes warm and well perfused.      Left Lower Extremity:   - Mild shortening with external rotation of the limb.   - Tender with palpation anterior groin, quadriceps muscle. Nontender knee, calf or ankle. Pain in hip with active ankle DF prior to hip reduction. Full ankle ROM with 5/5 strength and 5/5 EHL post reduction. Some isometric quad contraction visible.  - SILT sp, dp, sa, ti nerve distributions. Some partial decreased sensitivity along medial saphenous nerve distribution   - DP/PT 2+ pulses palpable, toes warm and well perfused. Pulses also confirmed with doppler.       LABS:    No results found for:  "\"INR\"    CBC:  Lab Results   Component Value Date    WBC 15.1 (H) 04/23/2025    HGB 11.6 (L) 04/23/2025     (H) 04/23/2025     Hemoglobin   Date Value Ref Range Status   04/23/2025 11.6 (L) 11.7 - 15.7 g/dL Final   04/14/2025 11.8 11.7 - 15.7 g/dL Final       BMP:  Lab Results   Component Value Date     04/23/2025    POTASSIUM 4.2 04/23/2025    CHLORIDE 101 04/23/2025    CO2 19 (L) 04/23/2025    BUN 11.0 04/23/2025    CR 0.75 04/23/2025    ANIONGAP 16 (H) 04/23/2025    DIANE 10.3 04/23/2025     (H) 04/23/2025       Inflammatory Markers:  Lab Results   Component Value Date    WBC 15.1 (H) 04/23/2025    SED 8 12/23/2022       Cultures:  No results for input(s): \"CULT\" in the last 168 hours.    IMAGING:  All imaging independently reviewed.  AP pelvis, cross table lateral, AP femur films were reviewed.     Impression:  1. Left hip, anterior dislocation of the femoral stem component of the  left total hip arthroplasty.  2. Mild to moderate degenerative change of the right hip.    AP Pelvis and cross table lateral left hip were repeated post procedure.                                                        IMPRESSION: Interval reduction of previous left hip arthroplasty dislocation with satisfactory alignment. No acute fracture. Mild degenerative arthritis right hip joint.    Procedure:  DATE OF PROCEDURE: 5/28/2020     PROVIDER: Jr Escobar PA-C    ANESTHESIA: conscious sedation per ED provider    INDICATION: posterior dislocation of right total hip arthroplasty    POSTPROCEDURE DIAGNOSIS: successful reduction of right total hip arthroplasty    PROCEDURE(S) PERFORMED: closed reduction of right total hip arthroplasty    BACKGROUND:  Patient with bilateral total hip arthroplasty (s/p RADHA - right in 2018, left in 9/2019; Dr. Mohamud) who presents with atraumatic dislocation of right total hip arthroplasty.    PROCEDURE FINDINGS: right lower extremity shortened approximately 6 cm    PROCEDURE:    After " verbal informed consent was obtained and the patient elected to proceed, a brief time out was held in accordance with hospital policy confirming the correct patient, procedure, site, and side.    A smooth induction of procedural sedation was undertaken by the ED provider.    Once adequate analgesia had taken effect, the patient was positioned supine with one assistant applying downward pressure directly on the ASIS.  Standing on the patient's  left side, the left lower extremity traction was pulled with alternating external then internal rotation of the hip. A palpable clunk was felt.  The left hip was in gross anatomic position. The patient was monitored while recovering from sedation. She tolerated the procedure well and no immediate complications were observed. CMS was tested and found to be intact following the procedure. This was performed by Dr. Mohamud.

## 2025-04-23 NOTE — SEDATION DOCUMENTATION
Procedure started at 1537, time out completed, propofol used for sedation. Initial dose of 40mg propofol given at 1538, with 20mg increments given until final push of 40mg at 1554 for a total of 240mg propofol total. Ortho able to relocate hip at 1555. Repeat x-ray confirms placement of hip. Pt awake but drowsy and reporting improvement of pain to 3/10.

## 2025-04-23 NOTE — ED NOTES
Bed: ED06  Expected date:   Expected time:   Means of arrival:   Comments:  [facility] [address] 39F new hip was dislocated

## 2025-04-23 NOTE — ED TRIAGE NOTES
"[facility] from [facility].   Hip replacement 2 weeks ago, 2nd dislocation since. Was getting into the car,m felt it \"turn in\". Leg brace on.     100mg Fentanyl IM via EMS. RAC PIV 20g. Vitally stable. 2mg SL Zofran.         "

## 2025-04-23 NOTE — ED PROVIDER NOTES
"    [place] EMERGENCY DEPARTMENT ([facility])    4/23/25       ED PROVIDER NOTE   History     Chief Complaint   Patient presents with    Dislocation     The history is provided by the patient and medical records.     Quiana Shepherd is a 39 year old female with a PMH of osteoarthritis of left hip joint due to dysplasia s/p total left hip arthroplasty (4/9/25) who presents to the ED BIBA with left hip pain. Patient states getting into car for an appointment earlier this morning. She says that she was going to the back seat and was in her brace where she moved laterally \"one inch\" and felt her left hip dislocate. Patient endorses a lot of pain and it is more discomforting than the first time she came to ED for left hip pain. She endorses not being able to feel her left shin after surgery. Patient last ate at 9:30 AM and took sips of fluids shortly after dislocating her hip. Per EMS, patient was administered IM Fentanyl 100 mg, RAC PIV 20g and 2mg SL Zofran. Patient denies fall or trauma.     Per chart review, patient had a total left hip arthroplasty done on 04/09/2025 due to osteoarthritis of left hip. Patient was seen in the ED on 04/14/2025 for concern of left hip dislocation post op. X-ray obtained and concerning for dislocation of the left replaced hip on my read; no fracture present. Patient was given propofol to achieve deep sedation, had no apneic events or complications with this, and had successful relocation of the joint performed by orthopedic surgery.      Past Medical History  Past Medical History:   Diagnosis Date    Acne vulgaris 10/26/2017    Hirsutism     Osteoarthritis of left hip joint due to dysplasia      Past Surgical History:   Procedure Laterality Date    ARTHROPLASTY HIP Left 4/9/2025    Procedure: Left total hip arthroplasty;  Surgeon: Jr Mohamud MD;  Location:  OR    BACK SURGERY  1993    Surgery to install halo    ORTHOPEDIC SURGERY  2011    Left hip - TIFFANIE repair "     acetaminophen (TYLENOL) 325 MG tablet  aspirin 81 MG EC tablet  cholecalciferol (VITAMIN D3) 25 mcg (1000 units) capsule  Cyanocobalamin (B-12) 1000 MCG TBCR  dapsone (ACZONE) 7.5 % gel  eflornithine HCl 13.9 % CREA  eflornithine HCl 13.9 % CREA  levonorgestrel (MIRENA) 20 MCG/24HR IUD  ondansetron (ZOFRAN ODT) 4 MG ODT tab  oxyCODONE (ROXICODONE) 5 MG tablet  oxyCODONE (ROXICODONE) 5 MG tablet  senna-docusate (SENOKOT-S/PERICOLACE) 8.6-50 MG tablet  spironolactone (ALDACTONE) 100 MG tablet  tretinoin (RETIN-A) 0.025 % external cream  tretinoin (RETIN-A) 0.05 % external cream      Allergies   Allergen Reactions    Contrast Dye Other (See Comments)     Notes thyroid swelling after a contrast injection for a lumbar LUISA     Family History  Family History   Problem Relation Age of Onset    Cancer Mother         melanoma    Melanoma Mother     Osteoporosis Mother     Thyroid Disease Mother     Coronary Artery Disease Father          of heart attack at age 79    Hypertension Father     Prostate Cancer Father     Thyroid Disease Sister     Diabetes Maternal Grandmother     Cerebrovascular Disease Maternal Grandmother     Breast Cancer Maternal Grandmother     Depression Maternal Grandmother     Mental Illness Maternal Grandmother     Coronary Artery Disease Maternal Grandfather          of a heart attack in his 50s    Breast Cancer Paternal Grandmother     Substance Abuse Paternal Grandfather     Cancer Other         Maternal aunt has a hx of melanoma    Melanoma Other         maternal aunt    Skin Cancer No family hx of     Anesthesia Reaction No family hx of     Bleeding Disorder No family hx of     Clotting Disorder No family hx of      Social History   Social History     Tobacco Use    Smoking status: Never    Smokeless tobacco: Never   Substance Use Topics    Alcohol use: Yes     Alcohol/week: 9.0 standard drinks of alcohol     Types: 9 Standard drinks or equivalent per week    Drug use: Yes     Types:  "Marijuana     Comment: Vape or edibles      A medically appropriate review of systems was performed with pertinent positives and negatives noted in the HPI, and all other systems negative.    Physical Exam   BP: 125/90  Pulse: 80  Temp: 98.3  F (36.8  C)  Resp: 26  Height: 170.2 cm (5' 7\")  SpO2: 100 %  Physical Exam  Vitals and nursing note reviewed.       General: awake, alert, patient is anxious, tearful  Head: normal cephalic  Neck: Supple  CV: regular rate   Lungs: Breathing comfortably on room  Abd: soft, non-tender, no guarding, no peritoneal signs  EXT: Left extremity is held in straight position.  CNS is intact.  Strong peripheral pulses.  Sensation to light touch is intact  Neuro: awake, answers questions appropriately. No focal deficits noted      ED Course, Procedures, & Data      [facility]    Procedure: Sedation    Date/Time: 4/23/2025 7:11 PM    Performed by: Joce Block MD  Authorized by: Joce Block MD    Risks, benefits and alternatives discussed.    ED EVALUATION:      I have performed an Emergency Department Evaluation including taking a history and physical examination, this evaluation will be documented in the electronic medical record for this ED encounter.      ASA Class: Class 1- healthy patient    Mallampati: Grade 1- soft palate, uvula, tonsillar pillars, and posterior pharyngeal wall visible    UNIVERSAL PROTOCOL   Site Marked: NA  Prior Images Obtained and Reviewed:  Yes  Required items: Required blood products, implants, devices and special equipment available    Patient identity confirmed:  Verbally with patient and arm band  Patient was reevaluated immediately before administering moderate or deep sedation or anesthesia  Confirmation Checklist:  Patient's identity using two indicators, relevant allergies, procedure was appropriate and matched the consent or emergent situation and correct equipment/implants were " available  Time out: Immediately prior to the procedure a time out was called    Universal Protocol: the Joint Commission Universal Protocol was followed      SEDATION  Patient Sedated: Yes    Sedation:  Propofol  Vital signs: Vital signs monitored during sedation      PROCEDURE    Patient Tolerance:  Patient tolerated the procedure well with no immediate complications  Length of time physician/provider present for 1:1 monitoring during sedation: 25            Results for orders placed or performed during the hospital encounter of 04/23/25   XR Pelvis w Hip Port Left G/E 2 Views     Status: None    Narrative    4 views pelvis radiograph(s) 4/23/2025 2:48 PM    History: Concern for LEFT Hip dislocation    Comparison: 4/14/2025    Findings:    4 view(s) of the pelvis were obtained.     Left hip: Anterior dislocation of the femoral stem, the acetabular cup  is empty.    Mild to moderate degenerative change of the right hip.      Sacrum and innominate bones are partially obscured by overlying bowel  gas/fecal content.    Pelvic phlebolith.      Impression    Impression:  1. Left hip, anterior dislocation of the femoral stem component of the  left total hip arthroplasty.  2. Mild to moderate degenerative change of the right hip.    JUTTA ELLERMANN, MD         SYSTEM ID:  [id number]   CT Femur Thigh Left w/o Contrast     Status: None    Narrative    EXAM: CT FEMUR THIGH LEFT W/O CONTRAST  LOCATION: [facility]  DATE: 4/23/2025    INDICATION: Per Ortho please scan through the knee to assess for rotation at the knee.  COMPARISON: Radiographs 04/23/2025 and priors.  TECHNIQUE: Noncontrast. Axial, sagittal and coronal thin-section reconstruction. Dose reduction techniques were used.     FINDINGS:     BONES:  Left total hip arthroplasty. Metallic components create streak/beam hardening artifact limiting evaluation of adjacent structures. Acetabular and femoral components are in normal  alignment.    No fracture.    Normal alignment of the left knee with mild tricompartmental joint space narrowing.    Ill-defined mixed-density lesion in the posteromedial aspect of the tibial plateau (series 4 image 310). Associated thinning of the posterior cortex.     SOFT TISSUES:  Soft tissue edema surrounding the left hip and extending into the gluteal musculature. There is a more expansile region of fluid density posterior to the proximal femur (series 3 image 105, series 5 image 165) which could represent a fluid collection or   hematoma.    Moderate fatty infiltration of the paraspinal musculature. No enlarged lymph nodes. No acute findings within the visualized portions of the abdomen and pelvis to the limits of technique. Intrauterine device. Pelvic phleboliths.      Impression     IMPRESSION:  1.  Left hip arthroplasty with acetabular and femoral components in normal alignment.  2.  No fracture.  3.  Posttraumatic soft tissue edema surrounding the left hip and extending into the gluteal musculature. There is a more expansile region of fluid density posterior to the proximal femur which could represent a fluid collection or hematoma.  4.  Incidental ill-defined mixed-density intraosseous lesion in the posteromedial aspect of the tibial plateau. Appearance is nonspecific on noncontrast CT and if there is high clinical concern, consider further evaluation with MRI.     XR Pelvis w Hip Port Left 1 View     Status: None    Narrative    EXAM: XR PELVIS AND HIP PORTABLE LEFT 1 VIEW  LOCATION: [facility]  DATE: 4/23/2025    INDICATION: post reduction  COMPARISON: 4/23/2025      Impression    IMPRESSION: Interval reduction of previous left hip arthroplasty dislocation with satisfactory alignment. No acute fracture. Mild degenerative arthritis right hip joint.   [place] Draw     Status: None (In process)    Narrative    The following orders were created for panel order  [place] Draw.  Procedure                               Abnormality         Status                     ---------                               -----------         ------                     Extra Blue Top Tube[[id number]]                             In process                 Extra Red Top Tube[[id number]]                              In process                 Extra Green Top (Lithiu...[[id number]]                      In process                 Extra Purple Top Tube[[id number]]                           In process                   Please view results for these tests on the individual orders.   Basic metabolic panel     Status: Abnormal   Result Value Ref Range    Sodium 136 135 - 145 mmol/L    Potassium 4.2 3.4 - 5.3 mmol/L    Chloride 101 98 - 107 mmol/L    Carbon Dioxide (CO2) 19 (L) 22 - 29 mmol/L    Anion Gap 16 (H) 7 - 15 mmol/L    Urea Nitrogen 11.0 6.0 - 20.0 mg/dL    Creatinine 0.75 0.51 - 0.95 mg/dL    GFR Estimate >90 >60 mL/min/1.73m2    Calcium 10.3 8.8 - 10.4 mg/dL    Glucose 123 (H) 70 - 99 mg/dL   CBC with platelets and differential     Status: Abnormal   Result Value Ref Range    WBC Count 15.1 (H) 4.0 - 11.0 10e3/uL    RBC Count 3.74 (L) 3.80 - 5.20 10e6/uL    Hemoglobin 11.6 (L) 11.7 - 15.7 g/dL    Hematocrit 35.4 35.0 - 47.0 %    MCV 95 78 - 100 fL    MCH 31.0 26.5 - 33.0 pg    MCHC 32.8 31.5 - 36.5 g/dL    RDW 12.9 10.0 - 15.0 %    Platelet Count 692 (H) 150 - 450 10e3/uL    % Neutrophils 82 %    % Lymphocytes 12 %    % Monocytes 5 %    % Eosinophils 1 %    % Basophils 0 %    % Immature Granulocytes 1 %    NRBCs per 100 WBC 0 <1 /100    Absolute Neutrophils 12.3 (H) 1.6 - 8.3 10e3/uL    Absolute Lymphocytes 1.8 0.8 - 5.3 10e3/uL    Absolute Monocytes 0.7 0.0 - 1.3 10e3/uL    Absolute Eosinophils 0.1 0.0 - 0.7 10e3/uL    Absolute Basophils 0.1 0.0 - 0.2 10e3/uL    Absolute Immature Granulocytes 0.1 <=0.4 10e3/uL    Absolute NRBCs 0.0 10e3/uL   HCG qualitative pregnancy (blood)     Status: Normal    Result Value Ref Range    hCG Serum Qualitative Negative Negative   EKG 12-lead, tracing only     Status: None   Result Value Ref Range    Systolic Blood Pressure  mmHg    Diastolic Blood Pressure  mmHg    Ventricular Rate 83 BPM    Atrial Rate 83 BPM    WA Interval 134 ms    QRS Duration 74 ms     ms    QTc 470 ms    P Axis 62 degrees    R AXIS 12 degrees    T Axis 2 degrees    Interpretation ECG       Undetermined rhythm  Otherwise normal ECG  Unconfirmed report - interpretation of this ECG is computer generated - see medical record for final interpretation  Confirmed by - EMERGENCY ROOM, PHYSICIAN ([id number]),  EDITH PEREZ ([id number]) on 4/23/2025 2:55:10 PM     CBC with platelets differential     Status: Abnormal    Narrative    The following orders were created for panel order CBC with platelets differential.  Procedure                               Abnormality         Status                     ---------                               -----------         ------                     CBC with platelets and ...[[id number]]  Abnormal            Final result                 Please view results for these tests on the individual orders.     Medications   HYDROmorphone (PF) (DILAUDID) injection 0.5 mg (0.5 mg Intravenous $Given 4/23/25 1344)   HYDROmorphone (PF) (DILAUDID) injection 0.5 mg (0.5 mg Intravenous $Given 4/23/25 1412)   HYDROmorphone (PF) (DILAUDID) injection 0.5 mg (0.5 mg Intravenous $Given 4/23/25 1428)   HYDROmorphone (PF) (DILAUDID) injection 0.5 mg (0.5 mg Intravenous $Given 4/23/25 1456)   propofol (DIPRIVAN) injection 10 mg/mL vial (240 mg Intravenous $Given 4/23/25 1535)     Labs Ordered and Resulted from Time of ED Arrival to Time of ED Departure   BASIC METABOLIC PANEL - Abnormal       Result Value    Sodium 136      Potassium 4.2      Chloride 101      Carbon Dioxide (CO2) 19 (*)     Anion Gap 16 (*)     Urea Nitrogen 11.0      Creatinine 0.75      GFR Estimate >90      Calcium 10.3       Glucose 123 (*)    CBC WITH PLATELETS AND DIFFERENTIAL - Abnormal    WBC Count 15.1 (*)     RBC Count 3.74 (*)     Hemoglobin 11.6 (*)     Hematocrit 35.4      MCV 95      MCH 31.0      MCHC 32.8      RDW 12.9      Platelet Count 692 (*)     % Neutrophils 82      % Lymphocytes 12      % Monocytes 5      % Eosinophils 1      % Basophils 0      % Immature Granulocytes 1      NRBCs per 100 WBC 0      Absolute Neutrophils 12.3 (*)     Absolute Lymphocytes 1.8      Absolute Monocytes 0.7      Absolute Eosinophils 0.1      Absolute Basophils 0.1      Absolute Immature Granulocytes 0.1      Absolute NRBCs 0.0     HCG QUALITATIVE PREGNANCY - Normal    hCG Serum Qualitative Negative       CT Femur Thigh Left w/o Contrast   Final Result    IMPRESSION:   1.  Left hip arthroplasty with acetabular and femoral components in normal alignment.   2.  No fracture.   3.  Posttraumatic soft tissue edema surrounding the left hip and extending into the gluteal musculature. There is a more expansile region of fluid density posterior to the proximal femur which could represent a fluid collection or hematoma.   4.  Incidental ill-defined mixed-density intraosseous lesion in the posteromedial aspect of the tibial plateau. Appearance is nonspecific on noncontrast CT and if there is high clinical concern, consider further evaluation with MRI.         XR Pelvis w Hip Port Left 1 View   Final Result   IMPRESSION: Interval reduction of previous left hip arthroplasty dislocation with satisfactory alignment. No acute fracture. Mild degenerative arthritis right hip joint.      XR Pelvis w Hip Port Left G/E 2 Views   Final Result   Impression:   1. Left hip, anterior dislocation of the femoral stem component of the   left total hip arthroplasty.   2. Mild to moderate degenerative change of the right hip.      JUTTA ELLERMANN, MD            SYSTEM ID:  [id number]             Critical care was not performed.     Medical Decision Making  The patient's  presentation was of high complexity (an acute health issue posing potential threat to life or bodily function).    The patient's evaluation involved:  review of external note(s) from 1 sources (see separate area of note for details)  review of 1 test result(s) ordered prior to this encounter (see separate area of note for details)  ordering and/or review of 2 test(s) in this encounter (see separate area of note for details)  discussion of management or test interpretation with another health professional (orthopedic)    The patient's management necessitated high risk (a decision regarding emergency major procedure (dislocation reduction)) and high risk (a decision regarding hospitalization).    Assessment & Plan    Patient presents to the emergency department with concern for hip dislocation no obvious trauma.  She reports she was try to get in the car when this occurred.  She is very uncomfortable received fentanyl and route.  Got multiple doses of IV Dilaudid from me.  X-ray was obtained that confirmed dislocation.  Orthopedics was consulted.  Consent was obtained, proceeded with procedural sedation.  Patient was successfully relocated.  Patient was very hesitant to get out of bed and she was using bedpan's concern for recurrent dislocation.  Given concern for her ability to do ADLs discussed with orthopedics will be admitted to their service for PT evaluation.    Orthopedics did consult requesting a CT femur.  This was obtained per their request no changes of ER management based on CT results.    I have reviewed the nursing notes. I have reviewed the findings, diagnosis, plan and need for follow up with the patient.    New Prescriptions    No medications on file       Final diagnoses:   Recurrent dislocation of left hip   I, Ted Guillory, am serving as a trained medical scribe to document services personally performed by Joce Horton MD, based on the provider's statements to me.     I, Joce Horton MD, was  physically present and have reviewed and verified the accuracy of this note documented by Ted Block MD  [facility] EMERGENCY DEPARTMENT  4/23/2025        Joce Block MD  04/23/25 [id number]

## 2025-04-24 ENCOUNTER — VIRTUAL VISIT (OUTPATIENT)
Dept: ORTHOPEDICS | Facility: CLINIC | Age: 40
End: 2025-04-24
Payer: COMMERCIAL

## 2025-04-24 ENCOUNTER — APPOINTMENT (OUTPATIENT)
Dept: PHYSICAL THERAPY | Facility: CLINIC | Age: 40
End: 2025-04-24
Payer: COMMERCIAL

## 2025-04-24 ENCOUNTER — APPOINTMENT (OUTPATIENT)
Dept: OCCUPATIONAL THERAPY | Facility: CLINIC | Age: 40
End: 2025-04-24
Payer: COMMERCIAL

## 2025-04-24 VITALS
RESPIRATION RATE: 16 BRPM | BODY MASS INDEX: 26.76 KG/M2 | DIASTOLIC BLOOD PRESSURE: 97 MMHG | WEIGHT: 170.5 LBS | HEIGHT: 67 IN | HEART RATE: 92 BPM | SYSTOLIC BLOOD PRESSURE: 115 MMHG | TEMPERATURE: 99.1 F | OXYGEN SATURATION: 98 %

## 2025-04-24 DIAGNOSIS — Z96.642 STATUS POST TOTAL REPLACEMENT OF LEFT HIP: Primary | ICD-10-CM

## 2025-04-24 DIAGNOSIS — M25.352 HIP INSTABILITY, LEFT: Primary | ICD-10-CM

## 2025-04-24 PROBLEM — T84.021A: Status: ACTIVE | Noted: 2025-04-24

## 2025-04-24 PROCEDURE — G0378 HOSPITAL OBSERVATION PER HR: HCPCS

## 2025-04-24 PROCEDURE — 97535 SELF CARE MNGMENT TRAINING: CPT | Mod: GO

## 2025-04-24 PROCEDURE — 250N000013 HC RX MED GY IP 250 OP 250 PS 637

## 2025-04-24 PROCEDURE — 97161 PT EVAL LOW COMPLEX 20 MIN: CPT | Mod: GP

## 2025-04-24 PROCEDURE — 97166 OT EVAL MOD COMPLEX 45 MIN: CPT | Mod: GO

## 2025-04-24 PROCEDURE — 97530 THERAPEUTIC ACTIVITIES: CPT | Mod: GP

## 2025-04-24 PROCEDURE — 97116 GAIT TRAINING THERAPY: CPT | Mod: GP

## 2025-04-24 RX ORDER — ASPIRIN 81 MG/1
81 TABLET, CHEWABLE ORAL 2 TIMES DAILY
Status: DISCONTINUED | OUTPATIENT
Start: 2025-04-24 | End: 2025-04-24 | Stop reason: HOSPADM

## 2025-04-24 RX ADMIN — SENNOSIDES AND DOCUSATE SODIUM 1 TABLET: 50; 8.6 TABLET ORAL at 10:31

## 2025-04-24 RX ADMIN — ASPIRIN 81 MG CHEWABLE TABLET 81 MG: 81 TABLET CHEWABLE at 10:31

## 2025-04-24 RX ADMIN — HYDROMORPHONE HYDROCHLORIDE 2 MG: 2 TABLET ORAL at 00:05

## 2025-04-24 RX ADMIN — ACETAMINOPHEN 975 MG: 325 TABLET, FILM COATED ORAL at 10:31

## 2025-04-24 RX ADMIN — ACETAMINOPHEN 975 MG: 325 TABLET, FILM COATED ORAL at 14:14

## 2025-04-24 RX ADMIN — METHOCARBAMOL 750 MG: 750 TABLET ORAL at 14:14

## 2025-04-24 RX ADMIN — METHOCARBAMOL 750 MG: 750 TABLET ORAL at 10:31

## 2025-04-24 ASSESSMENT — ACTIVITIES OF DAILY LIVING (ADL)
ADLS_ACUITY_SCORE: 38
ADLS_ACUITY_SCORE: 43
ADLS_ACUITY_SCORE: 38
ADLS_ACUITY_SCORE: 43
ADLS_ACUITY_SCORE: 38
DEPENDENT_IADLS:: INDEPENDENT
ADLS_ACUITY_SCORE: 33

## 2025-04-24 NOTE — PROGRESS NOTES
Occupational Therapy Discharge Summary    Reason for therapy discharge:    All goals and outcomes met, no further needs identified.    Progress towards therapy goal(s). See goals on Care Plan in [facility] electronic health record for goal details.  Goals met    Therapy recommendation(s):    Pt would benefit from HHOT for home safety eval.

## 2025-04-24 NOTE — LETTER
4/24/2025      Quiana Shepherd  [address] [place] [id number]      Dear Colleague,    Thank you for referring your patient, Quiana Shepherd, to the [facility] [place]. Please see a copy of my visit note below.    I spent 45 minutes on the telephone visit today.    I spoke with Quiana regarding the outcome of her CT scan.  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  We discussed treatment options including observation and revision surgery.  In terms of surgical options that were discussed we discussed alternate bearings including constrained liners and dual mobility liners and discussed the pluses and minuses of these.  We discussed adding length to the limb to increase tissue tension.  We discussed that this would also increase offset.  We discussed small changes in the version of the femoral stem, the acetabulum, or both.  We discussed the option of using implants such as face changing liners.  We discussed the risk and benefits of this including a decreased risk of additional dislocation and risks of surgery such as blood clots, blood clots to the lungs, and increased risk of infection due to the recent nature of her her primary procedure, and continued dislocation.  We discussed putting in a case request for this so that it would be an option for her.  Discussed that I support her decision for operative or nonoperative management in any way that I could.  All the patient's questions were answered to the best my ability and she has my phone number and she is welcome to call me on my cell phone.      Again, thank you for allowing me to participate in the care of your patient.        Sincerely,        Jr Mohamud MD    Electronically signed

## 2025-04-24 NOTE — PROGRESS NOTES
04/24/25 0800   Appointment Info   Signing Clinician's Name / Credentials (PT) ANGIE Truong   Student Supervision On-site supervision provided;Direct supervision provided;Line of sight supervision provided;Direct Patient Contact Provided;Therapy services provided with the co-signing licensed therapist guiding and directing the services, and providing the skilled judgement and assessment throughout the session   Rehab Comments (PT) posterior hip precautions, hip abduction brace, avoid hyperextension   Living Environment   People in Home significant other   Current Living Arrangements house   Home Accessibility stairs to enter home;stairs within home   Number of Stairs, Main Entrance 3   Stair Railings, Main Entrance railing on left side (ascending)   Number of Stairs, Within Home, Primary greater than 10 stairs   Stair Railings, Within Home, Primary railing on right side (ascending)   Transportation Anticipated family or friend will provide   Living Environment Comments getting in/out of car has been cause of dislocations.   Self-Care   Usual Activity Tolerance fair   Current Activity Tolerance poor   Equipment Currently Used at Home walker, rolling;cane, straight   Fall history within last six months no   Activity/Exercise/Self-Care Comment assist w/ dressing/bathing   General Information   Onset of Illness/Injury or Date of Surgery 04/23/25   Referring Physician Luis Miguel Rincon MD   Patient/Family Therapy Goals Statement (PT) avoid dislocation, bathing   Pertinent History of Current Problem (include personal factors and/or comorbidities that impact the POC) Quiana Shepherd is a 39 year old female status-post left total hip arthroplasty on 4/9/25 with the following: Left hip anterior dislocation, second occurrence since surgery, successfully reduced in ED under sedation.   Existing Precautions/Restrictions fall;weight bearing;no hip IR;no hip ADD past midline;90 degree hip flexion;brace worn when out of bed    Weight-Bearing Status - LUE full weight-bearing   Weight-Bearing Status - RUE full weight-bearing   Weight-Bearing Status - LLE weight-bearing as tolerated   Weight-Bearing Status - RLE full weight-bearing   General Observations pt supine in hip abduction brace   Cognition   Affect/Mental Status (Cognition) WNL   Pain Assessment   Patient Currently in Pain Yes, see Vital Sign flowsheet  (1-2/10 at rest)   Integumentary/Edema   Integumentary/Edema Comments post op incision from initial RADHA   Posture    Posture Forward head position;Protracted shoulders   Range of Motion (ROM)   Range of Motion ROM deficits secondary to surgical procedure   Strength (Manual Muscle Testing)   Strength (Manual Muscle Testing) Deficits observed during functional mobility   Strength Comments secondary to surgical procedure   Bed Mobility   Comment, (Bed Mobility) supine to sit w/ minAx1 at LLE   Transfers   Comment, (Transfers) CGA sit to/from stand   Gait/Stairs (Locomotion)   Comment, (Gait/Stairs) CGA w/ FWW   Balance   Balance Comments IND w/ seated EOB, FWW in stance   Sensory Examination   Sensory Perception Comments no sensory changes since most recent dislocation setting patrice has had ongoing numbness on shin/part of knee on LLE since initial RADHA   Coordination   Coordination Comments not formally assessed   Muscle Tone   Muscle Tone Comments not formally assessed   Clinical Impression   Criteria for Skilled Therapeutic Intervention Yes, treatment indicated   PT Diagnosis (PT) impaired functional mobility   Influenced by the following impairments pain, weakness, L RADHA   Functional limitations due to impairments bed mobility, transfers, amb, stairs   Clinical Presentation (PT Evaluation Complexity) stable   Clinical Presentation Rationale per clincal judgement   Clinical Decision Making (Complexity) low complexity   Planned Therapy Interventions (PT) bed mobility training;gait training;home exercise program;patient/family  education;ROM (range of motion);strengthening;stair training;transfer training;risk factor education;progressive activity/exercise;home program guidelines   Risk & Benefits of therapy have been explained evaluation/treatment results reviewed;care plan/treatment goals reviewed;risks/benefits reviewed;current/potential barriers reviewed;participants voiced agreement with care plan;participants included;patient   Clinical Impression Comments pt requiring minAx1-CGA for functional mobility. pt has now dislocated twice since initial RADHA (4/9) and would benefit from ongoing therapies and medical support.   PT Total Evaluation Time   PT Priscilla, Low Complexity Minutes (49569) 10   Physical Therapy Goals   PT Frequency Daily   PT Predicted Duration/Target Date for Goal Attainment 05/15/25   PT Goals Bed Mobility;Transfers;Gait;Stairs   PT: Bed Mobility Modified independent;Within precautions;Supine to/from sit   PT: Transfers Modified independent;Sit to/from stand;Assistive device;Within precautions   PT: Gait Modified independent;Rolling walker;Within precautions;Greater than 200 feet   PT: Stairs Modified independent;Assistive device;Within precautions;3 stairs;Rail on left   Interventions   Interventions Quick Adds Gait Training;Therapeutic Activity   Therapeutic Activity   Therapeutic Activities: dynamic activities to improve functional performance Minutes (06034) 30   Symptoms Noted During/After Treatment Fatigue   Treatment Detail/Skilled Intervention pt supine in bed w/ hip abduction brace and agreeable to PT upon arrival. time taken for room set up/line management. pt completed supine to sit transfer w/ minAx1 at LLE. inc time required for transfer at pt's request and seated rest break EOB required upon completion of transfer. pt comlpeted sit to stand transfer w/ CGA and FWW w/ increased time for VC and education on precautions d/t hesitancy. upon completion of gait training, pt completed stand to sit transfer w/ FWW  and CGA. at this time, extensive pt edu given regarding transfer home as pt inquired about safe car transfers (most recent dislocation occuring w/ car transfer). We discussed options for each vehicle they have including passenger seat and back seat and ultimately determined van to likely be safest option. PT thoroughly explained safe transfer to her van and gave pt handout w/ detailed instructions. pt verbalized understanding. PT then assisted w/ additional sit to/from stand at request of dietician to record pt's weight. SBA only required as pt used UE support on scale and PT again discussed VC for safe transfer. pt seated EOB upon pt exit and requesting use of restroom. nursing notified to assist w/ restroom use and bring toilet riser.   Gait Training   Gait Training Minutes (24451) 25   Symptoms Noted During/After Treatment (Gait Training) fatigue;increased pain   Treatment Detail/Skilled Intervention pt completed standing marches w/ FWW and CGA. pt completed 1x10 B. pt amb w/ FWW and  ft. pt demonstrating decreased gait speed throughout. pt edu for leading w/ LLE and staying w/in walker. pt demonstrated understanding. pt edu given during this time regarding stair navigation as well, pt verbalizing understanding of proper sequence and assist from partner. PT offering practice w/ stairs however pt declined as she felt confident w/ understanding of stairs.   Distance in Feet 150   PT Discharge Planning   PT Plan transfers, amb, stairs, step up w/ step stool   PT Discharge Recommendation (DC Rec) home;home with assist;home with home care physical therapy   PT Rationale for DC Rec pt requiring minAx1-CGA for functional mobility. pt has now dislocated twice since initial RADHA (4/9) and would benefit from ongoing therapies and medical support. Highest risk of dislocation has been w/ car transfers.   PT Brief overview of current status minAx1 w/ bed mob, CGA for transfers and amb w/ FWW   PT Total Distance Amb  During Session (feet) 150   Physical Therapy Time and Intention   Timed Code Treatment Minutes 55   Total Session Time (sum of timed and untimed services) 65   Psychosocial Support   Trust Relationship/Rapport care explained;questions answered;questions encouraged

## 2025-04-24 NOTE — PROGRESS NOTES
I spent 45 minutes on the telephone visit today.    I spoke with Quiana regarding the outcome of her CT scan.  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  We discussed treatment options including observation and revision surgery.  In terms of surgical options that were discussed we discussed alternate bearings including constrained liners and dual mobility liners and discussed the pluses and minuses of these.  We discussed adding length to the limb to increase tissue tension.  We discussed that this would also increase offset.  We discussed small changes in the version of the femoral stem, the acetabulum, or both.  We discussed the option of using implants such as face changing liners.  We discussed the risk and benefits of this including a decreased risk of additional dislocation and risks of surgery such as blood clots, blood clots to the lungs, and increased risk of infection due to the recent nature of her her primary procedure, and continued dislocation.  We discussed putting in a case request for this so that it would be an option for her.  Discussed that I support her decision for operative or nonoperative management in any way that I could.  All the patient's questions were answered to the best my ability and she has my phone number and she is welcome to call me on my cell phone.

## 2025-04-24 NOTE — PROGRESS NOTES
Orthopedic Surgery Progress Note 04/24/2025    S:  No acute events overnight. Pain controlled on current regimen with the patient only requiring dilaudid once overnight. However she has not moved since her dislocation yesterday and is unsure how her pain will change with movement. Expressed apprehension this AM about any movement of the hip due to prior dislocations. Denies pain elsewhere, new numbness, tingling, shortness of breath, chest pain, nausea/vomiting, fevers or chills.     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 Mohamud on 4/9/2025. Surgical dressing was changed this AM and new steri trips were applied with primapore dressing in place. Patient may cover the primapore dressing to shower. Primapore dressing may stay on for 1 more week and can be changed PRN.     Post Surgical history:  4/14/2025- LEFT Anterior Hip Dislocation- Closed Reduced ED  4/24/2025- LEFT Anterior Hip Dislocation- Closed Reduced ED (Required >120 propofol)    Plan:  Orthopedic Primary  Activity: Avoid any hyperextension of the hip. Continue posterior hip precautions as well. Hip will be more stable in a flexed position.   Weight bearing status: WBAT.  Diet: Progress diet as tolerated.  DVT prophylaxis: SCDs and mechanical while in the hospital. ASA 81 mg BID  Bracing/Splinting: Hip Abduction brace at all  times.  Pain management: Utilize all oral meds first, IV meds for severe breakthrough pain after PO meds given adequate time to take effect.  X-rays: Completed  Therapy: PT/OT evaluate prior to discharge. Will need in-home PT  Consults: PT, OT  Disposition: Okay for discharge to home from Orthopedics standpoint   [facility]: [facility]    Orthopedic surgery staff for this patient is Dr. Mohamud. Discussed.    --  Confidence O Margaux Shi MD  Orthopedic Surgery Resident    Please page me directly via [facility] with any questions/concerns during regular weekday hours before 5pm. If there is no response, if it is a weekend, or if it is during evening hours, then please page the orthopedic surgery resident on call.

## 2025-04-24 NOTE — CONSULTS
Care Management Initial Consult    General Information  Assessment completed with: Patient,    Type of CM/SW Visit: Initial Assessment    Primary Care Provider verified and updated as needed: No   Readmission within the last 30 days: no previous admission in last 30 days      Reason for Consult: discharge planning  Advance Care Planning: Advance Care Planning Reviewed: present on chart, no concerns identified          Communication Assessment  Patient's communication style: spoken language (English or Bilingual)    Hearing Difficulty or Deaf: no   Wear Glasses or Blind: no    Cognitive  Cognitive/Neuro/Behavioral: WDL                      Living Environment:   People in home: significant other     Current living Arrangements: house      Able to return to prior arrangements: yes       Family/Social Support:  Care provided by: self, spouse/significant other  Provides care for: no one  Marital Status: Domestic Partnership  Support system: Significant Other          Description of Support System: Supportive, Involved    Support Assessment: Adequate family and caregiver support    Current Resources:   Patient receiving home care services: No        Community Resources: None  Equipment currently used at home: tub bench, walker, rolling, cane, straight, raised toilet seat (FWW, sock aid, reacher, leg )  Supplies currently used at home: None    Employment/Financial:  Employment Status: employed full-time        Financial Concerns: none           Does the patient's insurance plan have a 3 day qualifying hospital stay waiver?  No    Lifestyle & Psychosocial Needs:  Social Drivers of Health     Food Insecurity: Low Risk  (4/24/2025)    Food Insecurity     Within the past 12 months, did you worry that your food would run out before you got money to buy more?: No     Within the past 12 months, did the food you bought just not last and you didn t have money to get more?: No   Depression: Not at risk (3/17/2025)    PHQ-2      PHQ-2 Score: 0   Housing Stability: Low Risk  (4/24/2025)    Housing Stability     Do you have housing? : Yes     Are you worried about losing your housing?: No   Tobacco Use: Low Risk  (4/9/2025)    Patient History     Smoking Tobacco Use: Never     Smokeless Tobacco Use: Never     Passive Exposure: Not on file   Financial Resource Strain: Low Risk  (4/24/2025)    Financial Resource Strain     Within the past 12 months, have you or your family members you live with been unable to get utilities (heat, electricity) when it was really needed?: No   Alcohol Use: Not on file   Transportation Needs: Low Risk  (4/24/2025)    Transportation Needs     Within the past 12 months, has lack of transportation kept you from medical appointments, getting your medicines, non-medical meetings or appointments, work, or from getting things that you need?: No   Physical Activity: Not on file   Interpersonal Safety: Low Risk  (4/24/2025)    Interpersonal Safety     Do you feel physically and emotionally safe where you currently live?: Yes     Within the past 12 months, have you been hit, slapped, kicked or otherwise physically hurt by someone?: No     Within the past 12 months, have you been humiliated or emotionally abused in other ways by your partner or ex-partner?: No   Stress: Not on file   Social Connections: Not on file   Health Literacy: Not on file       Functional Status:  Prior to admission patient needed assistance:   Dependent ADLs:: Independent  Dependent IADLs:: Independent  Assesssment of Functional Status: Not at baseline with mobility    Mental Health Status:  Mental Health Status: Current Concern  Mental Health Management: Other (see comment) (interested in starting an antidepressant)    Chemical Dependency Status:  Chemical Dependency Status: No Current Concerns             Values/Beliefs:  Spiritual, Cultural Beliefs, [employer]ian Practices, Values that affect care: no               Discussed  Partnership in Safe  "Discharge Planning  document with patient/family: No    Additional Information:  Per H&P \" Quiana Shepherd is a 39 year old female with history of previous acetabular impingement with CAM lesion and labral tear  who under LEFT RADHA by Dr. Mohamud on 4/9/2025.\" Came into hospital by ambulance following left hip anterior dislocation, second occurrence since surgery, successfully reduced in ED under sedation.     Writer met with pt at bedside to introduce role and assess for discharge needs r/t home care needs. Pt reported she lives in a home with her significant other, Isacc, and is independent with I/ADLs at baseline. Home DME listed above. Address and PCP verified.     Pt denied financial or CD concerns. Pt endorsed significant MH concerns stating her MH is \"horrible\". Pt is interested in initiating an antidepressant. Writer explained she will need to establish with a  PCP for this medication. Pt declined referral to establish and prefers to find her own PCP. HCD present on chart.     Pt requesting home PT/OT as traveling to clinic is difficult d/t her hip pain. Pt's OP orthopedist contacted to follow for home care orders. Dr. Mohamud will follow. [facility] is accepting agency. HC order placed.     Pt reported Isacc will transport her home. No further discharge needs identified. Care Management will sign off. Please re-consult should new needs arise.     Next Steps: [facility]    Services Available   Home Health Services      Address   [address] [address] [place] [id number]             Contact Information    [phone]          Chiquita Cummings RN   Nurse Coordinator    6 Med/Surg  Phone: [phone]    Social Work and Care Management Department     SEARCHABLE in [facility] - search CARE COORDINATOR   [facility] ([phone]) Saturday & Sunday; ([phone]) [facility] Recognized Holidays   Units: 6 Med Surg Vocera & 8 Med Surg Vocera Pager [phone]      "

## 2025-04-24 NOTE — PROGRESS NOTES
04/24/25 1135   Appointment Info   Signing Clinician's Name / Credentials (OT) Arelis Allison, OTR/L   Rehab Comments (OT) posterior hip prec, no hyperextension, hip abd brace at all times   Living Environment   People in Home alone;significant other   Current Living Arrangements house   Home Accessibility stairs to enter home;stairs within home   Number of Stairs, Main Entrance 3   Stair Railings, Main Entrance railing on left side (ascending)   Number of Stairs, Within Home, Primary greater than 10 stairs   Stair Railings, Within Home, Primary railing on right side (ascending)   Living Environment Comments Pt lives alone, but her SO has beens staying with her. SO works remotely and is able assist pt as needed when not in a meeting.   Self-Care   Usual Activity Tolerance fair   Current Activity Tolerance fair   Equipment Currently Used at Home tub bench;walker, rolling;cane, straight;raised toilet seat  (FWW, sock aid, reacher, leg )   Fall history within last six months no   Activity/Exercise/Self-Care Comment SO has been assisting with all I/ADLs post surgery and plans to continue for near furture   General Information   Onset of Illness/Injury or Date of Surgery 04/23/25   Referring Physician Dr. Mohamud   Patient/Family Therapy Goal Statement (OT) not to dislocate and be able to clean self with confidence   Additional Occupational Profile Info/Pertinent History of Current Problem per chart, 39 year old female with history of previous acetabular impingement with CAM lesion and labral tear  who under LEFT RADHA by Dr. Mohamud on 4/9/2025.   Existing Precautions/Restrictions no hip IR;no hip ADD past midline;no hip hyperextension;90 degree hip flexion;weight bearing  (hip abd brace at all times)   Left Upper Extremity (Weight-bearing Status) full weight-bearing (FWB)   Right Upper Extremity (Weight-bearing Status) full weight-bearing (FWB)   Left Lower Extremity (Weight-bearing Status) weight-bearing as  tolerated (WBAT)   Right Lower Extremity (Weight-bearing Status) full weight-bearing (FWB)   Cognitive Status Examination   Orientation Status orientation to person, place and time   Affect/Mental Status (Cognitive) WFL   Follows Commands WFL   Visual Perception   Visual Impairment/Limitations WFL   Sensory   Sensory Quick Adds left LE   Sensory Comments LLE numbness post surgery   Pain Assessment   Patient Currently in Pain Yes, see Vital Sign flowsheet   Posture   Posture not impaired   Range of Motion Comprehensive   General Range of Motion no range of motion deficits identified   Strength Comprehensive (MMT)   General Manual Muscle Testing (MMT) Assessment no strength deficits identified   Muscle Tone Assessment   Muscle Tone Quick Adds No deficits were identified   Coordination   Upper Extremity Coordination No deficits were identified   Bed Mobility   Bed Mobility supine-sit;sit-supine   Supine-Sit Williams (Bed Mobility) moderate assist (50% patient effort)   Sit-Supine Williams (Bed Mobility) moderate assist (50% patient effort)   Transfers   Transfers bed-chair transfer;sit-stand transfer;toilet transfer   Transfer Skill: Bed to Chair/Chair to Bed   Bed-Chair Williams (Transfers) supervision   Sit-Stand Transfer   Sit-Stand Williams (Transfers) supervision   Toilet Transfer   Williams Level (Toilet Transfer) supervision   Balance   Balance Assessment no deficits identified   Activities of Daily Living   BADL Assessment/Intervention lower body dressing;toileting   Lower Body Dressing Assessment/Training   Williams Level (Lower Body Dressing) supervision   Toileting   Williams Level (Toileting) supervision   Clinical Impression   Criteria for Skilled Therapeutic Interventions Met (OT) Yes, treatment indicated   OT Diagnosis decreased ADL ind   Influenced by the following impairments L RADHA dislocation   OT Problem List-Impairments impacting ADL problems related to;activity tolerance  impaired;post-surgical precautions   Assessment of Occupational Performance 3-5 Performance Deficits   Identified Performance Deficits dressing, toileting, showering   Planned Therapy Interventions (OT) ADL retraining   Clinical Decision Making Complexity (OT) detailed assessment/moderate complexity   Risk & Benefits of therapy have been explained evaluation/treatment results reviewed;patient   OT Total Evaluation Time   OT Eval, Moderate Complexity Minutes (36514) 10   OT Goals   Therapy Frequency (OT) One time eval and treatment   OT Predicted Duration/Target Date for Goal Attainment 04/24/25   OT Goals Toilet Transfer/Toileting   OT: Toilet Transfer/Toileting Supervision/stand-by assist;within precautions;using adaptive equipment;Goal Met;Completed   Interventions   Interventions Quick Adds Self-Care/Home Management   Self-Care/Home Management   Self-Care/Home Mgmt/ADL, Compensatory, Meal Prep Minutes (22367) 30   Symptoms Noted During/After Treatment (Meal Preparation/Planning Training) none   Treatment Detail/Skilled Intervention Pt supine in bed upon OT arrival and agreeable to therapy. Educ on L hip precautions and hip abd brace. Educ on LB dressing with AE and wearing loose LB clothing that can go over the brace for ease and comfort. Pt declined trial with AE and reports that she knows how to use AE and plans to have SO continue to assist. Educ on safe showering and transfer using tub bench. Educ on safe car transfer. Educ on safe toileting and AE as needed. Extensive time spent on educ as pt reports concerns for dislocation. All questions answered within scope of OT. Pt Min A supine <>sit bed mob with A to suport LLE. CGA STS, bed and toilet transfers with commode overlay. SBA toileting. Pt left supine in bed with needs in reach.   OT Discharge Planning   OT Plan DC OT   OT Discharge Recommendation (DC Rec) home with assist;home with home care occupational therapy   OT Rationale for DC Rec Pt plans for SO  to continue to stay with her and assist as needed with all ADLs. Pt would benefit from HHOT for home safety eval.   OT Brief overview of current status SBA-Min A   OT Total Distance Amb During Session (feet) 30   Total Session Time   Timed Code Treatment Minutes 30   Total Session Time (sum of timed and untimed services) 40

## 2025-04-24 NOTE — NURSING NOTE
Reason For Visit:   Chief Complaint   Patient presents with    RECHECK     Review CT and x-rays         39 year old  1985      Ref. MD: est      LMP  (LMP Unknown)     Pain Assessment  Patient Currently in Pain: Yes  0-10 Pain Scale: 2  Primary Pain Location:  (left)            Merritt Clemente ATC

## 2025-04-24 NOTE — PROGRESS NOTES
2350    6MS ADMISSION    D: Patient admitted from -ED via stretcher for L hip anterior dislocation.     I: Upon arrival to the unit patient was oriented to room, unit, and call light. Patient s height, weight, and vital signs were obtained. Allergies reviewed and allergy band applied. Provider notified of patient s arrival on the unit. Adult AVS completed. Head to toe assessment completed. Education assessment completed. Care plan initiated.    A: Vital signs stable upon admission. Patient rates pain at 2/10. Two RN skin assessment completed, Second RN was Ivonne MCGOVERN No skin issues.     P: Continue to monitor patient and intervene as needed. Continue with plan of care. Notify provider with any concerns or changes in patient status.

## 2025-04-24 NOTE — PROGRESS NOTES
"CLINICAL NUTRITION SERVICES - ASSESSMENT NOTE    RECOMMENDATIONS FOR MDs/PROVIDERS TO ORDER:  None at this time    Malnutrition Status:    Patient does not meet two of the established criteria necessary for diagnosing malnutrition but is at risk for malnutrition    Registered Dietitian Interventions:  Ensure Max Daily Chocolate    Future/Additional Recommendations:  Monitor labs, stooling, weight trends, intakes, supplement acceptance     REASON FOR ASSESSMENT  Positive Admission Nutrition Risk Screen    PMH of osteoarthritis of left hip joint due to dysplasia s/p total left hip arthroplasty (4/9/25) who presents to the ED BIBA with left hip pain     Respiratory Room air    SUBJECTIVE INFORMATION  Assessed patient in room.    NUTRITION HISTORY  Pt reports since last surgery 4/09/25, decreased intakes and appetite. Pt's partner will make a protein shake (full ) for  her every other day. Pt will have 2-3 meals daily. Pt thinks she is consuming about 1/2-1 meal less than her normal. Pt is lactose intolerant but does tolerate greek yogurt.    CURRENT NUTRITION ORDERS  Diet: Regular  Pt agrees to daily Ensure Enlive protein shakes    CURRENT INTAKE/TOLERANCE  Intake/Tolerance: N/A  Per [facility] meal order review: New admit, no meals ordered yet    LABS  Nutrition-relevant labs: Reviewed    MEDICATIONS  Nutrition-relevant medications: Reviewed    ANTHROPOMETRICS  Height: 170.2 cm (5' 7.008\")  Most Recent Weight:   Wt Readings from Last 1 Encounters:   04/24/25 77.3 kg (170 lb 8 oz)     IBW: 61.4 kg  % IBW: 125%  Body mass index is 26.7 kg/m .  BMI (kg/m ): Overweight BMI 25-29.9  Weight History: No significant weight loss noted. No additional recent weight history available in EMR/Care Everywhere.   Wt Readings from Last 15 Encounters:   04/09/25 76.9 kg (169 lb 8.5 oz)   03/17/25 78.7 kg (173 lb 9.6 oz)   10/03/24 77.1 kg (170 lb)   01/05/24 72.6 kg (160 lb)   10/10/23 72.6 kg (160 lb)   09/22/23 72.6 kg (160 " lb)   09/21/23 72.6 kg (160 lb)   07/08/21 72.6 kg (160 lb)   07/10/18 72.6 kg (160 lb)   06/22/18 72.6 kg (160 lb)     Dosing Weight: 65.3 kg, based on Adjusted Body Weight (using 61.4 kg IBW and 76.9 kg actual weight:     ASSESSED NUTRITION NEEDS  Estimated Energy Needs: 1635 - 1960 kcals/day (25 - 30 kcals/kg)  Justification: Maintenance  Estimated Protein Needs: 98 grams protein/day (1.5 grams or pro/kg)  Justification: Post-op  Estimated Fluid Needs: 1635 - 1960 mL/day (1 mL/kcal)  Justification: Maintenance    SYSTEM FINDINGS    Skin/wounds: No significant skin impairments noted  GI symptoms: None reported  Last BM: 4/23  Bowel regimen: Scheduled    RENAL  GFR >90 and No acute concerns    MALNUTRITION  % Intake: < 75% for >/= 1 month (moderate)  % Weight Loss: None noted  Subcutaneous Fat Loss: None observed  Muscle Loss: None observed  Fluid Accumulation/Edema: None noted  Malnutrition Diagnosis: Patient does not meet two of the established criteria necessary for diagnosing malnutrition but is at risk for malnutrition  Malnutrition Present on Admission: No    NUTRITION DIAGNOSIS  Inadequate oral intake related to decreased appetite as evidenced by pt self report    INTERVENTIONS  Medical food supplement therapy    Goals  Patient to consume % of nutritionally adequate meal trays TID, or the equivalent with supplements/snacks.     Monitoring/Evaluation  Progress toward goals will be monitored and evaluated per policy.    Vashti MOORE  Clinical Dietitian  [facility] 5B/10A ICU Vocera, Teams, or desk [phone]  Weekend/Holiday Vocera: Weekend Holiday Clinical Dietitian [Multi Site Groups]

## 2025-04-24 NOTE — PLAN OF CARE
"Goal Outcome Evaluation:       Plan of Care Reviewed With: patient     Overall Patient Progress: no change     Outcome Evaluation:     VS: BP (!) 115/97 (BP Location: Right arm)   Pulse 92   Temp 99.1  F (37.3  C) (Oral)   Resp 16   Ht 1.702 m (5' 7.01\")   Wt 77.3 kg (170 lb 8 oz)   LMP  (LMP Unknown)   SpO2 98%   BMI 26.70 kg/m      O2: SpO2 98% and stable on RA. LS clear and equal bilaterally. Denies chest pain and SOB.    Output: Voids spontaneously without difficulty to bathroom    Last BM: denies abdominal discomfort. BS active / passing flatus.    Activity: Assist of 1   Skin: WDL except,    Pain: Pain managed with Tylenol   CMS: Intact, AOx4. Complains numbness and tingling.   Dressing: Clean, dry intact    Diet: Regular diet. Denies nausea/vomiting.    LDA:  R PIV SL    Equipment: IV pole, personal belongings,    Plan: standard precautions maintained / Continue with plan of care. Call light within reach, pt able to make needs known.    Additional Info: Patient completed PT/OT evaluation   Anticipated discharge today.         "

## 2025-04-24 NOTE — MEDICATION SCRIBE - ADMISSION MEDICATION HISTORY
Medication Scribe Admission Medication History    Admission medication history is complete. The information provided in this note is only as accurate as the sources available at the time of the update.    Information Source(s): Patient via in-person    Pertinent Information:     Changes made to PTA medication list:  Added: None  Deleted: tretinoin (RETIN-A) 0.025 % external cream     Use every night   Tretinoin (RETIN-A) 0.05 % external cream     Apply topically at bedtime.   eflornithine HCl 13.9 % CREA     Externally apply topically daily   eflornithine HCl 13.9 % CREA     Externally apply topically daily.   dapsone (ACZONE) 7.5 % gel     Apply topically daily.   Patient no longer taking the above medications  Changed: None    Allergies reviewed with patient and updates made in EHR: yes    Medication History Completed By: Berenice Garcia 4/23/2025 8:46 PM    PTA Med List   Medication Sig Note Last Dose/Taking    acetaminophen (TYLENOL) 325 MG tablet Take 2 tablets (650 mg) by mouth every 4 hours as needed for other (mild pain).  Unknown    aspirin 81 MG EC tablet Take 1 tablet (81 mg) by mouth 2 times daily.  4/23/2025 Morning    cholecalciferol (VITAMIN D3) 25 mcg (1000 units) capsule Take 1 capsule by mouth daily.  4/23/2025 Morning    Cyanocobalamin (B-12) 1000 MCG TBCR Take by mouth.  4/23/2025 Morning    levonorgestrel (MIRENA) 20 MCG/24HR IUD 1 each by Intrauterine route once 4/23/2025: Continuous inplant  Taking    ondansetron (ZOFRAN ODT) 4 MG ODT tab Take 1 tablet (4 mg) by mouth every 8 hours as needed for nausea.  Unknown    oxyCODONE (ROXICODONE) 5 MG tablet Take 1-2 tablets (5-10 mg) by mouth every 4 hours as needed for pain.  Unknown    oxyCODONE (ROXICODONE) 5 MG tablet Take 1-2 tablets (5-10 mg) by mouth every 4 hours as needed for moderate to severe pain.  Unknown    senna-docusate (SENOKOT-S/PERICOLACE) 8.6-50 MG tablet Take 1-2 tablets by mouth 2 times daily as needed for constipation. Take  while on oral narcotics to prevent or treat constipation.  Unknown

## 2025-04-24 NOTE — PROGRESS NOTES
6MS DISCHARGE    D: Patient discharged to Home at 1700. Patient accompanied by partner.    I: Discharge prescriptions given to patient. All discharge medications and instructions reviewed with Patient. Patient instructed to seek care if experiencing worsening symptoms. Other phone numbers to call with questions or concerns after discharge reviewed. PIV removed. Education completed.    A: Patient verbalized understanding of discharge medications and instructions. Prescribed home medications given to patient.  Belongings returned to patient.    P: Patient to follow-up with PCP.

## 2025-04-24 NOTE — PLAN OF CARE
"[id number]    Goal Outcome Evaluation:      Plan of Care Reviewed With: patient    Overall Patient Progress: no changeOverall Patient Progress: no change    Patient is A&O x4. Call light within reach. Able to make needs known. Not OOB during this shift, patient is very anxious with movement and transfers. Purewick in place. LBM: 4/24 utilized bedpan, able to tolerate it.     RA. Regular diet. R PIV, SL. Reports 2/10 pain at LLE at rest, PRN PO dilaudid given. Denies SOB, chest pain and n/v. CMS on LLE, intact, pulses palpable except less sensation and reports numbness and tingling on thigh and shin. L hip abductor brace in place, skin inspected underneath, no skin breakdown noted except incision and dressing. Dressing changed by Ortho doctor this AM.     Continue with POC.     /86 (BP Location: Right arm, Patient Position: Semi-Greenberg's, Cuff Size: Adult Regular)   Pulse 71   Temp 98.8  F (37.1  C) (Oral)   Resp 15   Ht 1.702 m (5' 7.01\")   LMP  (LMP Unknown)   SpO2 96%   BMI 26.55 kg/m      Cess YOLI Diane RN on 4/24/2025 at 7:41 AM        "

## 2025-04-24 NOTE — PROGRESS NOTES
Patient seen and evaluated in the ED. Anterior-perched femoral head after twisting/scooting to get into car. Similar images to previous event. Reports some return of femoral nerve function since last seen (was likely perched on the anterior rim for about 3 days at that time). This visit out for a few hours. Discussed risks and benefits of closed reduction -- medication risks-- and the benefit of relocating and protecting her femoral nerve.  Procedure note: with sedation provided by the ED attending the hip was relocated with axial traction, gentle external rotation and then internal rotation. Reduction confirmed with radiographs. CT of the femur to evaluate stem version ordered. Will review results with the patient tomorrow and make plan.

## 2025-04-25 NOTE — PLAN OF CARE
Physical Therapy Discharge Summary    Reason for therapy discharge:    Discharged to home with home therapy.    Progress towards therapy goal(s). See goals on Care Plan in [facility] electronic health record for goal details.  Goals partially met.  Barriers to achieving goals:   discharge from facility.    Therapy recommendation(s):    Continued therapy is recommended.  Rationale/Recommendations:  continue w/ home care PT d/t pt risk for dislocation w/ vehicle transfers.

## 2025-04-28 ENCOUNTER — TELEPHONE (OUTPATIENT)
Dept: ORTHOPEDICS | Facility: CLINIC | Age: 40
End: 2025-04-28
Payer: COMMERCIAL

## 2025-04-28 NOTE — TELEPHONE ENCOUNTER
Other: [facility] DALE MONTESINOS called and is needing help getting home care for the patient . Please call her back.     Could we send this information to you in [employer] or would you prefer to receive a phone call?:   Patient would prefer a phone call   Okay to leave a detailed message?: Yes at Other phone number:  [phone]

## 2025-04-28 NOTE — TELEPHONE ENCOUNTER
Called and talked with Dank, discussed possible reasons patient may be denied for home care from 15 agencies. Nothing documented what agencies  have been tried and unsure who is doing this checking as it is a system generated check.  Michelle will call patients insurance company to find reason for denial and agencies that will accept patient. She will contact us for any need.  Roxane Robertson RN

## 2025-04-29 ENCOUNTER — VIRTUAL VISIT (OUTPATIENT)
Dept: SURGERY | Facility: CLINIC | Age: 40
End: 2025-04-29
Payer: COMMERCIAL

## 2025-04-29 ENCOUNTER — ANESTHESIA EVENT (OUTPATIENT)
Dept: SURGERY | Facility: CLINIC | Age: 40
End: 2025-04-29
Payer: COMMERCIAL

## 2025-04-29 VITALS — HEIGHT: 67 IN | WEIGHT: 173 LBS | BODY MASS INDEX: 27.15 KG/M2

## 2025-04-29 DIAGNOSIS — Z01.818 PRE-OPERATIVE EXAMINATION: Primary | ICD-10-CM

## 2025-04-29 DIAGNOSIS — M25.352 HIP INSTABILITY, LEFT: ICD-10-CM

## 2025-04-29 PROCEDURE — 98007 SYNCH AUDIO-VIDEO EST HI 40: CPT | Performed by: PHYSICIAN ASSISTANT

## 2025-04-29 RX ORDER — SPIRONOLACTONE 50 MG/1
100 TABLET, FILM COATED ORAL EVERY MORNING
COMMUNITY

## 2025-04-29 ASSESSMENT — ENCOUNTER SYMPTOMS: SEIZURES: 0

## 2025-04-29 ASSESSMENT — LIFESTYLE VARIABLES: TOBACCO_USE: 0

## 2025-04-29 NOTE — PATIENT INSTRUCTIONS
Preparing for Your Surgery      Name:  Quiana Shepherd   MRN:  [id number]   :  1985   Today's Date:  2025     The [place] [employer] I-[address] Construction Project                                Timeline 2025 -2025    This project will affect travel to the [facility] and [facility], as well as the [facility].      Please check the [facility] [address] project website for the most up to date information and give yourself additional time to reach your destination.        Arriving for surgery:  Surgery date:  25  Arrival time:  9:15 am  Surgery time: 11:45 am    Please come to:     Please come to:       [facility] [address]   [address] [place]  [address]     The Green Ramp for patients and visitors is beneath the [facility]. The parking facility entrance is at the intersection of [address] [place] [address] and [address]. Patients and visitors who self-park will receive the reduced hospital parking rate (no ticket validation needed).     "[employer]" parking, located at the [facility] main entrance on [address] [place] [address], is available Monday - Friday from 7 am to 3:30 pm.     Discounted parking pass options can be purchased from  attendants during business hours.     -Check in at the security desk in the [facility] ([facility])   Lobby. They will direct you to the correct elevators.   -Proceed to the 3rd floor, Adult Surgery Waiting Lounge. [phone]     If you need directions, a wheelchair or escort please stop at the Information Desk in the lobby.  Inform the information person you are here for surgery; a wheelchair and escort to [address] will be provided.   An escort to the Adult Surgery Waiting Lounge will be provided. .    What can I eat or drink?  -  You may eat and drink normally up to 8 hours prior  to arrival time. (Until 1:15 am)  -  You may have clear liquids until 2 hours prior to arrival time. (Until 7:15 am)    Examples of clear liquids:  Water  Clear broth  Juices (apple, white grape, white cranberry  and cider) without pulp  Noncarbonated, powder based beverages  (lemonade and Kwan-Aid)  Sodas (Sprite, 7-Up, ginger ale and seltzer)  Coffee or tea (without milk or cream)  Gatorade    -  No Alcohol or cannabis products for at least 24 hours before surgery.     Which medicines can I take?    Hold Aspirin for 7 days before surgery. Keep taking this until you hear back from Carol, who will check with Dr. Mohamud.  Hold Multivitamins for 7 days before surgery.  Hold Supplements for 7 days before surgery.  Hold Ibuprofen (Advil, Motrin) for 1 day(s) before surgery--unless otherwise directed by surgeon.  Hold Naproxen (Aleve) for 4 days before surgery.    -  DO NOT take these medications the day of surgery:  Vitamin D  Vitamin B12  Senokot  Spironolactone (Aldactone)    -  PLEASE TAKE these medications the day of surgery:  Tylenol if needed  Oxycodone if needed    How do I prepare myself?  - Please take 2 showers (one the night prior to surgery and one the morning of surgery) using Scrubcare or Hibiclens soap.    Use this soap only from the neck to your toes. Avoid genital area      Leave the soap on your skin for one minute--then rinse thoroughly.      You may use your own shampoo and conditioner. No other hair products.   - Please remove all jewelry and body piercings.  - No lotions, deodorants or fragrance.  - No makeup or fingernail polish.   - Bring your ID and insurance card.    -For patients being admitted to the [facility]  Family members are to take the patient belongings with them and place them in the lockers provided in the Family Lounge.  Please limit the items you bring to 1 bag as the lockers are small.      -If you use a CPAP machine, please bring the CPAP machine, tubing, and mask to  hospital.    -If you have a Deep Brain Stimulator, Spinal Cord Stimulator, or any Neuro Stimulator device---you must bring the remote control to the hospital.      ALL PATIENTS GOING HOME THE SAME DAY OF SURGERY ARE REQUIRED TO HAVE A RESPONSIBLE ADULT TO DRIVE AND BE IN ATTENDANCE WITH THEM FOR 24 HOURS FOLLOWING SURGERY.    Covid testing policy as of 12/06/2022  Your surgeon will notify and schedule you for a COVID test if one is needed before surgery--please direct any questions or COVID symptoms to your surgeon      Questions or Concerns:    - For any questions regarding the day of surgery or your hospital stay, please contact the Pre Admission Nursing Office at [phone].       - If you have health changes between today and your surgery, please call your surgeon.       - For questions after surgery, please call your surgeons office.           Current Visitor Guidelines    2 adult visitors for adult patients in the pre op area    If additional visitors come (beyond a patient care attendant or a group home caregiver), the additional visitors will be asked to wait in the main lobby of the hospital    Visiting hours: 8 a.m. to 8:30 p.m.    Patients confirmed or suspected to have symptoms of COVID 19 or flu:     No visitors allowed for adult patients.   Children (under age 18) can have 1 named visitor.     People who are sick or showing symptoms of COVID 19 or flu:    Are not allowed to visit patients--we can only make exceptions in special situations.       Please follow these guidelines for your visit:          Please maintain social distance          Masking is optional--however at times you may be asked to wear a mask for the safety of yourself and others     Clean your hands with alcohol hand . Do this when you arrive at and leave the building and patient room,    And again after you touch your mask or anything in the room.     Go directly to and from the room you are visiting.     Stay in the  patient s room during your visit. Limit going to other places in the hospital as much as possible     Leave bags and jackets at home or in the car.     For everyone s health, please don t come and go during your visit. That includes for smoking   during your visit.

## 2025-04-29 NOTE — H&P (VIEW-ONLY)
Pre-Operative H & P     CC:  Preoperative exam to assess for increased cardiopulmonary risk while undergoing surgery and anesthesia.    Date of Encounter: 4/29/2025  Primary Care Physician:  No Ref-Primary, Physician     Reason for visit:   Encounter Diagnoses   Name Primary?    Pre-operative examination Yes    Hip instability, left        HPI  Quiana Shepherd is a 39 year old female who presents for pre-operative H & P in preparation for  Procedure Information       Case: [id number] Date/Time: 05/05/25 1145    Procedure: REVISION, TOTAL ARTHROPLASTY, HIP (Left: Hip)    Anesthesia type: Choice    Diagnosis: Hip instability, left [M25.352]    Pre-op diagnosis: Hip instability, left [M25.352]    Location:  OR 11 / [facility] OR    Providers: Jr Mohamud MD            The patient is a 39 year old woman who has a past medical history significant for acne, nevi, hirsutism and arthritis of the lumbar region and hip. She underwent left RADHA on 4/9/25. Since then she's had two occurrences of hip dislocation; once was when she was on the shower chair and the other was getting into the car. She is now scheduled for the follow up procedure as above.     History is obtained from the patient and chart review    Hx of abnormal bleeding or anti-platelet use: ASA 81 mg    Menstrual history: No LMP recorded (lmp unknown). (Menstrual status: IUD).:      Past Medical History  Past Medical History:   Diagnosis Date    Acne vulgaris 10/26/2017    Hip instability, left     Hirsutism     Osteoarthritis of left hip joint due to dysplasia        Past Surgical History  Past Surgical History:   Procedure Laterality Date    ARTHROPLASTY HIP Left 4/9/2025    Procedure: Left total hip arthroplasty;  Surgeon: Jr Mohamud MD;  Location: [facility] OR    BACK SURGERY  1993    Surgery to install halo    ORTHOPEDIC SURGERY  2011    Left hip - TIFFANIE repair       Prior to Admission Medications  Current Outpatient Medications   Medication Sig Dispense Refill     acetaminophen (TYLENOL) 325 MG tablet Take 2 tablets (650 mg) by mouth every 4 hours as needed for other (mild pain). 100 tablet 0    aspirin 81 MG EC tablet Take 1 tablet (81 mg) by mouth 2 times daily. 60 tablet 0    cholecalciferol (VITAMIN D3) 25 mcg (1000 units) capsule Take 1 capsule by mouth every morning.      Cyanocobalamin (B-12) 1000 MCG TBCR Take by mouth every morning.      levonorgestrel (MIRENA) 20 MCG/24HR IUD 1 each by Intrauterine route once      oxyCODONE (ROXICODONE) 5 MG tablet Take 1-2 tablets (5-10 mg) by mouth every 4 hours as needed for moderate to severe pain. 30 tablet 0    senna-docusate (SENOKOT-S/PERICOLACE) 8.6-50 MG tablet Take 1-2 tablets by mouth 2 times daily as needed for constipation. Take while on oral narcotics to prevent or treat constipation. 30 tablet 0    spironolactone (ALDACTONE) 50 MG tablet Take 100 mg by mouth every morning.      oxyCODONE (ROXICODONE) 5 MG tablet Take 1-2 tablets (5-10 mg) by mouth every 4 hours as needed for pain. (Patient taking differently: Take 5-10 mg by mouth every 4 hours as needed for pain.) 30 tablet 0       Allergies  Allergies   Allergen Reactions    Contrast Dye Other (See Comments)     Notes thyroid swelling after a contrast injection for a lumbar LUISA       Social History  Social History     Socioeconomic History    Marital status: Single     Spouse name: Not on file    Number of children: Not on file    Years of education: Not on file    Highest education level: Not on file   Occupational History    Not on file   Tobacco Use    Smoking status: Never     Passive exposure: Never    Smokeless tobacco: Never   Substance and Sexual Activity    Alcohol use: Yes     Alcohol/week: 9.0 standard drinks of alcohol     Types: 9 Standard drinks or equivalent per week     Comment: 1 beer every 3 weeks    Drug use: Not Currently     Types: Marijuana     Comment: Vape or edibles    Sexual activity: Yes     Partners: Male     Birth control/protection:  I.U.D.   Other Topics Concern    Parent/sibling w/ CABG, MI or angioplasty before 65F 55M? No   Social History Narrative    Not on file     Social Drivers of Health     Financial Resource Strain: Low Risk  (2025)    Financial Resource Strain     Within the past 12 months, have you or your family members you live with been unable to get utilities (heat, electricity) when it was really needed?: No   Food Insecurity: Low Risk  (2025)    Food Insecurity     Within the past 12 months, did you worry that your food would run out before you got money to buy more?: No     Within the past 12 months, did the food you bought just not last and you didn t have money to get more?: No   Transportation Needs: Low Risk  (2025)    Transportation Needs     Within the past 12 months, has lack of transportation kept you from medical appointments, getting your medicines, non-medical meetings or appointments, work, or from getting things that you need?: No   Physical Activity: Not on file   Stress: Not on file   Social Connections: Not on file   Interpersonal Safety: Low Risk  (2025)    Interpersonal Safety     Do you feel physically and emotionally safe where you currently live?: Yes     Within the past 12 months, have you been hit, slapped, kicked or otherwise physically hurt by someone?: No     Within the past 12 months, have you been humiliated or emotionally abused in other ways by your partner or ex-partner?: No   Housing Stability: Low Risk  (2025)    Housing Stability     Do you have housing? : Yes     Are you worried about losing your housing?: No       Family History  Family History   Problem Relation Age of Onset    Cancer Mother         melanoma    Melanoma Mother     Osteoporosis Mother     Thyroid Disease Mother     Coronary Artery Disease Father          of heart attack at age 79    Hypertension Father     Prostate Cancer Father     Thyroid Disease Sister     Diabetes Maternal Grandmother      Cerebrovascular Disease Maternal Grandmother     Breast Cancer Maternal Grandmother     Depression Maternal Grandmother     Mental Illness Maternal Grandmother     Coronary Artery Disease Maternal Grandfather          of a heart attack in his 50s    Breast Cancer Paternal Grandmother     Substance Abuse Paternal Grandfather     Cancer Other         Maternal aunt has a hx of melanoma    Melanoma Other         maternal aunt    Skin Cancer No family hx of     Anesthesia Reaction No family hx of     Bleeding Disorder No family hx of     Clotting Disorder No family hx of        Review of Systems  The complete review of systems is negative other than noted in the HPI or here.   Anesthesia Evaluation   Pt has had prior anesthetic. Type: General and Regional.    History of anesthetic complications  - PONV.  Post op vomiting with halo procedure. No issues with left hip surgery.    ROS/MED HX  ENT/Pulmonary:  - neg pulmonary ROS  (-) tobacco use   Neurologic:  - neg neurologic ROS  (-) no seizures, no CVA and no TIA   Cardiovascular:       METS/Exercise Tolerance: 1 - Eating, dressing Comment: Patient currently limited secondary to hip pain and is not mobile due to recurrent hip dislocations    Hematologic:     (+)      anemia,       (-) history of blood clots and history of blood transfusion   Musculoskeletal: Comment: S/p left RADHA 25 with hip dislocation x2 in  and   (+)  arthritis,             GI/Hepatic:  - neg GI/hepatic ROS  (-) GERD   Renal/Genitourinary:  - neg Renal ROS     Endo:  - neg endo ROS     Psychiatric/Substance Use:  - neg psychiatric ROS     Infectious Disease:  - neg infectious disease ROS     Malignancy:  - neg malignancy ROS     Other: Comment: Acne  Hirsutism  nevi           Virtual visit -  No vitals were obtained    Physical Exam  Constitutional: Awake, alert, cooperative, no apparent distress, and appears stated age.  Eyes: Pupils equal  HENT: Normocephalic  Respiratory: non labored  breathing   Neurologic: Awake, alert, oriented to name, place and time.   Neuropsychiatric: Calm, cooperative. Normal affect.      Prior Labs/Diagnostic Studies   All labs and imaging personally reviewed     EKG/ stress test - if available please see in ROS above       The patient's records and results personally reviewed by this provider.     Outside records reviewed from: Care Everywhere      Assessment    Quiana Shepherd is a 39 year old female seen as a PAC referral for risk assessment and optimization for anesthesia.    Plan/Recommendations  Pt will be optimized for the proposed procedure.  See below for details on the assessment, risk, and preoperative recommendations    NEUROLOGY  - No history of TIA, CVA or seizure  - Chronic Pain - has oxycodone 5 mg PRN but hasn't needed since last hip dislocation reduction. Continue tylenol.   -Post Op delirium risk factors:  No risk identified     ENT  - No current airway concerns.  Will need to be reassessed day of surgery.  Mallampati: I  TM: > 3     CARDIAC  - No history of CAD, Hypertension, and Afib  - METS (Metabolic Equivalents)  Patient CANNOT perform 4 METS exercise             Total Score: 1    Functional Capacity: Unable to complete 4 METS      RCRI-Very low risk: Class 1 0.4% complication rate             Total Score: 0    The patient is limited secondary to her hip pain and recent hip dislocations. She denies any cardiac symptoms and has no RCRI risk factors. No further testing indicated.      PULMONARY  - Obstructive Sleep Apnea  No current risk of obstructive sleep apnea   SEAN Low Risk             Total Score: 0      - Denies asthma or inhaler use  - Tobacco History        History   Smoking Status    Never   Smokeless Tobacco    Never   ~We discussed no vaping cannabis for 24 hours prior and no edible cannabis for 8 hours prior.       GI  PONV High Risk  Total Score: 4           1 AN PONV: Pt is Female    1 AN PONV: Patient is not a current smoker    1 AN  "PONV: Patient has history of PONV    1 AN PONV: Intended Post Op Opioids    ~ PONV was well controlled with zofran after surgery.      /RENAL  - Baseline Creatinine  0.81        ENDOCRINE    - BMI: Estimated body mass index is 27.19 kg/m  as calculated from the following:    Height as of this encounter: 1.702 m (5' 7\").    Weight as of this encounter: 78.7 kg (173 lb 9.6 oz).  Overweight (BMI 25.0-29.9)  - No history of Diabetes Mellitus    HEME  VTE Low Risk 0.26%             Total Score: 0      - Platelet disfunction second to Aspirin (Robbin, many others) - the patient is currently on ASA 81 mg BID for post total joint DVT prevention. Have reached out to Dr. Mohamud's team to see their recommendations on holding 7 days vs dropping to ASA 81 mg once daily and continue through surgery.   - The patient has post operative mild anemia with hgb 11.6. The last time she was in the ER she had elevated white count and platelets which were normal on labs a few days prior. Likely secondary to trauma. Given the patient's surgery is a repeat total joint would recommend a type and screen and repeat CBC. However, as the last time the patient tried to get into the car she dislocated her hip we discussed risk vs benefit of getting labs ahead of time. We will not plan on getting labs but will need consideration for type and screen and repeat CBC on the day of surgery if felt it would be needed.     MSK  Patient IS Frail             Total Score: 4    Frailty: Slower walking speed    Frailty: Decrease in strength    Frailty: Increased exhaustion    Frailty: Overall lack of energy      Will plan PM&R referral for the patient.     DERM  - Acne/ nevi/ hirsutism - continue medications but hold aldactone DOS.     Different anesthesia methods/types have been discussed with the patient, but they are aware that the final plan will be decided by the assigned anesthesia provider on the date of service.    The patient is optimized for their " procedure. AVS with information on surgery time/arrival time, meds and NPO status given by nursing staff. No further diagnostic testing indicated.    Please refer to the physical examination documented by the anesthesiologist in the anesthesia record on the day of surgery.    Video-Visit Details    Type of service:  Video Visit    Provider received verbal consent for a Video Visit from the patient? Yes     Originating Location (pt. Location): Home    Distant Location (provider location):  Off-site  Mode of Communication:  Video Conference via Noteg[employer]  On the day of service:     Prep time: 12 minutes  Visit time: 20 minutes  Documentation time: 13 minutes  ------------------------------------------  Total time: 45 minutes      Radha Calvert PA-C  Preoperative Assessment Center  [facility]  Clinic and Surgery Center  Phone: [phone]  Fax: [phone]

## 2025-04-29 NOTE — PROGRESS NOTES
Quiana is a 39 year old who is being evaluated via a billable video visit.      How would you like to obtain your AVS? MyChart          Subjective   Quiana is a 39 year old, presenting for the following health issues:  Pre-Op Exam              YOLI Cee LPN

## 2025-04-29 NOTE — H&P
Pre-Operative H & P     CC:  Preoperative exam to assess for increased cardiopulmonary risk while undergoing surgery and anesthesia.    Date of Encounter: 4/29/2025  Primary Care Physician:  No Ref-Primary, Physician     Reason for visit:   Encounter Diagnoses   Name Primary?    Pre-operative examination Yes    Hip instability, left        HPI  Quiana Shepherd is a 39 year old female who presents for pre-operative H & P in preparation for  Procedure Information       Case: [id number] Date/Time: 05/05/25 1145    Procedure: REVISION, TOTAL ARTHROPLASTY, HIP (Left: Hip)    Anesthesia type: Choice    Diagnosis: Hip instability, left [M25.352]    Pre-op diagnosis: Hip instability, left [M25.352]    Location:  OR 11 / [facility]    Providers: Jr Mohamud MD            The patient is a 39 year old woman who has a past medical history significant for acne, nevi, hirsutism and arthritis of the lumbar region and hip. She underwent left RADHA on 4/9/25. Since then she's had two occurrences of hip dislocation; once was when she was on the shower chair and the other was getting into the car. She is now scheduled for the follow up procedure as above.     History is obtained from the patient and chart review    Hx of abnormal bleeding or anti-platelet use: ASA 81 mg    Menstrual history: No LMP recorded (lmp unknown). (Menstrual status: IUD).:      Past Medical History  Past Medical History:   Diagnosis Date    Acne vulgaris 10/26/2017    Hip instability, left     Hirsutism     Osteoarthritis of left hip joint due to dysplasia        Past Surgical History  Past Surgical History:   Procedure Laterality Date    ARTHROPLASTY HIP Left 4/9/2025    Procedure: Left total hip arthroplasty;  Surgeon: Jr Mohamud MD;  Location: [facility]    BACK SURGERY  1993    Surgery to install halo    ORTHOPEDIC SURGERY  2011    Left hip - TIFFANIE repair       Prior to Admission Medications  Current Outpatient Medications   Medication Sig Dispense Refill     acetaminophen (TYLENOL) 325 MG tablet Take 2 tablets (650 mg) by mouth every 4 hours as needed for other (mild pain). 100 tablet 0    aspirin 81 MG EC tablet Take 1 tablet (81 mg) by mouth 2 times daily. 60 tablet 0    cholecalciferol (VITAMIN D3) 25 mcg (1000 units) capsule Take 1 capsule by mouth every morning.      Cyanocobalamin (B-12) 1000 MCG TBCR Take by mouth every morning.      levonorgestrel (MIRENA) 20 MCG/24HR IUD 1 each by Intrauterine route once      oxyCODONE (ROXICODONE) 5 MG tablet Take 1-2 tablets (5-10 mg) by mouth every 4 hours as needed for moderate to severe pain. 30 tablet 0    senna-docusate (SENOKOT-S/PERICOLACE) 8.6-50 MG tablet Take 1-2 tablets by mouth 2 times daily as needed for constipation. Take while on oral narcotics to prevent or treat constipation. 30 tablet 0    spironolactone (ALDACTONE) 50 MG tablet Take 100 mg by mouth every morning.      oxyCODONE (ROXICODONE) 5 MG tablet Take 1-2 tablets (5-10 mg) by mouth every 4 hours as needed for pain. (Patient taking differently: Take 5-10 mg by mouth every 4 hours as needed for pain.) 30 tablet 0       Allergies  Allergies   Allergen Reactions    Contrast Dye Other (See Comments)     Notes thyroid swelling after a contrast injection for a lumbar LUISA       Social History  Social History     Socioeconomic History    Marital status: Single     Spouse name: Not on file    Number of children: Not on file    Years of education: Not on file    Highest education level: Not on file   Occupational History    Not on file   Tobacco Use    Smoking status: Never     Passive exposure: Never    Smokeless tobacco: Never   Substance and Sexual Activity    Alcohol use: Yes     Alcohol/week: 9.0 standard drinks of alcohol     Types: 9 Standard drinks or equivalent per week     Comment: 1 beer every 3 weeks    Drug use: Not Currently     Types: Marijuana     Comment: Vape or edibles    Sexual activity: Yes     Partners: Male     Birth control/protection:  I.U.D.   Other Topics Concern    Parent/sibling w/ CABG, MI or angioplasty before 65F 55M? No   Social History Narrative    Not on file     Social Drivers of Health     Financial Resource Strain: Low Risk  (2025)    Financial Resource Strain     Within the past 12 months, have you or your family members you live with been unable to get utilities (heat, electricity) when it was really needed?: No   Food Insecurity: Low Risk  (2025)    Food Insecurity     Within the past 12 months, did you worry that your food would run out before you got money to buy more?: No     Within the past 12 months, did the food you bought just not last and you didn t have money to get more?: No   Transportation Needs: Low Risk  (2025)    Transportation Needs     Within the past 12 months, has lack of transportation kept you from medical appointments, getting your medicines, non-medical meetings or appointments, work, or from getting things that you need?: No   Physical Activity: Not on file   Stress: Not on file   Social Connections: Not on file   Interpersonal Safety: Low Risk  (2025)    Interpersonal Safety     Do you feel physically and emotionally safe where you currently live?: Yes     Within the past 12 months, have you been hit, slapped, kicked or otherwise physically hurt by someone?: No     Within the past 12 months, have you been humiliated or emotionally abused in other ways by your partner or ex-partner?: No   Housing Stability: Low Risk  (2025)    Housing Stability     Do you have housing? : Yes     Are you worried about losing your housing?: No       Family History  Family History   Problem Relation Age of Onset    Cancer Mother         melanoma    Melanoma Mother     Osteoporosis Mother     Thyroid Disease Mother     Coronary Artery Disease Father          of heart attack at age 79    Hypertension Father     Prostate Cancer Father     Thyroid Disease Sister     Diabetes Maternal Grandmother      Cerebrovascular Disease Maternal Grandmother     Breast Cancer Maternal Grandmother     Depression Maternal Grandmother     Mental Illness Maternal Grandmother     Coronary Artery Disease Maternal Grandfather          of a heart attack in his 50s    Breast Cancer Paternal Grandmother     Substance Abuse Paternal Grandfather     Cancer Other         Maternal aunt has a hx of melanoma    Melanoma Other         maternal aunt    Skin Cancer No family hx of     Anesthesia Reaction No family hx of     Bleeding Disorder No family hx of     Clotting Disorder No family hx of        Review of Systems  The complete review of systems is negative other than noted in the HPI or here.   Anesthesia Evaluation   Pt has had prior anesthetic. Type: General and Regional.    History of anesthetic complications  - PONV.  Post op vomiting with halo procedure. No issues with left hip surgery.    ROS/MED HX  ENT/Pulmonary:  - neg pulmonary ROS  (-) tobacco use   Neurologic:  - neg neurologic ROS  (-) no seizures, no CVA and no TIA   Cardiovascular:       METS/Exercise Tolerance: 1 - Eating, dressing Comment: Patient currently limited secondary to hip pain and is not mobile due to recurrent hip dislocations    Hematologic:     (+)      anemia,       (-) history of blood clots and history of blood transfusion   Musculoskeletal: Comment: S/p left RADHA 25 with hip dislocation x2 in  and   (+)  arthritis,             GI/Hepatic:  - neg GI/hepatic ROS  (-) GERD   Renal/Genitourinary:  - neg Renal ROS     Endo:  - neg endo ROS     Psychiatric/Substance Use:  - neg psychiatric ROS     Infectious Disease:  - neg infectious disease ROS     Malignancy:  - neg malignancy ROS     Other: Comment: Acne  Hirsutism  nevi           Virtual visit -  No vitals were obtained    Physical Exam  Constitutional: Awake, alert, cooperative, no apparent distress, and appears stated age.  Eyes: Pupils equal  HENT: Normocephalic  Respiratory: non labored  breathing   Neurologic: Awake, alert, oriented to name, place and time.   Neuropsychiatric: Calm, cooperative. Normal affect.      Prior Labs/Diagnostic Studies   All labs and imaging personally reviewed     EKG/ stress test - if available please see in ROS above       The patient's records and results personally reviewed by this provider.     Outside records reviewed from: Care Everywhere      Assessment    Quiana Shepherd is a 39 year old female seen as a PAC referral for risk assessment and optimization for anesthesia.    Plan/Recommendations  Pt will be optimized for the proposed procedure.  See below for details on the assessment, risk, and preoperative recommendations    NEUROLOGY  - No history of TIA, CVA or seizure  - Chronic Pain - has oxycodone 5 mg PRN but hasn't needed since last hip dislocation reduction. Continue tylenol.   -Post Op delirium risk factors:  No risk identified     ENT  - No current airway concerns.  Will need to be reassessed day of surgery.  Mallampati: I  TM: > 3     CARDIAC  - No history of CAD, Hypertension, and Afib  - METS (Metabolic Equivalents)  Patient CANNOT perform 4 METS exercise             Total Score: 1    Functional Capacity: Unable to complete 4 METS      RCRI-Very low risk: Class 1 0.4% complication rate             Total Score: 0    The patient is limited secondary to her hip pain and recent hip dislocations. She denies any cardiac symptoms and has no RCRI risk factors. No further testing indicated.      PULMONARY  - Obstructive Sleep Apnea  No current risk of obstructive sleep apnea   SEAN Low Risk             Total Score: 0      - Denies asthma or inhaler use  - Tobacco History        History   Smoking Status    Never   Smokeless Tobacco    Never   ~We discussed no vaping cannabis for 24 hours prior and no edible cannabis for 8 hours prior.       GI  PONV High Risk  Total Score: 4           1 AN PONV: Pt is Female    1 AN PONV: Patient is not a current smoker    1 AN  "PONV: Patient has history of PONV    1 AN PONV: Intended Post Op Opioids    ~ PONV was well controlled with zofran after surgery.      /RENAL  - Baseline Creatinine  0.81        ENDOCRINE    - BMI: Estimated body mass index is 27.19 kg/m  as calculated from the following:    Height as of this encounter: 1.702 m (5' 7\").    Weight as of this encounter: 78.7 kg (173 lb 9.6 oz).  Overweight (BMI 25.0-29.9)  - No history of Diabetes Mellitus    HEME  VTE Low Risk 0.26%             Total Score: 0      - Platelet disfunction second to Aspirin (Robbin, many others) - the patient is currently on ASA 81 mg BID for post total joint DVT prevention. Have reached out to Dr. Mohamud's team to see their recommendations on holding 7 days vs dropping to ASA 81 mg once daily and continue through surgery.   - The patient has post operative mild anemia with hgb 11.6. The last time she was in the ER she had elevated white count and platelets which were normal on labs a few days prior. Likely secondary to trauma. Given the patient's surgery is a repeat total joint would recommend a type and screen and repeat CBC. However, as the last time the patient tried to get into the car she dislocated her hip we discussed risk vs benefit of getting labs ahead of time. We will not plan on getting labs but will need consideration for type and screen and repeat CBC on the day of surgery if felt it would be needed.     MSK  Patient IS Frail             Total Score: 4    Frailty: Slower walking speed    Frailty: Decrease in strength    Frailty: Increased exhaustion    Frailty: Overall lack of energy      Will plan PM&R referral for the patient.     DERM  - Acne/ nevi/ hirsutism - continue medications but hold aldactone DOS.     Different anesthesia methods/types have been discussed with the patient, but they are aware that the final plan will be decided by the assigned anesthesia provider on the date of service.    The patient is optimized for their " procedure. AVS with information on surgery time/arrival time, meds and NPO status given by nursing staff. No further diagnostic testing indicated.    Please refer to the physical examination documented by the anesthesiologist in the anesthesia record on the day of surgery.    Video-Visit Details    Type of service:  Video Visit    Provider received verbal consent for a Video Visit from the patient? Yes     Originating Location (pt. Location): Home    Distant Location (provider location):  Off-site  Mode of Communication:  Video Conference via Pulse[employer]  On the day of service:     Prep time: 12 minutes  Visit time: 20 minutes  Documentation time: 13 minutes  ------------------------------------------  Total time: 45 minutes      Radha Calvert PA-C  Preoperative Assessment Center  [facility]  Phone: [phone]  Fax: [phone]

## 2025-04-29 NOTE — PROVIDER NOTIFICATION
04/29/25 1133   Living Arrangements   People in Home significant other   Type of Residence Private Residence   Is your private residence a single family home or apartment? Single family home   Number of Stairs, Within Home, Primary greater than 10 stairs   Stair Railings, Within Home, Primary railing on right side (ascending)   Once home, are you able to live on one level? Yes   Which level? Main Level   Bathroom Shower/Tub Tub/Shower unit   Equipment Currently Used at Home tub bench;walker, rolling;cane, straight;raised toilet seat;other (see comments)  (abduction brace)   Support System   Support Systems Spouse/Significant Other   Do you have someone available to stay with you one or two nights once you are home? Yes   Medical Clearance   Date of Physical 04/29/25   Clinic Name PAC   It is recommended that you call and check with any specialty providers before surgery to see if you need surgical clearance.  Do you see any specialty providers outside of your primary care provider? No   Blood   Known Bleeding Disorder or Coagulopathy No   Does the patient have any Hoahaoism/cultural preferences related to blood products? No   Education   Has the patient scheduled or completed pre-op total joint education, either in class or online, in the last 12 months? Yes   Patient attended total joint pre-op class/received pre-op teaching  online   Falls Risk   Has the patient been identified as a high falls risk before surgery? (fallen in the last 6 months, history of falls, unsteady gait, or balance issues) Yes   Did an order get placed to attend outpatient pre-surgery balance evaluation? No

## 2025-04-30 ENCOUNTER — PATIENT OUTREACH (OUTPATIENT)
Dept: CARE COORDINATION | Facility: CLINIC | Age: 40
End: 2025-04-30

## 2025-04-30 NOTE — DISCHARGE SUMMARY
ORTHOPAEDIC SURGERY DISCHARGE SUMMARY     Date of Admission: 4/23/2025  Date of Discharge: 4/24/2025  5:21 PM  Disposition: Home  Staff Physician: No att. providers found  Primary Care Provider: No Ref-Primary, Physician    DISCHARGE DIAGNOSIS: Anterior dislocation of prosthetic hip, left    PROCEDURES:  Closed Reduction of Hip Dislocation in the emergency department    BRIEF HISTORY:  This is a 39 year old patient who has been followed in clinic. Please refer to that documentation for full details. 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 Mohamud on 4/9/2025 .     HOSPITAL COURSE:    The patient was admitted following the above listed procedures for pain control and rehabilitation. Quiana Shepherd did well after close reduction in the emergency department however was admitted due to the patient's apprehension. . The patient received routine nursing cares and at the time of discharge was medically stable. Vital signs were stable throughout admission. The patient is tolerating a regular diet and is voiding spontaneously. All PT/OT goals have been met for safe mobility. Pain is now controlled on oral medications which will be available on discharge. Stool softeners have been used while taking pain medications to help prevent constipation. Quiana Shepherd is deemed medically safe to discharge.     DVT prophylaxis:  Mechanical  PT Progress:  Has met PT/OT goals for safe mobility.    Pain Meds:  Weaned off all IV pain meds by discharge.  Inpatient Events: No significant events or complications.     PHYSICAL EXAM:    Gen: alert and oriented, responds to questions appropriately  Resp: non-labored on RA  MSK:  LLE:  Surgical incision well healing without purulence, drainage or surrounding erythema   Hip Abduction brace on   - SILT tibial/sural/saphenous/DP/SP nerves  - Fires  TA, EHL, FHL, GaSC  - PT/DP pulses 2+, foot wwp    FOLLOWUP:     Follow up with Dr. Mohamud    Future Appointments   Date Time Provider Department Center   5/22/2025 10:20 AM Jr Mohamud MD [facility]       Orthopaedic Surgery appointments are at the [facility] ([address] [place] [id number]). Call [phone] to schedule a follow-up appointment at this location with your provider.     PLANNED DISCHARGE ORDERS:     DVT Prophylaxis: Mechanical     Activity: WBAT  (Avoid any hyperextension of the hip. Continue posterior hip precautions as well. Hip will be more stable in a flexed position )     Wound Care: see Below      Discharge Medication List as of 4/24/2025  4:45 PM        CONTINUE these medications which have NOT CHANGED    Details   acetaminophen (TYLENOL) 325 MG tablet Take 2 tablets (650 mg) by mouth every 4 hours as needed for other (mild pain)., Disp-100 tablet, R-0, E-Prescribe      aspirin 81 MG EC tablet Take 1 tablet (81 mg) by mouth 2 times daily., Disp-60 tablet, R-0, E-PrescribeFor prevention of blood clots      cholecalciferol (VITAMIN D3) 25 mcg (1000 units) capsule Take 1 capsule by mouth daily., Historical      Cyanocobalamin (B-12) 1000 MCG TBCR Take by mouth., Historical      levonorgestrel (MIRENA) 20 MCG/24HR IUD 1 each by Intrauterine route onceHistorical      !! oxyCODONE (ROXICODONE) 5 MG tablet Take 1-2 tablets (5-10 mg) by mouth every 4 hours as needed for pain., Disp-30 tablet, R-0, E-Prescribe      !! oxyCODONE (ROXICODONE) 5 MG tablet Take 1-2 tablets (5-10 mg) by mouth every 4 hours as needed for moderate to severe pain., Disp-30 tablet, R-0, E-Prescribe      senna-docusate (SENOKOT-S/PERICOLACE) 8.6-50 MG tablet Take 1-2 tablets by mouth 2 times daily as needed for constipation. Take while on oral narcotics to prevent or treat constipation., Disp-30 tablet, R-0, E-Prescribe       !! - Potential duplicate medications found. Please discuss with provider.        STOP taking these medications        "ondansetron (ZOFRAN ODT) 4 MG ODT tab Comments:   Reason for Stopping:                 Discharge Procedure Orders   Home Care Referral   Referral Priority: Routine: Next available opening Referral Type: Home Health Therapies & Aides   Number of Visits Requested: 1     Reason for your hospital stay   Order Comments: LEFT Anterior Hip Dislocation- Closed Reduced ED     When to call - Contact Surgeon Team   Order Comments: You may experience symptoms that require follow-up before your scheduled appointment. Refer to the \"[employer] Tool\" for instructions on when to contact your Surgeon Team if you are concerned about pain control, blood clots, constipation, or if you are unable to urinate.     When to call - Reach out to Urgent Care   Order Comments: If you are not able to reach your Surgeon Team and you need immediate care, go to the Orthopedic Walk-in Clinic or Urgent Care at your Surgeon's office.  Do NOT go to the Emergency Room unless you have shortness of breath, chest pain, or other signs of a medical emergency.     When to call - Reasons to Call 911   Order Comments: Call 911 immediately if you experience sudden-onset chest pain, arm weakness/numbness, slurred speech, or shortness of breath     Discharge Instruction - Breathing exercises   Order Comments: Perform breathing exercises 10 times per hours while awake for 2 weeks. (If given, use your Incentive Spirometer)     Symptoms - Fever Management   Order Comments: A low grade fever can be expected after surgery.  Use acetaminophen (TYLENOL) as needed for fever management.  Contact your Surgeon Team if you have a fever greater than 101.5 F, chills, and/or night sweats.     Symptoms - Constipation management   Order Comments: Constipation (hard, dry bowel movements) is expected after surgery due to the combination of being less active, the anesthetic, and the opioid pain medication.  You can do the following to help reduce constipation:  ~  FLUIDS:  Drink clear " liquids (water or Gatorade), or juice (apple/prune).  ~  DIET:  Eat a fiber rich diet.    ~  ACTIVITY:  Get up and move around several times a day.  Increase your activity as you are able.  MEDICATIONS:  Reduce the risk of constipation by starting medications before you are constipated.  You can take Miralax   (1 packet as directed) and/or a stool softener (Senokot 1-2 tablets 1-2 times a day).  If you already have constipation and these medications are not working, you can get magnesium citrate and use as directed.  If you continue to have constipation you can try an over the counter suppository or enema.  Call your Surgeon Team if it has been greater than 3 days since your last bowel movement.     Symptoms - Reduced Urine Output   Order Comments: Changes in the amount of fluids you drank before and after surgery may result in problems urinating.  It is important to stay well-hydrated after surgery and drink plenty of water. If it has been greater than 8 hours since you have urinated despite drinking plenty of water, call your Surgeon Team.     Activity - Exercises to prevent blood clots   Order Comments: Unless otherwise directed by your Surgeon team, perform the following exercises at least three times per day for the first four weeks after surgery to prevent blood clots in your legs: 1) Point and flex your feet (Ankle Pumps), 2) Move your ankle around in big circles, 3) Wiggle your toes, 4) Walk, even for short distances, several times a day, will help decrease the risk of blood clots.     Order Specific Question Answer Comments   Is discharge order? Yes      Comfort and Pain Management - Pain after Surgery   Order Comments: Pain after surgery is normal and expected.  You will have some amount of pain for several weeks after surgery.  Your pain will improve with time.  There are several things you can do to help reduce your pain including: rest, ice, elevation, and using pain medications as needed. Contact your  Surgeon Team if you have pain that persists or worsens after surgery despite rest, ice, elevation, and taking your medication(s) as prescribed. Contact your Surgeon Team if you have new numbness, tingling, or weakness in your operative extremity.     Comfort and Pain Management - Swelling after Surgery   Order Comments: Swelling and/or bruising of the surgical extremity is common and may persist for several months after surgery. In addition to frequent icing and elevation, gentle compressive support with an ACE wrap or tubigrip may help with swelling. Apply compression regularly, removing at least twice daily to perform skin checks. Contact your Surgeon Team if your swelling increases and is NOT associated with an increase in your activity level, or if your swelling increases and is associated with redness and pain.     Comfort and Pain Management - Cold therapy   Order Comments: Ice can be used to control swelling and discomfort after surgery. Place a thin towel over your operative site and apply the ice pack overtop. Leave ice pack in place for 20 minutes, then remove for 20 minutes. Repeat this 20 minutes on/20 minutes off routine as often as tolerated.     Medication Instructions - Acetaminophen (TYLENOL) Instructions   Order Comments: You were discharged with acetaminophen (TYLENOL) for pain management after surgery. Acetaminophen most effectively manages pain symptoms when it is taken on a schedule without missing doses (every four, six, or eight hours). Your Provider will prescribe a safe daily dose between 3000 - 4000 mg.  Do NOT exceed this daily dose. Most patients use acetaminophen for pain control for the first four weeks after surgery.  You can wean from this medication as your pain decreases.     Medication Instructions - NSAID Instructions   Order Comments: You were discharged with an anti-inflammatory medication for pain management to use in combination with acetaminophen (TYLENOL) and the narcotic  "pain medication.  Take this medication exactly as directed.  You should only take one anti-inflammatory at a time.  Some common anti-inflammatories include: ibuprofen (ADVIL, MOTRIN), naproxen (ALEVE, NAPROSYN), celecoxib (CELEBREX), meloxicam (MOBIC), ketorolac (TORADOL).  Take this medication with food and water.     Follow Up Care   Order Comments: Follow-up with your Surgeon Team in 2 weeks for wound check.     Medication instructions -  Anticoagulation - aspirin   Order Comments: Take the aspirin as prescribed for a total of four weeks after surgery.  This is given to help minimize your risk of blood clot.     Comfort and Pain Management - LOWER Extremity Elevation   Order Comments: Swelling is expected for several months after surgery. This type of swelling is usually associated with gravity and activity, and can be improved with elevation.   The best way to do this is to get your \"toes above your nose\" by laying down and placing several pillows lengthwise under your calf and heel. When elevating your leg keep your knee completely straight. Perform this elevation as often as possible especially for the first two weeks after surgery.     Activity - Total Hip Arthroplasty   Order Comments: Refer to the [facility] \"Your Guide to Total Joint Replacement\" for recommendations on activities and Exercises.     Order Specific Question Answer Comments   Is discharge order? Yes      Return to Driving   Order Comments: Return to driving - Driving is NOT permitted until directed by your provider. Under no circumstance are you permitted to drive while using narcotic pain medications.     Order Specific Question Answer Comments   Is discharge order? Yes      Dressing / Wound Care - Wound   Order Comments: You have a clean dressing on your surgical wound. Dressing change instructions as follows: Primapore dressing may stay on for 1 more week and can be removed and leave incision open to air. Contact your Surgeon Team " if you have increased redness, warmth around the surgical wound, and/or drainage from the surgical wound.     Dressing / Wound Care - NO Tub Bathing   Order Comments: Tub bathing, swimming, or any other activities that will cause your incision to be submerged in water should be avoided until allowed by your Surgeon.     Weight bearing as tolerated   Order Comments: Weight bearing as tolerated on your operative extremity.     Order Specific Question Answer Comments   Is discharge order? Yes      Dressing / Wound care - Shower with wound/dressing covered   Order Comments: You must COVER your dressing or incision with saran wrap (or any other non-permeable covering) to allow the incision to remain dry while showering. You may shower 2 days after surgery as long as the surgical wound stays dry. Continue to cover your dressing or incision for showering until your first office visit. You are strictly prohibited from soaking or submerging the surgical wound underwater.     Discharge Instruction - Regular Diet Adult   Order Comments: Return to your pre-surgery diet unless instructed otherwise     Order Specific Question Answer Comments   Is discharge order? Yes            Kym Shi MD  Orthopedic Surgery Resident

## 2025-05-05 ENCOUNTER — APPOINTMENT (OUTPATIENT)
Dept: GENERAL RADIOLOGY | Facility: CLINIC | Age: 40
End: 2025-05-05
Payer: COMMERCIAL

## 2025-05-05 ENCOUNTER — HOSPITAL ENCOUNTER (OUTPATIENT)
Facility: CLINIC | Age: 40
Discharge: HOME | End: 2025-05-06
Attending: ORTHOPAEDIC SURGERY | Admitting: ORTHOPAEDIC SURGERY
Payer: COMMERCIAL

## 2025-05-05 ENCOUNTER — ANESTHESIA (OUTPATIENT)
Dept: SURGERY | Facility: CLINIC | Age: 40
End: 2025-05-05
Payer: COMMERCIAL

## 2025-05-05 DIAGNOSIS — T84.021A DISLOCATION OF INTERNAL LEFT HIP PROSTHESIS, INITIAL ENCOUNTER: Primary | ICD-10-CM

## 2025-05-05 LAB — GLUCOSE BLDC GLUCOMTR-MCNC: 114 MG/DL (ref 70–99)

## 2025-05-05 PROCEDURE — 272N000001 HC OR GENERAL SUPPLY STERILE: Performed by: ORTHOPAEDIC SURGERY

## 2025-05-05 PROCEDURE — 250N000011 HC RX IP 250 OP 636: Mod: JZ | Performed by: ANESTHESIOLOGY

## 2025-05-05 PROCEDURE — 999N000141 HC STATISTIC PRE-PROCEDURE NURSING ASSESSMENT: Performed by: ORTHOPAEDIC SURGERY

## 2025-05-05 PROCEDURE — 250N000011 HC RX IP 250 OP 636

## 2025-05-05 PROCEDURE — 258N000003 HC RX IP 258 OP 636

## 2025-05-05 PROCEDURE — 250N000013 HC RX MED GY IP 250 OP 250 PS 637

## 2025-05-05 PROCEDURE — 258N000003 HC RX IP 258 OP 636: Performed by: NURSE ANESTHETIST, CERTIFIED REGISTERED

## 2025-05-05 PROCEDURE — 250N000011 HC RX IP 250 OP 636: Performed by: ANESTHESIOLOGY

## 2025-05-05 PROCEDURE — 82962 GLUCOSE BLOOD TEST: CPT

## 2025-05-05 PROCEDURE — 710N000010 HC RECOVERY PHASE 1, LEVEL 2, PER MIN: Performed by: ORTHOPAEDIC SURGERY

## 2025-05-05 PROCEDURE — C1776 JOINT DEVICE (IMPLANTABLE): HCPCS | Performed by: ORTHOPAEDIC SURGERY

## 2025-05-05 PROCEDURE — 99204 OFFICE O/P NEW MOD 45 MIN: CPT | Performed by: INTERNAL MEDICINE

## 2025-05-05 PROCEDURE — 27134 REVISE HIP JOINT REPLACEMENT: CPT | Mod: 78 | Performed by: ORTHOPAEDIC SURGERY

## 2025-05-05 PROCEDURE — 360N000078 HC SURGERY LEVEL 5, PER MIN: Performed by: ORTHOPAEDIC SURGERY

## 2025-05-05 PROCEDURE — 999N000065 XR PELVIS AND HIP PORTABLE LEFT 1 VIEW

## 2025-05-05 PROCEDURE — 250N000009 HC RX 250: Performed by: NURSE ANESTHETIST, CERTIFIED REGISTERED

## 2025-05-05 PROCEDURE — 250N000011 HC RX IP 250 OP 636: Performed by: NURSE ANESTHETIST, CERTIFIED REGISTERED

## 2025-05-05 PROCEDURE — 370N000017 HC ANESTHESIA TECHNICAL FEE, PER MIN: Performed by: ORTHOPAEDIC SURGERY

## 2025-05-05 DEVICE — R3 0 DEGREE XLPE ACETABULAR LINER                                    32MM ID X OD 48MM
Type: IMPLANTABLE DEVICE | Site: HIP | Status: FUNCTIONAL
Brand: R3

## 2025-05-05 DEVICE — OXINIUM FEMORAL HEAD 12/14 TAPER                                    32MM +8
Type: IMPLANTABLE DEVICE | Site: HIP | Status: FUNCTIONAL
Brand: OXINIUM

## 2025-05-05 RX ORDER — TRANEXAMIC ACID 650 MG/1
1950 TABLET ORAL ONCE
Status: COMPLETED | OUTPATIENT
Start: 2025-05-05 | End: 2025-05-05

## 2025-05-05 RX ORDER — NALOXONE HYDROCHLORIDE 0.4 MG/ML
0.4 INJECTION, SOLUTION INTRAMUSCULAR; INTRAVENOUS; SUBCUTANEOUS
Status: DISCONTINUED | OUTPATIENT
Start: 2025-05-05 | End: 2025-05-06 | Stop reason: HOSPADM

## 2025-05-05 RX ORDER — BISACODYL 10 MG
10 SUPPOSITORY, RECTAL RECTAL DAILY PRN
Status: DISCONTINUED | OUTPATIENT
Start: 2025-05-05 | End: 2025-05-06 | Stop reason: HOSPADM

## 2025-05-05 RX ORDER — NALOXONE HYDROCHLORIDE 0.4 MG/ML
0.2 INJECTION, SOLUTION INTRAMUSCULAR; INTRAVENOUS; SUBCUTANEOUS
Status: DISCONTINUED | OUTPATIENT
Start: 2025-05-05 | End: 2025-05-06 | Stop reason: HOSPADM

## 2025-05-05 RX ORDER — AMOXICILLIN 250 MG
1 CAPSULE ORAL 2 TIMES DAILY
Status: DISCONTINUED | OUTPATIENT
Start: 2025-05-05 | End: 2025-05-06 | Stop reason: HOSPADM

## 2025-05-05 RX ORDER — HYDROMORPHONE HYDROCHLORIDE 1 MG/ML
0.2 INJECTION, SOLUTION INTRAMUSCULAR; INTRAVENOUS; SUBCUTANEOUS
Status: DISCONTINUED | OUTPATIENT
Start: 2025-05-05 | End: 2025-05-06 | Stop reason: HOSPADM

## 2025-05-05 RX ORDER — SODIUM CHLORIDE, SODIUM LACTATE, POTASSIUM CHLORIDE, CALCIUM CHLORIDE 600; 310; 30; 20 MG/100ML; MG/100ML; MG/100ML; MG/100ML
INJECTION, SOLUTION INTRAVENOUS CONTINUOUS
Status: DISCONTINUED | OUTPATIENT
Start: 2025-05-05 | End: 2025-05-05

## 2025-05-05 RX ORDER — LIDOCAINE 40 MG/G
CREAM TOPICAL
Status: DISCONTINUED | OUTPATIENT
Start: 2025-05-05 | End: 2025-05-06 | Stop reason: HOSPADM

## 2025-05-05 RX ORDER — ACETAMINOPHEN 325 MG/1
975 TABLET ORAL ONCE
Status: COMPLETED | OUTPATIENT
Start: 2025-05-05 | End: 2025-05-05

## 2025-05-05 RX ORDER — CEFAZOLIN SODIUM/WATER 2 G/20 ML
2 SYRINGE (ML) INTRAVENOUS
Status: COMPLETED | OUTPATIENT
Start: 2025-05-05 | End: 2025-05-05

## 2025-05-05 RX ORDER — OXYCODONE HYDROCHLORIDE 10 MG/1
10 TABLET ORAL EVERY 4 HOURS PRN
Status: DISCONTINUED | OUTPATIENT
Start: 2025-05-05 | End: 2025-05-06 | Stop reason: HOSPADM

## 2025-05-05 RX ORDER — CEFAZOLIN SODIUM 1 G/3ML
1 INJECTION, POWDER, FOR SOLUTION INTRAMUSCULAR; INTRAVENOUS EVERY 8 HOURS
Status: COMPLETED | OUTPATIENT
Start: 2025-05-05 | End: 2025-05-06

## 2025-05-05 RX ORDER — FENTANYL CITRATE 50 UG/ML
25 INJECTION, SOLUTION INTRAMUSCULAR; INTRAVENOUS EVERY 5 MIN PRN
Status: DISCONTINUED | OUTPATIENT
Start: 2025-05-05 | End: 2025-05-05

## 2025-05-05 RX ORDER — ONDANSETRON 2 MG/ML
INJECTION INTRAMUSCULAR; INTRAVENOUS PRN
Status: DISCONTINUED | OUTPATIENT
Start: 2025-05-05 | End: 2025-05-05

## 2025-05-05 RX ORDER — ONDANSETRON 4 MG/1
4 TABLET, ORALLY DISINTEGRATING ORAL EVERY 6 HOURS PRN
Status: DISCONTINUED | OUTPATIENT
Start: 2025-05-05 | End: 2025-05-06 | Stop reason: HOSPADM

## 2025-05-05 RX ORDER — PROCHLORPERAZINE MALEATE 5 MG/1
10 TABLET ORAL EVERY 6 HOURS PRN
Status: DISCONTINUED | OUTPATIENT
Start: 2025-05-05 | End: 2025-05-06 | Stop reason: HOSPADM

## 2025-05-05 RX ORDER — NALOXONE HYDROCHLORIDE 0.4 MG/ML
0.1 INJECTION, SOLUTION INTRAMUSCULAR; INTRAVENOUS; SUBCUTANEOUS
Status: DISCONTINUED | OUTPATIENT
Start: 2025-05-05 | End: 2025-05-05

## 2025-05-05 RX ORDER — HYDROMORPHONE HYDROCHLORIDE 1 MG/ML
0.4 INJECTION, SOLUTION INTRAMUSCULAR; INTRAVENOUS; SUBCUTANEOUS
Status: DISCONTINUED | OUTPATIENT
Start: 2025-05-05 | End: 2025-05-06 | Stop reason: HOSPADM

## 2025-05-05 RX ORDER — ONDANSETRON 4 MG/1
4 TABLET, ORALLY DISINTEGRATING ORAL EVERY 30 MIN PRN
Status: DISCONTINUED | OUTPATIENT
Start: 2025-05-05 | End: 2025-05-05

## 2025-05-05 RX ORDER — SODIUM CHLORIDE, SODIUM LACTATE, POTASSIUM CHLORIDE, CALCIUM CHLORIDE 600; 310; 30; 20 MG/100ML; MG/100ML; MG/100ML; MG/100ML
INJECTION, SOLUTION INTRAVENOUS CONTINUOUS PRN
Status: DISCONTINUED | OUTPATIENT
Start: 2025-05-05 | End: 2025-05-05

## 2025-05-05 RX ORDER — SODIUM CHLORIDE, SODIUM LACTATE, POTASSIUM CHLORIDE, CALCIUM CHLORIDE 600; 310; 30; 20 MG/100ML; MG/100ML; MG/100ML; MG/100ML
INJECTION, SOLUTION INTRAVENOUS CONTINUOUS
Status: DISCONTINUED | OUTPATIENT
Start: 2025-05-05 | End: 2025-05-06 | Stop reason: HOSPADM

## 2025-05-05 RX ORDER — EPHEDRINE SULFATE 50 MG/ML
INJECTION, SOLUTION INTRAMUSCULAR; INTRAVENOUS; SUBCUTANEOUS PRN
Status: DISCONTINUED | OUTPATIENT
Start: 2025-05-05 | End: 2025-05-05

## 2025-05-05 RX ORDER — ACETAMINOPHEN 325 MG/1
975 TABLET ORAL EVERY 8 HOURS
Status: DISCONTINUED | OUTPATIENT
Start: 2025-05-05 | End: 2025-05-06 | Stop reason: HOSPADM

## 2025-05-05 RX ORDER — CEFAZOLIN SODIUM/WATER 2 G/20 ML
2 SYRINGE (ML) INTRAVENOUS SEE ADMIN INSTRUCTIONS
Status: DISCONTINUED | OUTPATIENT
Start: 2025-05-05 | End: 2025-05-05 | Stop reason: HOSPADM

## 2025-05-05 RX ORDER — BUPIVACAINE HYDROCHLORIDE 7.5 MG/ML
INJECTION, SOLUTION INTRASPINAL
Status: COMPLETED | OUTPATIENT
Start: 2025-05-05 | End: 2025-05-05

## 2025-05-05 RX ORDER — DEXAMETHASONE SODIUM PHOSPHATE 4 MG/ML
4 INJECTION, SOLUTION INTRA-ARTICULAR; INTRALESIONAL; INTRAMUSCULAR; INTRAVENOUS; SOFT TISSUE
Status: DISCONTINUED | OUTPATIENT
Start: 2025-05-05 | End: 2025-05-05

## 2025-05-05 RX ORDER — FENTANYL CITRATE 50 UG/ML
50 INJECTION, SOLUTION INTRAMUSCULAR; INTRAVENOUS EVERY 5 MIN PRN
Status: DISCONTINUED | OUTPATIENT
Start: 2025-05-05 | End: 2025-05-05

## 2025-05-05 RX ORDER — OXYCODONE HYDROCHLORIDE 5 MG/1
5 TABLET ORAL EVERY 4 HOURS PRN
Status: DISCONTINUED | OUTPATIENT
Start: 2025-05-05 | End: 2025-05-06 | Stop reason: HOSPADM

## 2025-05-05 RX ORDER — ONDANSETRON 2 MG/ML
4 INJECTION INTRAMUSCULAR; INTRAVENOUS EVERY 30 MIN PRN
Status: DISCONTINUED | OUTPATIENT
Start: 2025-05-05 | End: 2025-05-05

## 2025-05-05 RX ORDER — PROPOFOL 10 MG/ML
INJECTION, EMULSION INTRAVENOUS CONTINUOUS PRN
Status: DISCONTINUED | OUTPATIENT
Start: 2025-05-05 | End: 2025-05-05

## 2025-05-05 RX ORDER — HYDROMORPHONE HYDROCHLORIDE 1 MG/ML
0.2 INJECTION, SOLUTION INTRAMUSCULAR; INTRAVENOUS; SUBCUTANEOUS EVERY 5 MIN PRN
Status: DISCONTINUED | OUTPATIENT
Start: 2025-05-05 | End: 2025-05-05

## 2025-05-05 RX ORDER — ONDANSETRON 2 MG/ML
4 INJECTION INTRAMUSCULAR; INTRAVENOUS EVERY 6 HOURS PRN
Status: DISCONTINUED | OUTPATIENT
Start: 2025-05-05 | End: 2025-05-06 | Stop reason: HOSPADM

## 2025-05-05 RX ORDER — HYDROMORPHONE HYDROCHLORIDE 1 MG/ML
0.4 INJECTION, SOLUTION INTRAMUSCULAR; INTRAVENOUS; SUBCUTANEOUS EVERY 5 MIN PRN
Status: DISCONTINUED | OUTPATIENT
Start: 2025-05-05 | End: 2025-05-05

## 2025-05-05 RX ORDER — POLYETHYLENE GLYCOL 3350 17 G/17G
17 POWDER, FOR SOLUTION ORAL DAILY
Status: DISCONTINUED | OUTPATIENT
Start: 2025-05-06 | End: 2025-05-06 | Stop reason: HOSPADM

## 2025-05-05 RX ORDER — ASPIRIN 81 MG/1
81 TABLET ORAL 2 TIMES DAILY
Status: DISCONTINUED | OUTPATIENT
Start: 2025-05-05 | End: 2025-05-06 | Stop reason: HOSPADM

## 2025-05-05 RX ADMIN — SODIUM CHLORIDE, SODIUM LACTATE, POTASSIUM CHLORIDE, AND CALCIUM CHLORIDE: .6; .31; .03; .02 INJECTION, SOLUTION INTRAVENOUS at 14:19

## 2025-05-05 RX ADMIN — PROPOFOL 150 MCG/KG/MIN: 10 INJECTION, EMULSION INTRAVENOUS at 14:40

## 2025-05-05 RX ADMIN — EPHEDRINE SULFATE 5 MG: 5 INJECTION INTRAVENOUS at 15:12

## 2025-05-05 RX ADMIN — EPHEDRINE SULFATE 10 MG: 5 INJECTION INTRAVENOUS at 14:52

## 2025-05-05 RX ADMIN — TRANEXAMIC ACID 1950 MG: 650 TABLET ORAL at 12:11

## 2025-05-05 RX ADMIN — FENTANYL CITRATE 50 MCG: 0.05 INJECTION, SOLUTION INTRAMUSCULAR; INTRAVENOUS at 16:47

## 2025-05-05 RX ADMIN — CEFAZOLIN 1 G: 1 INJECTION, POWDER, FOR SOLUTION INTRAMUSCULAR; INTRAVENOUS at 22:38

## 2025-05-05 RX ADMIN — SODIUM CHLORIDE, SODIUM LACTATE, POTASSIUM CHLORIDE, AND CALCIUM CHLORIDE: .6; .31; .03; .02 INJECTION, SOLUTION INTRAVENOUS at 20:39

## 2025-05-05 RX ADMIN — ONDANSETRON 4 MG: 4 TABLET, ORALLY DISINTEGRATING ORAL at 22:38

## 2025-05-05 RX ADMIN — EPHEDRINE SULFATE 10 MG: 5 INJECTION INTRAVENOUS at 15:01

## 2025-05-05 RX ADMIN — ASPIRIN 81 MG: 81 TABLET ORAL at 20:38

## 2025-05-05 RX ADMIN — Medication 2 G: at 14:27

## 2025-05-05 RX ADMIN — BUPIVACAINE HYDROCHLORIDE IN DEXTROSE 1.3 ML: 7.5 INJECTION, SOLUTION SUBARACHNOID at 14:25

## 2025-05-05 RX ADMIN — OXYCODONE 5 MG: 5 TABLET ORAL at 17:35

## 2025-05-05 RX ADMIN — HYDROMORPHONE HYDROCHLORIDE 0.4 MG: 1 INJECTION, SOLUTION INTRAMUSCULAR; INTRAVENOUS; SUBCUTANEOUS at 20:38

## 2025-05-05 RX ADMIN — MIDAZOLAM 2 MG: 1 INJECTION INTRAMUSCULAR; INTRAVENOUS at 14:26

## 2025-05-05 RX ADMIN — OXYCODONE HYDROCHLORIDE 10 MG: 10 TABLET ORAL at 23:44

## 2025-05-05 RX ADMIN — FENTANYL CITRATE 50 MCG: 0.05 INJECTION, SOLUTION INTRAMUSCULAR; INTRAVENOUS at 16:54

## 2025-05-05 RX ADMIN — PHENYLEPHRINE HYDROCHLORIDE 0.5 MCG/KG/MIN: 10 INJECTION INTRAVENOUS at 15:12

## 2025-05-05 RX ADMIN — HYDROMORPHONE HYDROCHLORIDE 0.4 MG: 1 INJECTION, SOLUTION INTRAMUSCULAR; INTRAVENOUS; SUBCUTANEOUS at 17:04

## 2025-05-05 RX ADMIN — PHENYLEPHRINE HYDROCHLORIDE 100 MCG: 10 INJECTION INTRAVENOUS at 14:54

## 2025-05-05 RX ADMIN — SENNOSIDES AND DOCUSATE SODIUM 1 TABLET: 50; 8.6 TABLET ORAL at 20:39

## 2025-05-05 RX ADMIN — PHENYLEPHRINE HYDROCHLORIDE 100 MCG: 10 INJECTION INTRAVENOUS at 15:40

## 2025-05-05 RX ADMIN — ONDANSETRON 4 MG: 2 INJECTION INTRAMUSCULAR; INTRAVENOUS at 16:02

## 2025-05-05 RX ADMIN — ACETAMINOPHEN 975 MG: 325 TABLET, FILM COATED ORAL at 17:35

## 2025-05-05 RX ADMIN — PROPOFOL 150 MCG/KG/MIN: 10 INJECTION, EMULSION INTRAVENOUS at 15:15

## 2025-05-05 ASSESSMENT — ACTIVITIES OF DAILY LIVING (ADL)
ADLS_ACUITY_SCORE: 52
ADLS_ACUITY_SCORE: 51
ADLS_ACUITY_SCORE: 52

## 2025-05-05 NOTE — ANESTHESIA PREPROCEDURE EVALUATION
Anesthesia Pre-Procedure Evaluation    Patient: Quiana Shepherd   MRN: [id number] : 1985        Procedure : Procedure(s):  REVISION, TOTAL ARTHROPLASTY, HIP, Left          Past Medical History:   Diagnosis Date    Acne vulgaris 10/26/2017    Hip instability, left     Hirsutism     Osteoarthritis of left hip joint due to dysplasia       Past Surgical History:   Procedure Laterality Date    ARTHROPLASTY HIP Left 2025    Procedure: Left total hip arthroplasty;  Surgeon: Jr Mohamud MD;  Location: [facility] OR    BACK SURGERY      Surgery to install halo    ORTHOPEDIC SURGERY      Left hip - TIFFANIE repair      Allergies   Allergen Reactions    Contrast Dye Other (See Comments)     Notes thyroid swelling after a contrast injection for a lumbar LUISA      Social History     Tobacco Use    Smoking status: Never     Passive exposure: Never    Smokeless tobacco: Never   Substance Use Topics    Alcohol use: Yes     Alcohol/week: 9.0 standard drinks of alcohol     Types: 9 Standard drinks or equivalent per week     Comment: 1 beer every 3 weeks      Wt Readings from Last 1 Encounters:   25 76.2 kg (167 lb 15.9 oz)        Anesthesia Evaluation   Pt has had prior anesthetic. Type: General.    History of anesthetic complications  - PONV.      ROS/MED HX  ENT/Pulmonary:  - neg pulmonary ROS     Neurologic:  - neg neurologic ROS     Cardiovascular:  - neg cardiovascular ROS     METS/Exercise Tolerance:     Hematologic:  - neg hematologic  ROS     Musculoskeletal:  - neg musculoskeletal ROS     GI/Hepatic:  - neg GI/hepatic ROS     Renal/Genitourinary:  - neg Renal ROS     Endo:  - neg endo ROS     Psychiatric/Substance Use:  - neg psychiatric ROS     Infectious Disease:  - neg infectious disease ROS     Malignancy:  - neg malignancy ROS     Other:  - neg other ROS          Physical Exam    Airway  airway exam normal      Mallampati: I   TM distance: > 3 FB   Neck ROM: full   Mouth opening: > 3 cm    Respiratory  "Devices and Support         Dental       (+) Modest Abnormalities - crowns, retainers, 1 or 2 missing teeth      Cardiovascular   cardiovascular exam normal          Pulmonary   pulmonary exam normal                OUTSIDE LABS:  CBC:   Lab Results   Component Value Date    WBC 15.1 (H) 04/23/2025    WBC 9.5 04/14/2025    HGB 11.6 (L) 04/23/2025    HGB 11.8 04/14/2025    HCT 35.4 04/23/2025    HCT 36.7 04/14/2025     (H) 04/23/2025     04/14/2025     BMP:   Lab Results   Component Value Date     04/23/2025     04/14/2025    POTASSIUM 4.2 04/23/2025    POTASSIUM 4.5 04/14/2025    CHLORIDE 101 04/23/2025    CHLORIDE 105 04/14/2025    CO2 19 (L) 04/23/2025    CO2 21 (L) 04/14/2025    BUN 11.0 04/23/2025    BUN 10.4 04/14/2025    CR 0.75 04/23/2025    CR 0.61 04/14/2025     (H) 05/05/2025     (H) 04/23/2025     COAGS: No results found for: \"PTT\", \"INR\", \"FIBR\"  POC:   Lab Results   Component Value Date    HCGS Negative 04/23/2025     HEPATIC:   Lab Results   Component Value Date    ALBUMIN 3.5 04/14/2025    PROTTOTAL 6.7 04/14/2025    ALT 37 04/14/2025    AST 34 04/14/2025    ALKPHOS 84 04/14/2025    BILITOTAL 0.3 04/14/2025     OTHER:   Lab Results   Component Value Date    DIANE 10.3 04/23/2025    TSH 0.93 05/30/2024    SED 8 12/23/2022       Anesthesia Plan    ASA Status:  1    NPO Status:  NPO Appropriate    Anesthesia Type: Spinal.   Induction: Intravenous.   Maintenance: TIVA.        Consents    Anesthesia Plan(s) and associated risks, benefits, and realistic alternatives discussed. Questions answered and patient/representative(s) expressed understanding.     - Discussed: Risks, Benefits and Alternatives for BOTH SEDATION and the PROCEDURE were discussed     - Discussed with:  Patient            Postoperative Care    Pain management: Oral pain medications, Multi-modal analgesia.   PONV prophylaxis: Ondansetron (or other 5HT-3), Dexamethasone or Solumedrol     Comments:     " "          Hans Pena MD    Clinically Significant Risk Factors Present on Admission                 # Drug Induced Platelet Defect: home medication list includes an antiplatelet medication             # Overweight: Estimated body mass index is 26.31 kg/m  as calculated from the following:    Height as of this encounter: 1.702 m (5' 7\").    Weight as of this encounter: 76.2 kg (167 lb 15.9 oz).       # Financial/Environmental Concerns:             "

## 2025-05-05 NOTE — OR NURSING
"PACU to Inpatient Nursing Handoff    Patient Quiana Shepherd is a 39 year old female who speaks English.   Procedure Procedure(s):  REVISION, TOTAL ARTHROPLASTY, HIP   Surgeon(s) Primary: Jr Mohamud MD  Assisting: Vianey Lauren PA-C  Resident - Assisting: Mary Ulloa MD     Allergies   Allergen Reactions    Contrast Dye Other (See Comments)     Notes thyroid swelling after a contrast injection for a lumbar LUISA       Isolation  [unfilled]     Past Medical History   has a past medical history of Acne vulgaris (10/26/2017), Hip instability, left, Hirsutism, and Osteoarthritis of left hip joint due to dysplasia.    Anesthesia Choice   Dermatome Level     Preop Meds Txa 1950 MG - time given: 1211   Nerve block Not applicable   Intraop Meds ondansetron (Zofran): last given at 1602  Ephedrine and Julius   Local Meds Ortho cocktail   Antibiotics cefazolin (Ancef) - last given at 1427     Pain Patient Currently in Pain: yes   PACU meds  acetaminophen (Tylenol): 975 mg (total dose) last given at 1735   fentanyl (Sublimaze): 100 mcg (total dose) last given at 1656   hydromorphone (Dilaudid): .4 mg (total dose) last given at 1706   oxycodone (Roxicodone): 5 mg (total dose) last given at 1735    PCA / epidural No   Capnography     Telemetry ECG Rhythm: Sinus rhythm   Inpatient Telemetry Monitor Ordered? No        Labs Glucose Lab Results   Component Value Date     05/05/2025       Hgb Lab Results   Component Value Date    HGB 11.6 04/23/2025       INR No results found for: \"INR\"   PACU Imaging Completed     Wound/Incision Incision/Surgical Site 05/05/25 Left Hip (Active)   Incision Assessment UTV 05/05/25 1700   Dressing Alginate;Transparent film (Opsite, Tegaderm) 05/05/25 1627   Closure Adhesive strip(s);Approximated;Liquid bandage;Sutures 05/05/25 1627   Incision Care Ice applied 05/05/25 1700   Dressing Intervention Clean, dry, intact 05/05/25 1700   Number of days: 0      CMS        Equipment ice pack, " adductor pillow   Other LDA       IV Access Peripheral IV 05/05/25 Right;Posterior Hand (Active)   Site Assessment WDL 05/05/25 1640   Line Status Infusing 05/05/25 1640   Dressing Transparent 05/05/25 [facility]   Dressing Status clean;intact;dry 05/05/25 1640   Line Intervention Flushed 05/05/25 1640   Line Necessity Yes, meets criteria 05/05/25 [facility]   Phlebitis Scale 0-->no symptoms 05/05/25 1640   Infiltration? no 05/05/25 [facility]   Number of days: 0      Blood Products Not applicable  mL   Intake/Output Date 05/05/25 0700 - 05/06/25 0659   Shift 9601-7704 5859-7681 7888-0899 24 Hour Total   INTAKE   I.V.  650  650   Shift Total(mL/kg)  650(8.53)  650(8.53)   OUTPUT   Blood  150  150   Shift Total(mL/kg)  150(1.97)  150(1.97)   Weight (kg) 76.2 76.2 76.2 76.2      Drains / Fox     Time of void PreOp Time of Void Prior to Procedure: 0900 (05/05/25 1157)    PostOp Urine Occurrence: 1 (05/05/25 1325)    Diapered? No-bladder scanned 373 ml   Bladder Scan     PO    crackers and water     Vitals    B/P: 117/74  T: 97.7  F (36.5  C)    Temp src: Axillary  P:  Pulse: 76 (05/05/25 1700)          R: 16  O2:  SpO2: 100 %    O2 Device: None (Room air) (05/05/25 1139)    Oxygen Delivery: 2 LPM (05/05/25 1647)         Family/support present significant other, Isacc   Patient belongings     Patient transported on bed   DC meds/scripts (obs/outpt) no   Inpatient Pain Meds Released? Yes       Special needs/considerations None   Tasks needing completion None       Karina Sapp, RN  Vocjennifer      no

## 2025-05-05 NOTE — BRIEF OP NOTE
Orthopaedic Surgery Brief Op-Note      Patient: Quiana Shepherd  : 1985  Date of Service: 2025 4:42 PM    Pre-operative Diagnosis: Hip instability, left [M25.352]  Post-operative Diagnosis: same    Procedure(s) Performed: Procedure(s):  REVISION, TOTAL ARTHROPLASTY, HIP    Staff: Dr. Mohamud  Assistants:   Vianey Lauren, CAROLYNE Ulloa MD    Anesthesia: Spinal  EBL: 150 cc    Implants:   Implant Name Type Inv. Item Serial No.  Lot No. LRB No. Used Action   IMP LINER S&N ACET R3 XLPE [id number] [id number] - [id number] Total Joint Component/Insert IMP LINER S&N ACET R3 XLPE [id number] 0DEG [id number]  [employer] [id number] Left 1 Implanted   IMP HEAD FEMORAL SNR OXINIUM 12/14 32MM +8 [id number] - [id number] Total Joint Component/Insert IMP HEAD FEMORAL SNR OXINIUM 12/14 32MM +8 [id number]  [employer] [id number] Left 1 Implanted     Drains: none  Intra-op Labs/Cxs/Specimens: None  Complications: No apparent complications during procedure  Findings: Please see dictated operative note for details    Disposition: Stable to PACU, then admit to Orthopaedics    POST OPERATIVE PLAN    Assessment/Plan: Quiana Shepherd is a 39 year old female s/p Procedure(s):  REVISION, TOTAL ARTHROPLASTY, HIP on 2025 with Dr. Mohamud.    Activity: Up with assist and assistive devices as needed until independent. Posterior hip precautions to operative side x 3 months:  1) No hip flexion beyond 90 degrees  2) No adduction beyond midline  3) No internal rotation beyond neutral   Weight bearing status: WBAT  Antibiotics: Cefazolin x 24 hours  Diet: Begin with clear fluids and progress diet as tolerated. Bowel regimen. Anti-emetics PRN.    DVT prophylaxis:  mg x 4 weeks beginning POD 1   Elevation: Elevate heels off of bed on pillows   Wound Care: Tegaderm and alginate x 2 weeks   Drains: none  Pain management: Orals PRN, IV for breakthrough only  X-rays: AP Pelvis and Lateral operative hip in PACU.    Physical Therapy: Mobilization, ROM, ADL's, Posterior hip precautions.    Occupational Therapy: ADL's  Labs: Trend Hgb on POD #1, 2  Consults: PT, OT. Hospitalist, appreciate assistance in caring for this patient throughout the perioperative period    Future Appointments   Date Time Provider Department Center   5/6/2025  9:00 AM Charity Ann, SVITLANA [facility]   5/6/2025  9:45 AM Radha Phoenix OT [facility]   5/22/2025 10:20 AM Jr Mohamud MD [facility]       Disposition: Pending progress with therapies, pain control on orals, and medical stability, anticipate discharge to Home #1.    I assisted with positioning, prepping and draping, and closure.      Vianey Lauren PA-C 5/5/2025 4:42 PM  Orthopedic Surgery

## 2025-05-05 NOTE — ANESTHESIA CARE TRANSFER NOTE
Patient: Quiana Shepherd    Procedure: Procedure(s):  REVISION, TOTAL ARTHROPLASTY, HIP       Diagnosis: Hip instability, left [M25.352]  Diagnosis Additional Information: No value filed.    Anesthesia Type:   Spinal     Note:    Oropharynx: oropharynx clear of all foreign objects  Level of Consciousness: drowsy  Oxygen Supplementation: room air    Independent Airway: airway patency satisfactory and stable  Dentition: dentition unchanged  Vital Signs Stable: post-procedure vital signs reviewed and stable  Report to RN Given: handoff report given  Patient transferred to: PACU    Handoff Report: Identifed the Patient, Identified the Reponsible Provider, Reviewed the pertinent medical history, Discussed the surgical course, Reviewed Intra-OP anesthesia mangement and issues during anesthesia, Set expectations for post-procedure period and Allowed opportunity for questions and acknowledgement of understanding      Vitals:  Vitals Value Taken Time   /122 05/05/25 1645   Temp     Pulse 88 05/05/25 1646   Resp 29 05/05/25 1646   SpO2     Vitals shown include unfiled device data.    Electronically Signed By: VANESA Copeland CRNA  May 5, 2025  4:47 PM

## 2025-05-05 NOTE — ANESTHESIA POSTPROCEDURE EVALUATION
Patient: Quiana Shepherd    Procedure: Procedure(s):  REVISION, TOTAL ARTHROPLASTY, HIP       Anesthesia Type:  Spinal    Note:  Disposition: Inpatient   Postop Pain Control: Uneventful            Sign Out: Well controlled pain   PONV: No   Neuro/Psych: Uneventful            Sign Out: Acceptable/Baseline neuro status   Airway/Respiratory: Uneventful            Sign Out: Acceptable/Baseline resp. status   CV/Hemodynamics: Uneventful            Sign Out: Acceptable CV status; No obvious hypovolemia; No obvious fluid overload   Other NRE:    DID A NON-ROUTINE EVENT OCCUR?        Last vitals:  Vitals Value Taken Time   /84 05/05/25 1730   Temp 36.5  C (97.7  F) 05/05/25 1640   Pulse 78 05/05/25 1736   Resp 19 05/05/25 1736   SpO2 100 % 05/05/25 1734   Vitals shown include unfiled device data.    Electronically Signed By: Hans Pena MD  May 5, 2025  5:36 PM

## 2025-05-05 NOTE — ANESTHESIA PROCEDURE NOTES
"Intrathecal injection Procedure Note    Pre-Procedure   Staff -        Anesthesiologist:  Vickey Roque MD       Performed By: anesthesiologist       Location: OR       Procedure Start/Stop Times: 5/5/2025 2:25 PM       Pre-Anesthestic Checklist: patient identified, IV checked, risks and benefits discussed, informed consent, monitors and equipment checked, pre-op evaluation, at physician/surgeon's request and post-op pain management  Timeout:       Correct Patient: Yes        Correct Procedure: Yes        Correct Site: Yes        Correct Position: Yes   Procedure Documentation  Procedure: intrathecal injection         Diagnosis: Hip revision       Patient Position: sitting       Skin prep: Chloraprep       Insertion Site: L2-3. (midline approach).       Needle Gauge: 25.        Needle Length (Inches): 3.5        Spinal Needle Type: Sprotte       Introducer used       Introducer: 20 G       # of attempts: 1 and  # of redirects:  0    Assessment/Narrative         Paresthesias: No.       CSF fluid: clear.       Opening pressure was cmH2O while  Sitting.      Medication(s) Administered   0.75% Hyperbaric Bupivacaine (Intrathecal) - Intrathecal   1.3 mL - 5/5/2025 2:25:00 PM  Medication Administration Time: 5/5/2025 2:25 PM      FOR [facility] ([facility]) ONLY:   Pain Team Contact information: please page the Pain Team Via [employer]. Search \"Pain\". During daytime hours, please page the attending first. At night please page the resident first.      "

## 2025-05-06 ENCOUNTER — APPOINTMENT (OUTPATIENT)
Dept: PHYSICAL THERAPY | Facility: CLINIC | Age: 40
End: 2025-05-06
Payer: COMMERCIAL

## 2025-05-06 VITALS
DIASTOLIC BLOOD PRESSURE: 83 MMHG | OXYGEN SATURATION: 98 % | WEIGHT: 167.99 LBS | HEIGHT: 67 IN | TEMPERATURE: 98 F | BODY MASS INDEX: 26.37 KG/M2 | HEART RATE: 71 BPM | RESPIRATION RATE: 16 BRPM | SYSTOLIC BLOOD PRESSURE: 117 MMHG

## 2025-05-06 LAB
GLUCOSE BLDC GLUCOMTR-MCNC: 106 MG/DL (ref 70–99)
HGB BLD-MCNC: 9.9 G/DL (ref 11.7–15.7)
HOLD SPECIMEN: NORMAL

## 2025-05-06 PROCEDURE — 99214 OFFICE O/P EST MOD 30 MIN: CPT | Performed by: INTERNAL MEDICINE

## 2025-05-06 PROCEDURE — 82962 GLUCOSE BLOOD TEST: CPT

## 2025-05-06 PROCEDURE — 97530 THERAPEUTIC ACTIVITIES: CPT | Mod: GP

## 2025-05-06 PROCEDURE — 250N000011 HC RX IP 250 OP 636

## 2025-05-06 PROCEDURE — 97161 PT EVAL LOW COMPLEX 20 MIN: CPT | Mod: GP

## 2025-05-06 PROCEDURE — 250N000013 HC RX MED GY IP 250 OP 250 PS 637

## 2025-05-06 PROCEDURE — 97116 GAIT TRAINING THERAPY: CPT | Mod: GP

## 2025-05-06 PROCEDURE — 85018 HEMOGLOBIN: CPT

## 2025-05-06 PROCEDURE — 999N000111 HC STATISTIC OT IP EVAL DEFER

## 2025-05-06 PROCEDURE — 36415 COLL VENOUS BLD VENIPUNCTURE: CPT

## 2025-05-06 RX ORDER — AMOXICILLIN 250 MG
1 CAPSULE ORAL 2 TIMES DAILY
Qty: 60 TABLET | Refills: 0 | Status: SHIPPED | OUTPATIENT
Start: 2025-05-06

## 2025-05-06 RX ORDER — POLYETHYLENE GLYCOL 3350 17 G/17G
17 POWDER, FOR SOLUTION ORAL DAILY
Qty: 510 G | Refills: 0 | Status: SHIPPED | OUTPATIENT
Start: 2025-05-06

## 2025-05-06 RX ORDER — CEPHALEXIN 500 MG/1
500 CAPSULE ORAL 2 TIMES DAILY
Qty: 20 CAPSULE | Refills: 0 | Status: SHIPPED | OUTPATIENT
Start: 2025-05-06 | End: 2025-05-16

## 2025-05-06 RX ORDER — OXYCODONE HYDROCHLORIDE 5 MG/1
5-10 TABLET ORAL EVERY 4 HOURS PRN
Qty: 26 TABLET | Refills: 0 | Status: SHIPPED | OUTPATIENT
Start: 2025-05-06

## 2025-05-06 RX ORDER — ONDANSETRON 4 MG/1
4 TABLET, ORALLY DISINTEGRATING ORAL EVERY 6 HOURS PRN
Qty: 10 TABLET | Refills: 0 | Status: SHIPPED | OUTPATIENT
Start: 2025-05-06

## 2025-05-06 RX ORDER — ACETAMINOPHEN 325 MG/1
650 TABLET ORAL EVERY 4 HOURS PRN
Qty: 100 TABLET | Refills: 0 | Status: SHIPPED | OUTPATIENT
Start: 2025-05-06

## 2025-05-06 RX ORDER — ASPIRIN 81 MG/1
81 TABLET, COATED ORAL 2 TIMES DAILY
Qty: 60 TABLET | Refills: 0 | Status: SHIPPED | OUTPATIENT
Start: 2025-05-06

## 2025-05-06 RX ADMIN — ACETAMINOPHEN 975 MG: 325 TABLET, FILM COATED ORAL at 10:26

## 2025-05-06 RX ADMIN — OXYCODONE 5 MG: 5 TABLET ORAL at 06:22

## 2025-05-06 RX ADMIN — OXYCODONE 5 MG: 5 TABLET ORAL at 14:59

## 2025-05-06 RX ADMIN — POLYETHYLENE GLYCOL 3350 17 G: 17 POWDER, FOR SOLUTION ORAL at 08:26

## 2025-05-06 RX ADMIN — ONDANSETRON 4 MG: 4 TABLET, ORALLY DISINTEGRATING ORAL at 10:31

## 2025-05-06 RX ADMIN — CEFAZOLIN 1 G: 1 INJECTION, POWDER, FOR SOLUTION INTRAMUSCULAR; INTRAVENOUS at 06:14

## 2025-05-06 RX ADMIN — SENNOSIDES AND DOCUSATE SODIUM 1 TABLET: 50; 8.6 TABLET ORAL at 08:26

## 2025-05-06 RX ADMIN — OXYCODONE HYDROCHLORIDE 10 MG: 10 TABLET ORAL at 10:26

## 2025-05-06 RX ADMIN — ACETAMINOPHEN 975 MG: 325 TABLET, FILM COATED ORAL at 04:18

## 2025-05-06 RX ADMIN — ASPIRIN 81 MG: 81 TABLET ORAL at 08:26

## 2025-05-06 ASSESSMENT — ACTIVITIES OF DAILY LIVING (ADL)
ADLS_ACUITY_SCORE: 53
ADLS_ACUITY_SCORE: 52
ADLS_ACUITY_SCORE: 53

## 2025-05-06 NOTE — PROGRESS NOTES
"Orthopedic Surgery Progress Note 05/06/2025    S: Patient seen at bedside. No acute events overnight. Pain controlled on PO. Tolerating PO, voiding. Has not yet worked with therapy. Denies new fevers, chills, CP, SOB. Endorses numbness about medial left thigh that has been present since surgery.    O:  Temp: 97.8  F (36.6  C) Temp src: Oral BP: 109/77 Pulse: 76   Resp: 16 SpO2: 99 % O2 Device: None (Room air) Oxygen Delivery: 1 LPM    Exam:  Gen: alert and oriented, responds to questions appropriately  Resp: non-labored on RA  MSK:  LLE:  - Dressings c/d/i  - SILT femoral/tibial/sural/saphenous/DP/SP nerves  - Fires quad, TA, EHL, FHL, GaSC  - DP pulses 2+, foot wwp    Drain output: n/a.    No lab results found in last 7 days.    Invalid input(s): \"SEDRATE\"    Culture results: n/a    Assessment: Quiana Shepherd is a 39 year old female s/p Procedure(s):  REVISION, TOTAL ARTHROPLASTY, HIP on 5/5/2025 with Dr. Mohamud.    Today:   - Pain control on PO  - Mobilize with therapy  - Anticipate discharge home today     Activity: Up with assist and assistive devices as needed until independent. Posterior hip precautions to operative side x 3 months:  1) No hip flexion beyond 90 degrees  2) No adduction beyond midline  3) No internal rotation beyond neutral   Weight bearing status: WBAT  Antibiotics: Cefazolin x 24 hours  Diet: Begin with clear fluids and progress diet as tolerated. Bowel regimen. Anti-emetics PRN.    DVT prophylaxis:  mg x 4 weeks beginning POD 1   Elevation: Elevate heels off of bed on pillows   Wound Care: Tegaderm and alginate x 2 weeks   Drains: none  Pain management: Orals PRN, IV for breakthrough only  X-rays: AP Pelvis and Lateral operative hip in PACU.   Physical Therapy: Mobilization, ROM, ADL's, Posterior hip precautions.    Occupational Therapy: ADL's  Labs: Trend Hgb on POD #1, 2  Consults: PT, OT. Hospitalist, appreciate assistance in caring for this patient throughout the perioperative " period            Future Appointments   Date Time Provider Department Center   5/6/2025  9:00 AM Charity Ann, PT [facility] [place]   5/6/2025  9:45 AM Radha Phoenix, OT [facility] [place]   5/22/2025 10:20 AM Jr Mohamud MD [facility]         Disposition: Pending progress with therapies, pain control on orals, and medical stability, anticipate discharge to Home #1.    Orthopedic surgery staff for this patient is Dr. Mohamud. Discussed.    --  Mary Ulloa MD  Orthopedic Surgery PGY-4

## 2025-05-06 NOTE — PLAN OF CARE
Occupational Therapy: Orders received. Chart reviewed and discussed with care team.? Occupational Therapy not indicated due to pt with previous hip surgery and familiar with post op precautions. Pt has all need AE, able to complete toilet and dressing ind. Pt reports planning to hold off on showering for now, will work with  OT when she is ready.? Defer discharge recommendations to ortho.? Will complete orders.

## 2025-05-06 NOTE — CARE PLAN
"  0645 Admission Note    Reason for admission: L total hip arthroplasty  Primary team notified of pt arrival.  Admitted from: PACU  Via: stretcher  Accompanied by: spouse  Belongings: Placed in closet; valuables sent home with family  Admission Required Doc Completed: No  passed on to the oncoming nurse  Mobility Devices Utilized by Patient Provided (i.e. walker, wheelchair, etc.): Yes  Teaching: Orientation to unit and call light- call light within reach, use of console, meal times, when to call for the RN, and enforced importance of safety.  IV Access: R PIV   Telemetry: Yes/No  Ht./Wt.: Completed  Code Status verified on armband: Yes  2 RN Skin Assessment Completed with: no ,passed on the oncoming nurse  Suction/Ambu bag/Flowmeter at bedside: Yes    Pt status: A&O X4,Able to make needs known. Rates pain at 4/10 upon admission, cold pack to the L hip surgical site. Denied SOB ,CP,N/V. Reported N/T to the feet, states getting better. Pt requested for a meal. Turkey lunch box sandwich was given with drinks. Spouse by the bedside. Call light within reach. Continue with POC.     /73 (BP Location: Left arm)   Pulse 77   Temp 97.9  F (36.6  C) (Oral)   Resp 18   Ht 1.702 m (5' 7\")   Wt 76.2 kg (167 lb 15.9 oz)   LMP  (LMP Unknown)   SpO2 98%   BMI 26.31 kg/m      Yamilka Seymour RN    "

## 2025-05-06 NOTE — PLAN OF CARE
VS: VSS, pt. Denied CP or SOB.   O2: Room air sat. > 90%.   Output: Voiding adequate amount without difficulty.   Last BM: 05/05/25   Activity: Up with walker and assist of one.    Skin: Intact except surgical incision.    Pain: Manageable with PRN medication.    Neuro: CMS and Neuro intact to baseline.    Dressing: CDI.    Diet: Regular tolerating.    LDA: Dcd.    Equipment: Walker, IV pole and personal belongings.    Plan: Discharge home today.    Additional Info:      DISCHARGE SUMMARY    Pt discharging to: home.  Transportation: family.   AVS given and discussed: Pt was given AVS and pt states understanding of content. Pt has no further questions.   Stoplight Tool given and discussed: Yes, no further questions.  Medications given: Yes, discussed. No further questions.   Belongings returned: Yes, ensured all belongings packed and sent with pt.   Comments: pt understand discharge plan.

## 2025-05-06 NOTE — PROGRESS NOTES
[facility] - Internal Medicine Daily Note   Date of Service: 5/6/2025  Patient: Quiana Shepherd  MRN: [id number]  Admission Date: 5/5/2025  Hospital Day # 0    Assessment & Plan    Quiana Shepherd is a 39 year old female admitted on 5/5/2025. She has a history of left hip arthroplasty on 4/9/2025 complicated by left hip dislocation on 2 different occurrences in both instances, requiring reduction in the ER.  Patient presented in today for revision of left RADHA.     # Present history of hip arthroplasty complicated by postoperative hip dislocation  # S/p revision left hip RADHA  -post operative hip precautions for three months noted    mg po daily for DVT and PE ppx for 4 weeks starting POD 1 noted   -PT, OT consultation in the morning  -Multimodal pain control.  # acute blood loss anemia due to expected perioperative blood loss, hb 9.9 post op     # Chronic acne-can resume PTA spironolactone following discharge       Ras Crisostomo MD  Internal Medicine Staff Hospitalist   [facility]   Pager: [phone]    Team: Jennifer Echavarria 18  Page Cross Cover after 5 pm: pager [phone]   ___________________________________________________________________    Subjective & Interval Hx:    No acute events  Vital signs stable  Plan for discharge home today noted      Last 24 hr care team notes reviewed.   ROS:  4 point ROS including Respiratory, CV, GI and , other than that noted in the HPI, is negative.    Medications: Reviewed in EPIC. List below for reference    Current Facility-Administered Medications:     acetaminophen (TYLENOL) tablet 975 mg, 975 mg, Oral, Q8H, Vianey Lauren PA-WONG, 975 mg at 05/06/25 1026    aspirin EC tablet 81 mg, 81 mg, Oral, BID, iVaney Lauren PA-C, 81 mg at 05/06/25 0826    benzocaine-menthol (CHLORASEPTIC MAX) 15-10 MG lozenge 1 lozenge, 1 lozenge, Buccal, Q1H PRN, Vianey Lauren PA-C    bisacodyl (DULCOLAX) suppository 10 mg, 10 mg, Rectal, Daily PRN, Pfarr,  Vianey DUFF PA-C    HYDROmorphone (PF) (DILAUDID) injection 0.2 mg, 0.2 mg, Intravenous, Q2H PRN **OR** HYDROmorphone (PF) (DILAUDID) injection 0.4 mg, 0.4 mg, Intravenous, Q2H PRN, Leonidas, Vianey DUFF PA-C, 0.4 mg at 05/05/25 2038    lactated ringers infusion, , Intravenous, Continuous, Vianey Lauren PA-C, Last Rate: 10 mL/hr at 05/05/25 2039, New Bag at 05/05/25 2039    lidocaine (LMX4) cream, , Topical, Q1H PRN, Vianey Lauren PA-C    lidocaine 1 % 0.1-1 mL, 0.1-1 mL, Other, Q1H PRN, Vianey Lauren PA-C    magnesium hydroxide (MILK OF MAGNESIA) suspension 30 mL, 30 mL, Oral, Daily PRN, Vianey Lauren PA-C    naloxone (NARCAN) injection 0.2 mg, 0.2 mg, Intravenous, Q2 Min PRN **OR** naloxone (NARCAN) injection 0.4 mg, 0.4 mg, Intravenous, Q2 Min PRN **OR** naloxone (NARCAN) injection 0.2 mg, 0.2 mg, Intramuscular, Q2 Min PRN **OR** naloxone (NARCAN) injection 0.4 mg, 0.4 mg, Intramuscular, Q2 Min PRN, Jr Mohamud MD    ondansetron (ZOFRAN ODT) ODT tab 4 mg, 4 mg, Oral, Q6H PRN, 4 mg at 05/06/25 1031 **OR** ondansetron (ZOFRAN) injection 4 mg, 4 mg, Intravenous, Q6H PRN, Vianey Lauren PA-C    oxyCODONE (ROXICODONE) tablet 5 mg, 5 mg, Oral, Q4H PRN, 5 mg at 05/06/25 0622 **OR** oxyCODONE IR (ROXICODONE) tablet 10 mg, 10 mg, Oral, Q4H PRN, Vianey Lauren PA-C, 10 mg at 05/06/25 1026    polyethylene glycol (MIRALAX) Packet 17 g, 17 g, Oral, Daily, Vianey Lauren PA-C, 17 g at 05/06/25 0826    prochlorperazine (COMPAZINE) injection 10 mg, 10 mg, Intravenous, Q6H PRN **OR** prochlorperazine (COMPAZINE) tablet 10 mg, 10 mg, Oral, Q6H PRN, Vianey Lauren PA-C    senna-docusate (SENOKOT-S/PERICOLACE) 8.6-50 MG per tablet 1 tablet, 1 tablet, Oral, BID, Vianey Lauren, PA-C, 1 tablet at 05/06/25 0826    sodium chloride (PF) 0.9% PF flush 3 mL, 3 mL, Intracatheter, Q8H, Vianey Lauren PA-C, 3 mL at 05/05/25 2040    sodium chloride (PF) 0.9% PF flush 3 mL, 3 mL, Intracatheter, q1 min prn, Vianey Lauren  "L, PA-C    Physical Exam:    /83 (BP Location: Left arm)   Pulse 71   Temp 98  F (36.7  C) (Oral)   Resp 16   Ht 1.702 m (5' 7\")   Wt 76.2 kg (167 lb 15.9 oz)   LMP  (LMP Unknown)   SpO2 98%   BMI 26.31 kg/m       GENERAL: Alert and oriented x 3. NAD.   HEENT: Anicteric sclera. Mucous membranes moist.   CV: RRR. S1, S2. No murmurs appreciated.   RESPIRATORY: Effort normal on RA. Lungs CTAB with no wheezing, rales, rhonchi.   GI: Abdomen soft and non distended with normoactive bowel sounds present in all quadrants. No tenderness, rebound, guarding.   NEUROLOGICAL: No focal deficits. Moves all extremities.    EXTREMITIES: No peripheral edema. Intact bilateral pedal pulses.   SKIN: No jaundice. No rashes.     Labs & Studies of Note: I personally reviewed the following studies:  CBC RESULTS:   Recent Labs   Lab Test 05/06/25  0653 04/23/25  1346   WBC  --  15.1*   RBC  --  3.74*   HGB 9.9* 11.6*   HCT  --  35.4   MCV  --  95   MCH  --  31.0   MCHC  --  32.8   RDW  --  12.9   PLT  --  692*   Last Comprehensive Metabolic Panel:  Lab Results   Component Value Date     04/23/2025    POTASSIUM 4.2 04/23/2025    CHLORIDE 101 04/23/2025    CO2 19 (L) 04/23/2025    ANIONGAP 16 (H) 04/23/2025     (H) 05/06/2025    BUN 11.0 04/23/2025    CR 0.75 04/23/2025    GFRESTIMATED >90 04/23/2025    DIANE 10.3 04/23/2025         "

## 2025-05-06 NOTE — PROGRESS NOTES
[facility]                                                                                   OUTPATIENT PHYSICAL THERAPY    PLAN OF TREATMENT FOR OUTPATIENT REHABILITATION   Patient's Last Name, First Name, Quiana Akhtar YOB: 1985   Provider's Name   [facility]   Medical Record No.  [id number]     Onset Date: 05/05/25 Start of Care Date:  5/6/25     Medical Diagnosis:                  PT Diagnosis:  impaired functional mobility Certification Dates:  From:  5/6/25  To:  5/6/25       See note for plan of treatment, functional goals, and certification details.    I CERTIFY THE NEED FOR THESE SERVICES FURNISHED UNDER        THIS PLAN OF TREATMENT AND WHILE UNDER MY CARE (Physician co-signature of this document indicates review and certification of the therapy plan).              05/06/25 0800   Appointment Info   Signing Clinician's Name / Credentials (PT) ANGIE Truong   Student Supervision On-site supervision provided;Direct supervision provided;Line of sight supervision provided;Direct Patient Contact Provided;Therapy services provided with the co-signing licensed therapist guiding and directing the services, and providing the skilled judgement and assessment throughout the session   Rehab Comments (PT) posterior hip precautions   Living Environment   People in Home significant other   Current Living Arrangements house   Home Accessibility stairs to enter home;stairs within home   Number of Stairs, Main Entrance 3   Stair Railings, Main Entrance railing on left side (ascending)   Number of Stairs, Within Home, Primary greater than 10 stairs   Stair Railings, Within Home, Primary railing on right side (ascending)   Transportation Anticipated family or friend will provide   Living Environment Comments partner is living in home full time since surgery (had not lived together previously) and available to assist   Self-Care   Usual  Activity Tolerance moderate   Current Activity Tolerance fair   Equipment Currently Used at Home tub bench;walker, rolling;cane, straight;raised toilet seat;other (see comments)  (grabber)   Fall history within last six months no   Activity/Exercise/Self-Care Comment been using no rinse shower cloth and shower caps, used walker in tub and partner assist w/ detachable shower head. partner sometimes assists w/ dressing otherwise uses grabber for dressing.   General Information   Onset of Illness/Injury or Date of Surgery 05/05/25   Referring Physician Vianey Lauren PA-C   Patient/Family Therapy Goals Statement (PT) avoid dislocation, increase confidence w/ van transfer, getting out of bed and use of toilet   Pertinent History of Current Problem (include personal factors and/or comorbidities that impact the POC) Quiana Shepherd is a 39 year old female admitted on 5/5/2025. She has a history of left hip arthroplasty on 4/9/2025 complicated by left hip dislocation on 2 different occurrences in both instances, requiring reduction in the ER.  Patient presented in today for revision of left RADHA   Existing Precautions/Restrictions fall;weight bearing;no hip IR;no hip ADD past midline;90 degree hip flexion   Weight-Bearing Status - LUE full weight-bearing   Weight-Bearing Status - RUE full weight-bearing   Weight-Bearing Status - LLE weight-bearing as tolerated   Weight-Bearing Status - RLE full weight-bearing   General Observations pt supine in bed   Cognition   Affect/Mental Status (Cognition) WNL   Pain Assessment   Patient Currently in Pain Yes, see Vital Sign flowsheet  (5/10 at rest)   Integumentary/Edema   Integumentary/Edema Comments post op incision RADHA   Posture    Posture Forward head position;Protracted shoulders   Range of Motion (ROM)   Range of Motion ROM deficits secondary to surgical procedure   Strength (Manual Muscle Testing)   Strength (Manual Muscle Testing) Deficits observed during functional mobility    Strength Comments secondary to surgical procedure   Bed Mobility   Comment, (Bed Mobility) SBA supine to sit  (pt also prefers use of abduction brace w/ mob)   Transfers   Comment, (Transfers) S sit to/from stand  (pt also prefers use of abduction brace w/ mob)   Gait/Stairs (Locomotion)   Comment, (Gait/Stairs) S w/ FWW  (pt also prefers use of abduction brace w/ mob)   Sensory Examination   Sensory Perception Comments numbness from L inner thigh to L knee, still having L knee and calf numbness (persisting from initial RADHA)   Coordination   Coordination Comments not formally assessed   Muscle Tone   Muscle Tone Comments not formally assessed   Clinical Impression   Criteria for Skilled Therapeutic Intervention Yes, treatment indicated   PT Diagnosis (PT) impaired functional mobility   Influenced by the following impairments pain, weakness, L RADHA   Functional limitations due to impairments bed mob, transfers, amb, stairs   Clinical Presentation (PT Evaluation Complexity) stable   Clinical Presentation Rationale per clincal judgement   Clinical Decision Making (Complexity) low complexity   Planned Therapy Interventions (PT) bed mobility training;gait training;home exercise program;patient/family education;ROM (range of motion);strengthening;stair training;transfer training;progressive activity/exercise;risk factor education;home program guidelines   Risk & Benefits of therapy have been explained evaluation/treatment results reviewed;care plan/treatment goals reviewed;risks/benefits reviewed;current/potential barriers reviewed;participants voiced agreement with care plan;participants included;patient   Clinical Impression Comments pt requiring S-CGA for functional mobility. pt will have support of partner at home, as he has temporarily moved in for duration of recover and able to assist as needed. pt would benefit from ongoing therapies but would likely require an excessive effort to leave the home at this time.    PT Total Evaluation Time   PT Eval, Low Complexity Minutes (89804) 10   Physical Therapy Goals   PT Frequency One time eval and treatment only   PT Predicted Duration/Target Date for Goal Attainment 05/06/25   PT Goals Bed Mobility;Transfers;Gait;Stairs   PT: Bed Mobility Modified independent;Supervision/stand-by assist;Supine to/from sit;Within precautions;Goal Met   PT: Transfers Modified independent;Supervision/stand-by assist;Sit to/from stand;Within precautions;Assistive device;Goal Met   PT: Gait Supervision/stand-by assist;Modified independent;Within precautions;Assistive device;Greater than 200 feet   PT: Stairs Modified independent;Supervision/stand-by assist;3 stairs;Rail on left;Goal Met   Interventions   Interventions Quick Adds Gait Training;Therapeutic Activity   Therapeutic Activity   Therapeutic Activities: dynamic activities to improve functional performance Minutes (85091) 25   Symptoms Noted During/After Treatment Increased pain;Fatigue;Dizziness   Treatment Detail/Skilled Intervention pt supine in bed and agreeable to PT upon arrival. time taken for room set up. pt prefers use of abduction brace (although not required by ortho) and able to don IND. pt completed supine to sit transfer w/ SBA and VCs for transfer using UEs on LLE and heel/toe shifting to get legs EOB. pt demonstrated understanding. pt completed STS w/ SBA to FWW. pt required multiple seated rest breaks during gait training. sit to/from stand rest breaks completed w/ FWW and S. STS completed upon completion of gait training w/ FWW and S. pt reporting onset of dizziness at this point, rating it a 5/10. pt required extensive seated rest break in order to decrease symptoms experienced. PT gave cool washcloth for pt w/ intent to decrease symptoms. once symptoms decreased, final STS to return to room completed w/ CGA and FWW (inc assist d/t dizziness). following additional short bout of amb, pt transferred to chair w/ FWW and CGA. pt  transferred to room in chair d/t symptoms. BP taken upon return to room, BP reading 114/78. pt reclined in chair w/ breakfast and all needs met upon PT exit. nursing notified of dizziness symptoms.   Gait Training   Gait Training Minutes (99636) 32   Symptoms Noted During/After Treatment (Gait Training) fatigue;increased pain;dizziness   Treatment Detail/Skilled Intervention pt amb 70 ft w/ FWW and S to PT gym. pt required seated rest break upon arrival. pt ascended/descended 4 steps w/ use of L rail and SPC w/ SBA. pt demonstrated step to pattern and preferred to descend backwards down the steps. pt required seated rest break upon completion. during rest break, PT set up simulated stairs to get into/out of van (pt's transport to/from hospital). pt completed 2 steps to simulate van environment w/ SPC on first step, simulated L on first step (simulating door of van), and simulated R rail assist on second step. SBA-CGA given throughout. pt completed successfully, demonstrating van set up. pt completed w/ step to pattern and descended backwards. pt took seated rest break following. pt amb 10ft to recliner for transport back to room.   Distance in Feet 80   PT Discharge Planning   PT Plan d/c PT   PT Discharge Recommendation (DC Rec)   (defer to ortho)   PT Rationale for DC Rec pt requiring S-CGA for functional mobility. pt will have support of partner at home, as he has temporarily moved in for duration of recover and able to assist as needed. pt would benefit from ongoing therapies but would likely require an excessive effort to leave the home at this time.   PT Brief overview of current status SBA bed mob (for VCs), S-CGA transfers and amb (d/t dizziness), SBA-CGA stairs   PT Total Distance Amb During Session (feet) 80   Physical Therapy Time and Intention   Timed Code Treatment Minutes 57   Total Session Time (sum of timed and untimed services) 67   Psychosocial Support   Trust Relationship/Rapport care  explained;questions answered;questions encouraged

## 2025-05-06 NOTE — PLAN OF CARE
Physical Therapy Discharge Summary    Reason for therapy discharge:    All goals and outcomes met, no further needs identified.    Progress towards therapy goal(s). See goals on Care Plan in Psychiatric electronic health record for goal details.  Goals met    Therapy recommendation(s):    Continued therapy is recommended.  Rationale/Recommendations:  rec home care PT.

## 2025-05-06 NOTE — PLAN OF CARE
Goal Outcome Evaluation:      Plan of Care Reviewed With: patient    Overall Patient Progress: improvingOverall Patient Progress: improving           VS: Temp: 97.8  F (36.6  C) Temp src: Oral BP: 109/77 Pulse: 76   Resp: 16 SpO2: 99 % O2 Device: None (Room air) Oxygen Delivery: 1 LPM     O2: Room air. No report of SOB.   Output: Continent x2. Voiding adequately into commode.   Last BM: 5/5 before surgery   Activity: Asst 2 with GB and walker. Pt walked around the room very well with minimal assistance.   Skin: WDL except L hip incision   Pain: Managed with PRN oxy and tylenol.   CMS: A&Ox4. Denies numbness & tingling.   Dressing: CDI   Diet: Regular diet. Had nausea with IV dilaudid but resolved with Zofran.   LDA: R PIV infusing LR 10mL/hr   Equipment: IV pole, personal belongings, abduction pillow, foot brace, walker   Plan: Discharge today   Additional Info:

## 2025-05-06 NOTE — CONSULTS
"[facility]  Consult Note - Hospitalist Service  Date of Admission:  5/5/2025  Consult Requested by: Orthopedic surgery  Reason for Consult: Medical comanagement    Assessment & Plan   Quiana Shepherd is a 39 year old female admitted on 5/5/2025. She has a history of left hip arthroplasty on 4/9/2025 complicated by left hip dislocation on 2 different occurrences in both instances, requiring reduction in the ER.  Patient presented in today for revision of left RADHA.    # Present history of hip arthroplasty complicated by postoperative hip dislocation  # S/p revision left hip RADHA  -Deferred to pending surgery for recommendations  -PT, OT consultation in the morning  -Multimodal pain control.    # Chronic acne-can resume PTA spironolactone following discharge       Clinically Significant Risk Factors Present on Admission                 # Drug Induced Platelet Defect: home medication list includes an antiplatelet medication             # Overweight: Estimated body mass index is 26.31 kg/m  as calculated from the following:    Height as of this encounter: 1.702 m (5' 7\").    Weight as of this encounter: 76.2 kg (167 lb 15.9 oz).       # Financial/Environmental Concerns:           DORETHA DANG MD  Hospitalist Service  Securely message with [facility] (more info)  Text page via [facility] Paging/Directory   ______________________________________________________________________        History of Present Illness   Quiana Shepherd is a 39 year old female who has a history of acne, recent history of left hip arthroplasty complicated by 2 different occurrences of dislocation requiring reduction in the ER both times.  Patient now presents in for left hip RADHA revision.  Seen in the postop phase, denies any acute complaints at this time.  Patient's main question is regarding posterior hip precautions.  She would like to know the range of motion she needs to adhere to at this current time.  " Denies any chest pain, shortness of breath.  Reports adequate pain control.      Past Medical History    Past Medical History:   Diagnosis Date    Acne vulgaris 10/26/2017    Hip instability, left     Hirsutism     Osteoarthritis of left hip joint due to dysplasia        Past Surgical History   Past Surgical History:   Procedure Laterality Date    ARTHROPLASTY HIP Left 4/9/2025    Procedure: Left total hip arthroplasty;  Surgeon: Jr Mohamud MD;  Location: [facility] OR    BACK SURGERY  1993    Surgery to install halo    ORTHOPEDIC SURGERY  2011    Left hip - TIFFANIE repair       Medications   I have reviewed this patient's current medications  Medications Prior to Admission   Medication Sig Dispense Refill Last Dose/Taking    acetaminophen (TYLENOL) 325 MG tablet Take 2 tablets (650 mg) by mouth every 4 hours as needed for other (mild pain). 100 tablet 0 5/5/2025    aspirin 81 MG EC tablet Take 1 tablet (81 mg) by mouth 2 times daily. 60 tablet 0 5/4/2025 at 10:00 AM    cholecalciferol (VITAMIN D3) 25 mcg (1000 units) capsule Take 1 capsule by mouth every morning.   5/4/2025 Morning    Cyanocobalamin (B-12) 1000 MCG TBCR Take by mouth every morning.   5/4/2025 Morning    senna-docusate (SENOKOT-S/PERICOLACE) 8.6-50 MG tablet Take 1-2 tablets by mouth 2 times daily as needed for constipation. Take while on oral narcotics to prevent or treat constipation. 30 tablet 0 Past Month    spironolactone (ALDACTONE) 50 MG tablet Take 100 mg by mouth every morning.   5/4/2025 Morning    levonorgestrel (MIRENA) 20 MCG/24HR IUD 1 each by Intrauterine route once       oxyCODONE (ROXICODONE) 5 MG tablet Take 1-2 tablets (5-10 mg) by mouth every 4 hours as needed for pain. (Patient taking differently: Take 5-10 mg by mouth every 4 hours as needed for pain.) 30 tablet 0 4/29/2025    oxyCODONE (ROXICODONE) 5 MG tablet Take 1-2 tablets (5-10 mg) by mouth every 4 hours as needed for moderate to severe pain. 30 tablet 0 4/29/2025              Physical Exam   Vital Signs: Temp: 97.9  F (36.6  C) Temp src: Oral BP: 111/73 Pulse: 77   Resp: 18 SpO2: 98 % O2 Device: None (Room air) Oxygen Delivery: 1 LPM  Weight: 167 lbs 15.85 oz    General Appearance: Comfortably in bed in no acute distress or discomfort  Respiratory: Normal work of breathing, clear to auscultation bilaterally  Cardiovascular: Normal S1-S2, normal rate  GI: Abdomen is soft nontender nondistended      Medical Decision Making       45 MINUTES SPENT BY ME on the date of service doing chart review, history, exam, documentation & further activities per the note.      Data   ------------------------- PAST 24 HR DATA REVIEWED -----------------------------------------------        Imaging results reviewed over the past 24 hrs:   Recent Results (from the past 24 hours)   XR Pelvis w Hip Port Left  1 View    Narrative    EXAM: XR PELVIS AND HIP PORTABLE LEFT 1 VIEW  LOCATION: [facility]  DATE: 5/5/2025    INDICATION: Status post Hip surgery  COMPARISON: 4/23/2025      Impression    IMPRESSION: Status post recent left total hip arthroplasty. No immediate hardware complication. Expected postsurgical soft tissue swelling and subcutaneous emphysema. No acute fracture or malalignment.

## 2025-05-07 NOTE — OP NOTE
OPERATIVE REPORT    DATE OF SERVICE: 5/7/25    SURGEON: Jr Mohamud MD.    ASSISTANT(S): Vianey Lauren PA-C The assistance of the Physician Assistant was necessary for positioning, draping, retraction, leg positioning for placement of implants, and layered closure. There was no qualified available resident or fellow who was aware of the intricies of the procedure and requirements of all portions of the procedure.  Mary Ulloa MD    PREOPERATIVE DIAGNOSIS:  left hip instability    POSTOPERATIVE DIAGNOSIS:  left hip instability    OPERATION PERFORMED:  revision arthroplasty left total hip arthroplasty    IMPLANTS:    Implant Name Type Inv. Item Serial No.  Lot No. LRB No. Used Action   IMP LINER S&N ACET R3 XLPE [id number] - [id number] Total Joint Component/Insert IMP LINER S&N ACET R3 XLPE [id number] [id number]  [employer] [id number] Left 1 Implanted   IMP HEAD FEMORAL SNR OXINIUM 12/14 32MM +8 [id number] - [id number] Total Joint Component/Insert IMP HEAD FEMORAL SNR OXINIUM 12/14 32MM +8 [id number]  [employer] [id number] Left 1 Implanted       ANESTHETIC: spinal     OPERATIVE FINDINGS:  After exposing the hip we ranged the joint and found the following: in 90 degrees of flexion the limb was internally rotated to 45 degrees and with further pressure the pelvis rotated and the hip did not dislocate. With 10 degrees of hyper extension and maximal forceful external rotation I could not sublux of dislocate the hip. Maximal hyperextension was excessive, around 35 degrees, and in this position the femoral neck contacted the posterior superior rim of the acetabular component. Additional force in this position did not result in the head riding out the front and I stopped short of maximum force as I was concerned with injuring the knee or fracturing the femur. The hip capsule was healed. We open the capsule and dislocated with a bone hook. The version of the stem was observed and  found to be appropriate. The stem was ingrown. Consideration was given to stopping and not moving the implants but given her post-operative clinical course and the risks that she had already undergone get an anesthetic and to expose the hip we elected to both reduced the version and the abduction of the acetabular component. After moving the component there was no longer anyway to make the posterior hip impinge and the stability examination was identical.      BLOOD LOSS: about 150 cc    COMPLICATIONS:  None apparent    OPERATIVE INDICATIONS:  post-operative instability with two near-complete dislocation requiring reduction in the emergency room under sedation, the first of which was likely out for several days before presenting to the ED and resulting in femoral nerve weakness. We discussed the options including operative and non-operative options. We discussed the option of poly exchange, implant repositioning. We discussed the addition of length from a +4 head to a +8 head and that this could be a noticeable length difference for her. We discussed that changing the liner or the implant positions to decrease the likelihood of anterior instability could simply transfer this risk to posterior instability.  After consideration she stated she wanted to go with extra length in the form of increasing the length through the head by one size (from +4 to +8) and to move implants as was felt to be necessary at the time of surgery.    DESCRIPTION OF THE PROCEDURE:  The patient was identified in the preoperative holding area.  The consent form including the risks and benefits were reviewed with the patient.  The operative limb was identified and marked.  The patient was brought back to the operating room and placed supine on the operating table.  An anesthetic was induced by the anesthesia team.   The patient was placed in the lateral decubitus position and prepped and draped in the normal standard fashion for a hip  "replacement.  A time-out was called.  Antibiotics were given.  We utilized the previous incision and performed a standard posterior approach to the hip. All previous suture encountered were removed. We irrigated with pulse lavage at each level.  The tensor fascia was split.  A small portion of gluteus erika was split in line with its fibers.  The sciatic nerve was palpated.  The east-west retractor was placed.  The posterior border of gluteus medius was exposed and retracted.  The capsular repair was well healed. A stability examination was performed under direct visualization with findings as described above. The decision was made to proceed with revision. The capsule was opened, The hip was dislocated. The position of the femoral stem was examined with findings as above. A Hohmann was placed over the anterior rim of the acetabulum and the femur was subluxed anterior.  A split was made in the inferior capsule.  The transverse acetabular ligament was left intact and used a guide for the anterversion of the acetabular component. This was marked with electrocautery for position check with cup repositioning. Circumferential retractors were placed.  The liner was removed. The screws were removed. The cup was pretty solid. An  handle was threaded into the component. With some effort and few impaction blows the implant loosened enough to reposition. Abduction and version were both slightly decreased using the version bar and the electrocautery leonid as guides. New screw holes were drilled and the screws replaced. A new liner was opened and impacted into place. A +8 head was placed on the cleaned and dried trunion. The stability examination was excellent. With the hip in ninety degrees of flexion and neutral rotation there was greater than 60 degrees of internal rotation before subluxation.  There appropriate movement with a \"luis carlos\" test. The hip was reduced after directly visualizing the entire acetabulum.  The " wound was then irrigated.  The posterior capsule was repaired to the anterior capsule and and short external rotators were sutured to their anatomic attachment on the greater trochanter with non-absorbable suture through bone tunnels.The fascia was closed with interrupted Vicryl, the dermis with interrupted Vicryl, and skin with running monocryl, Dermabond and steri-strips.  At the end of the procedure the sponge and needle counts were correct times two.  The patient tolerated the procedure well and returned to the PAR extubated and stable.    POSTOPERATIVE PLAN:  1. Weight bearing as tolerated  2. Standard posterior hip precautions  3. DVT prophylaxis   4. 24 hours of prophylactic antibiotics plus a week of extended orals due to reoperation in close proximity to index procedure.   5. Follow-up:  Wound clinic in 2 weeks and with [facility] in clinic in 6 weeks for x-rays and a rehabilitation check.

## 2025-05-07 NOTE — DISCHARGE SUMMARY
ORTHOPEDIC SURGERY DISCHARGE SUMMARY     Date of Admission: 5/5/2025  Date of Discharge: 5/6/2025  4:00 PM  Disposition: Home  Staff Physician: No att. providers found  Primary Care Provider: No Ref-Primary, Physician    DISCHARGE DIAGNOSIS:  Hip instability, left [M25.352]    PROCEDURES: Procedure(s):  Left total hip arthroplasty revision on 5/5/2025    BRIEF HISTORY:  This is a 39 year old patient who has been followed in clinic. Please refer to that documentation for full details. Briefly, the patient has a history of left RADHA complicated by two dislocations. The patient has failed conservative treatment of symptoms and desires more definitive intervention. Therefore, after reviewing non-operative and operative options including the risks and benefits associated with each, the patient elected to proceed with the above stated procedure.     HOSPITAL COURSE:    The patient was admitted following the above listed procedures for pain control and rehabilitation. Quiana Shepherd did well post-operatively. Medicine was consulted post operatively to aid in management of medical co-morbidities. The patient received routine nursing cares and at the time of discharge was medically stable. Vital signs were stable throughout admission. The patient is tolerating a regular diet and is voiding spontaneously. All PT/OT goals have been met for safe mobility. Pain is now controlled on oral medications which will be available on discharge. Stool softeners have been used while taking pain medications to help prevent constipation. Quiana Shepherd is deemed medically safe to discharge.     Antibiotics:  Ancef given periop and 24 hours postop.  DVT prophylaxis:  ASA initiated after surgery and will be continued for 4 weeks.  PT Progress:  Has met PT/OT goals for safe mobility.   Pain Meds:  Weaned off all IV pain meds by discharge.  Inpatient Events:  No significant postoperative events or complications.     PHYSICAL EXAM:    Gen: alert  and oriented, responds to questions appropriately  Resp: non-labored on RA  MSK:  LLE:  - Dressings c/d/i  - SILT femoral/tibial/sural/saphenous/DP/SP nerves  - Fires quad, TA, EHL, FHL, GaSC  - DP pulses 2+, foot wwp       FOLLOWUP:    Follow up with Dr. Mohamud team at 2 weeks postoperatively.    Future Appointments   Date Time Provider Department Center   5/22/2025 10:20 AM Jr Mohamud MD [facility]       Orthopedic Surgery appointments are at the [facility] ([address] [place] [address]). Call [phone] to schedule a follow-up appointment at this location with your provider.     PLANNED DISCHARGE ORDERS:      Discharge Medication List as of 5/6/2025  3:35 PM        START taking these medications    Details   cephALEXin (KEFLEX) 500 MG capsule Take 1 capsule (500 mg) by mouth 2 times daily for 10 days., Disp-20 capsule, R-0, E-Prescribe      ondansetron (ZOFRAN ODT) 4 MG ODT tab Take 1 tablet (4 mg) by mouth every 6 hours as needed for nausea or vomiting., Disp-10 tablet, R-0, E-Prescribe      polyethylene glycol (MIRALAX) 17 GM/Dose powder Take 17 g by mouth daily., Disp-510 g, R-0, E-Prescribe           CONTINUE these medications which have CHANGED    Details   acetaminophen (TYLENOL) 325 MG tablet Take 2 tablets (650 mg) by mouth every 4 hours as needed for mild pain., Disp-100 tablet, R-0, E-Prescribe      aspirin (ASA) 81 MG EC tablet Take 1 tablet (81 mg) by mouth 2 times daily., Disp-60 tablet, R-0, E-Prescribe      oxyCODONE (ROXICODONE) 5 MG tablet Take 1-2 tablets (5-10 mg) by mouth every 4 hours as needed for moderate pain., Disp-26 tablet, R-0, E-Prescribe      senna-docusate (SENOKOT-S/PERICOLACE) 8.6-50 MG tablet Take 1 tablet by mouth 2 times daily., Disp-60 tablet, R-0, E-Prescribe           CONTINUE these medications which have NOT CHANGED    Details   cholecalciferol (VITAMIN D3) 25 mcg (1000 units) capsule Take 1 capsule by mouth every morning.,  "Historical      Cyanocobalamin (B-12) 1000 MCG TBCR Take by mouth every morning., Historical      spironolactone (ALDACTONE) 50 MG tablet Take 100 mg by mouth every morning., Historical      levonorgestrel (MIRENA) 20 MCG/24HR IUD 1 each by Intrauterine route onceHistorical               Discharge Procedure Orders   Physical Therapy  Referral   Standing Status: Future   Referral Priority: Routine Referral Type: Rehab Therapy Physical Therapy   Number of Visits Requested: 1     Reason for your hospital stay   Order Comments: Total hip arthroplasty with Dr. Mohamud     When to call - Contact Surgeon Team   Order Comments: You may experience symptoms that require follow-up before your scheduled appointment. Refer to the \"[employer]\" for instructions on when to contact your Surgeon Team if you are concerned about pain control, blood clots, constipation, or if you are unable to urinate.     When to call - Reach out to Urgent Care   Order Comments: If you are not able to reach your Surgeon Team and you need immediate care, go to the Orthopedic Walk-in Clinic or Urgent Care at your Surgeon's office.  Do NOT go to the Emergency Room unless you have shortness of breath, chest pain, or other signs of a medical emergency.     When to call - Reasons to Call 911   Order Comments: Call 911 immediately if you experience sudden-onset chest pain, arm weakness/numbness, slurred speech, or shortness of breath     Discharge Instruction - Breathing exercises   Order Comments: Perform breathing exercises using your Incentive Spirometer 10 times per hour while awake for 2 weeks.     Symptoms - Fever Management   Order Comments: A low grade fever can be expected after surgery.  Use acetaminophen (TYLENOL) as needed for fever management.  Contact your Surgeon Team if you have a fever greater than 101.5 F, chills, and/or night sweats.     Symptoms - Constipation management   Order Comments: Constipation (hard, dry bowel " movements) is expected after surgery due to the combination of being less active, the anesthetic, and the opioid pain medication.  You can do the following to help reduce constipation:  ~  FLUIDS:  Drink clear liquids (water or Gatorade), or juice (apple/prune).  ~  DIET:  Eat a fiber rich diet.    ~  ACTIVITY:  Get up and move around several times a day.  Increase your activity as you are able.  MEDICATIONS:  Reduce the risk of constipation by starting medications before you are constipated.  You can take Miralax   (1 packet as directed) and/or a stool softener (Senokot 1-2 tablets 1-2 times a day).  If you already have constipation and these medications are not working, you can get magnesium citrate and use as directed.  If you continue to have constipation you can try an over the counter suppository or enema.  Call your Surgeon Team if it has been greater than 3 days since your last bowel movement.     Symptoms - Reduced Urine Output   Order Comments: Changes in the amount of fluids you drank before and after surgery may result in problems urinating.  It is important to stay well-hydrated after surgery and drink plenty of water. If it has been greater than 8 hours since you have urinated despite drinking plenty of water, call your Surgeon Team.     Activity - Exercises to prevent blood clots   Order Comments: Unless otherwise directed by your Surgeon team, perform the following exercises at least three times per day for the first four weeks after surgery to prevent blood clots in your legs: 1) Point and flex your feet (Ankle Pumps), 2) Move your ankle around in big circles, 3) Wiggle your toes, 4) Walk, even for short distances, several times a day, will help decrease the risk of blood clots.     Order Specific Question Answer Comments   Is discharge order? Yes      Comfort and Pain Management - Pain after Surgery   Order Comments: Pain after surgery is normal and expected.  You will have some amount of pain for  "several weeks after surgery.  Your pain will improve with time.  There are several things you can do to help reduce your pain including: rest, ice, elevation, and using pain medications as needed. Contact your Surgeon Team if you have pain that persists or worsens after surgery despite rest, ice, elevation, and taking your medication(s) as prescribed. Contact your Surgeon Team if you have new numbness, tingling, or weakness in your operative extremity.     Comfort and Pain Management - Swelling after Surgery   Order Comments: Swelling and/or bruising of the surgical extremity is common and may persist for several months after surgery. In addition to frequent icing and elevation, gentle compressive support with an ACE wrap or tubigrip may help with swelling. Apply compression regularly, removing at least twice daily to perform skin checks. Contact your Surgeon Team if your swelling increases and is NOT associated with an increase in your activity level, or if your swelling increases and is associated with redness and pain.     Comfort and Pain Management - LOWER Extremity Elevation   Order Comments: Swelling is expected for several months after surgery. This type of swelling is usually associated with gravity and activity, and can be improved with elevation.   The best way to do this is to get your \"toes above your nose\" by laying down and placing several pillows lengthwise under your calf and heel. When elevating your leg keep your knee completely straight. Perform this elevation as often as possible especially for the first two weeks after surgery.     Comfort and Pain Management - Cold therapy   Order Comments: Ice can be used to control swelling and discomfort after surgery. Place a thin towel over your operative site and apply the ice pack overtop. Leave ice pack in place for 20 minutes, then remove for 20 minutes. Repeat this 20 minutes on/20 minutes off routine as often as tolerated.     Medication " "Instructions - Acetaminophen (TYLENOL) Instructions   Order Comments: You were discharged with acetaminophen (TYLENOL) for pain management after surgery. Acetaminophen most effectively manages pain symptoms when it is taken on a schedule without missing doses (every four, six, or eight hours). Your Provider will prescribe a safe daily dose between 3000 - 4000 mg.  Do NOT exceed this daily dose. Most patients use acetaminophen for pain control for the first four weeks after surgery.  You can wean from this medication as your pain decreases.     Medication Instructions - NSAID Instructions   Order Comments: You were discharged with an anti-inflammatory medication for pain management to use in combination with acetaminophen (TYLENOL) and the narcotic pain medication.  Take this medication exactly as directed.  You should only take one anti-inflammatory at a time.  Some common anti-inflammatories include: ibuprofen (ADVIL, MOTRIN), naproxen (ALEVE, NAPROSYN), celecoxib (CELEBREX), meloxicam (MOBIC), ketorolac (TORADOL).  Take this medication with food and water.     Follow Up Care   Order Comments: Follow-up with your Surgeon Team in 2 weeks for wound check.     Physical Therapy Instructions   Order Comments: Begin physical therapy within one week following surgery.     Activity - Total Hip Arthroplasty   Order Comments: Refer to the [facility] \"Your Guide to Total Joint Replacement\" for recommendations on activities and Exercises.     Order Specific Question Answer Comments   Is discharge order? Yes      Return to Driving   Order Comments: Return to driving - Driving is NOT permitted until directed by your provider. Under no circumstance are you permitted to drive while using narcotic pain medications.     Order Specific Question Answer Comments   Is discharge order? Yes      Return to athletic activities   Order Comments: Return to athletic activities- Activities such as swimming, bicycling, jogging, running, " and stop-and-go sports should be avoided until permitted by your Provider.     Order Specific Question Answer Comments   Is discharge order? Yes      No flexion past 90 degrees   Order Comments: No bending at waist past 90 degrees.     Order Specific Question Answer Comments   Is discharge order? Yes      No internal rotation   Order Comments: No pivoting on your operative leg.     Order Specific Question Answer Comments   Is discharge order? Yes      No adduction past midline   Order Comments: No crossing your operative leg.     Order Specific Question Answer Comments   Is discharge order? Yes      Weight bearing as tolerated   Order Comments: Weight bearing as tolerated on your operative extremity.     Order Specific Question Answer Comments   Is discharge order? Yes      Dressing / Wound Care - Wound   Order Comments: You have a clean dressing on your surgical wound. Dressing change instructions as follows: dressing will be removed at your follow-up appointment. Contact your Surgeon Team if you have increased redness, warmth around the surgical wound, and/or drainage from the surgical wound.     Dressing Wound Care - Shower with wound/dressing NOT covered   Order Comments: You do not need to cover your dressing or incision in the shower, you may allow water and soap to run over top of the surgical dressing or incision. You may shower when you get home after surgery. You are strictly prohibited from soaking or submerging the surgical wound underwater. Dressing will be removed at your follow up appointment or at the 2 week leonid.     Dressing / Wound Care - NO Tub Bathing   Order Comments: Tub bathing, swimming, or any other activities that will cause your incision to be submerged in water should be avoided until allowed by your Surgeon.     Medication instructions -  Anticoagulation - aspirin   Order Comments: Take the aspirin as prescribed for a total of four weeks after surgery.  This is given to help minimize  your risk of blood clot.     Crutches DME   Order Comments: DME Documentation: Describe the reason for need to support medical necessity: Impaired gait status post hip surgery. I, the undersigned, certify that the above prescribed supplies are medically necessary for this patient and is both reasonable and necessary in reference to accepted standards of medical practice in the treatment of this patient's condition and is not prescribed as a convenience.     Order Specific Question Answer Comments   Medical Equipment (DME) Supplier: [employer]    PATIENT INSTRUCTIONS: If you did not receive this ordered item today, please contact [employer] for availability ([place] Locations: [phone], [place]: [phone]).    Crutch Type: Standard    Crutches Add On: NA    Length of Need: Lifetime      Cane DME   Order Comments: DME Documentation: Describe the reason for need to support medical necessity: Impaired gait status post hip surgery. I, the undersigned, certify that the above prescribed supplies are medically necessary for this patient and is both reasonable and necessary in reference to accepted standards of medical practice in the treatment of this patient's condition and is not prescribed as a convenience.     Order Specific Question Answer Comments   Medical Equipment (DME) Supplier: [employer]    PATIENT INSTRUCTIONS: If you did not receive this ordered item today, please contact [employer] for availability ([place] Locations: [phone], [place]: [phone]).    Cane Type: Single Tip    Reminder: Patient can typically get 1 every 5 years      Walker DME   Order Comments: DME Documentation: Describe the reason for need to support medical necessity: Impaired gait status post hip surgery. I, the undersigned, certify that the above prescribed supplies are medically necessary for this patient and is both reasonable and necessary in  reference to accepted standards of medical practice in the treatment of this patient's condition and is not prescribed as a convenience.     Order Specific Question Answer Comments   Medical Equipment (DME) Supplier: [employer]    PATIENT INSTRUCTIONS: If you did not receive this ordered item today, please contact [employer] for availability ([place] Locations: [phone], [place]: [phone]).    Walker Type: Standard (2 Wheel)    Accessories: N/A      Discharge Instruction - Regular Diet Adult   Order Comments: Return to your pre-surgery diet unless instructed otherwise     Order Specific Question Answer Comments   Is discharge order? Yes        Orthopedic surgery staff for this patient is Dr. Mohamud. Discussed.    --  Mary Ulloa MD  Orthopedic Surgery PGY-4

## 2025-05-22 ENCOUNTER — TELEPHONE (OUTPATIENT)
Dept: ORTHOPEDICS | Facility: CLINIC | Age: 40
End: 2025-05-22

## 2025-05-22 NOTE — TELEPHONE ENCOUNTER
Other: Renee returning call to to Kevin regarding wound care     Could we send this information to you in [facility] or would you prefer to receive a phone call?:   Patient would prefer a phone call   Okay to leave a detailed message?: Yes at Other phone number:  [phone]

## 2025-05-22 NOTE — TELEPHONE ENCOUNTER
Other: Mary with [facility] health calling needs to delay start of physical therapy by a day for patient, any questions call Mary at [phone] okay [facility]     Could we send this information to you in [employer]) or would you prefer to receive a phone call?:   Patient would prefer a phone call   Okay to leave a detailed message?: Yes at Other phone number:  [phone]

## 2025-05-22 NOTE — TELEPHONE ENCOUNTER
Left a voicemail for Renee at [facility] regarding ordering wound care for patient regarding pressure sores. Patient mentioned that she would prefer if wound care come out to her home through [facility] as she is already established. My question to Renee was in regards to ordering if they could take a verbal or if a written order was needed. I left contact information for Renee to call back when she had a moment.     Kevin Draper, CMA

## 2025-05-22 NOTE — TELEPHONE ENCOUNTER
Writer called and left a voice message for Renee at [facility] physical therapy. Kevin from Dr. Mohamud's team is wondering what order verbal or written is needed for wound care through home health.   Writer asked if Renee is not the one to answer that question to leave a voice message with a contact number.       Jessie Mccoy LPN

## 2025-05-28 ENCOUNTER — TELEPHONE (OUTPATIENT)
Dept: ORTHOPEDICS | Facility: CLINIC | Age: 40
End: 2025-05-28
Payer: COMMERCIAL

## 2025-05-28 NOTE — TELEPHONE ENCOUNTER
Order(s): Other:   Reason for requested: Michael sent orders for medications to Dr. Mohamud, about 5 of them, to add to their med list.  A request for a signature on this was last sent over on 5/16.  They are calling as they have not received this back.     Date needed: asap  Provider name: Dr. Mohamud    Could we send this information to you in [employer] or would you prefer to receive a phone call?:   Patient would prefer a phone call   Okay to leave a detailed message?: Yes at Other phone number:  Mary with [employer] is calling from .

## 2025-05-28 NOTE — TELEPHONE ENCOUNTER
Renee was called back and verbal order were give as OK to start left knee ROM.      This information was left in a message.

## 2025-05-28 NOTE — TELEPHONE ENCOUNTER
Other: Renee([facility]) wondering if it is okay to work on left knee range of motion. Also left knee gets tight while sitting. Please call Renee back.     Could we send this information to you in [employer] or would you prefer to receive a phone call?:   Patient would prefer a phone call   Okay to leave a detailed message?: Yes at Other phone number:  [phone] *

## 2025-05-29 ENCOUNTER — DOCUMENTATION ONLY (OUTPATIENT)
Dept: ORTHOPEDICS | Facility: CLINIC | Age: 40
End: 2025-05-29
Payer: COMMERCIAL

## 2025-05-29 NOTE — TELEPHONE ENCOUNTER
Left message for Mray Rodriguez informing her that orders for medications were signed today by Dr. Mohamud.     Kevin Draper CMA

## 2025-05-29 NOTE — PROGRESS NOTES
Received Completed forms Yes   Faxed Forms Faxed To: [facility]  Fax Number: [phone]   Sent to [facility] (Date) 5/29/25

## 2025-05-30 ENCOUNTER — TELEPHONE (OUTPATIENT)
Dept: ORTHOPEDICS | Facility: CLINIC | Age: 40
End: 2025-05-30
Payer: COMMERCIAL

## 2025-05-30 NOTE — TELEPHONE ENCOUNTER
Form or Letter   Type or form/letter needing completion: [facility] Order for therapy--Mary indicated that although verbal orders were given she still needs the signed order for therapy sent over. Must be on the [facility] form please.    Provider: Cade Otero form needed: as soon as able  Once completed: Send form to Michael    Could we send this information to you in [employer] or would you prefer to receive a phone call?:   Call Mary with questions   Okay to leave a detailed message?: Yes at Other phone number:  [phone]*

## 2025-06-02 ENCOUNTER — THERAPY VISIT (OUTPATIENT)
Dept: PHYSICAL THERAPY | Facility: CLINIC | Age: 40
End: 2025-06-02
Payer: COMMERCIAL

## 2025-06-02 DIAGNOSIS — T84.021A DISLOCATION OF INTERNAL LEFT HIP PROSTHESIS, INITIAL ENCOUNTER: ICD-10-CM

## 2025-06-02 DIAGNOSIS — Z96.642 AFTERCARE FOLLOWING LEFT HIP JOINT REPLACEMENT SURGERY: ICD-10-CM

## 2025-06-02 DIAGNOSIS — M25.552 HIP PAIN, LEFT: Primary | ICD-10-CM

## 2025-06-02 DIAGNOSIS — Z47.1 AFTERCARE FOLLOWING LEFT HIP JOINT REPLACEMENT SURGERY: ICD-10-CM

## 2025-06-02 PROCEDURE — 97530 THERAPEUTIC ACTIVITIES: CPT | Mod: GP | Performed by: PHYSICAL THERAPIST

## 2025-06-02 PROCEDURE — 97112 NEUROMUSCULAR REEDUCATION: CPT | Mod: GP | Performed by: PHYSICAL THERAPIST

## 2025-06-02 PROCEDURE — 97161 PT EVAL LOW COMPLEX 20 MIN: CPT | Mod: GP | Performed by: PHYSICAL THERAPIST

## 2025-06-02 ASSESSMENT — ACTIVITIES OF DAILY LIVING (ADL)
PUTTING ON SOCKS AND SHOES: UNABLE TO DO
JUMPING: UNABLE TO DO
STEPPING_UP_AND_DOWN_CURBS: MODERATE DIFFICULTY
WALKING_15_MINUTES_OR_GREATER: MODERATE DIFFICULTY
GETTING_INTO_AND_OUT_OF_AN_AVERAGE_CAR: UNABLE TO DO
SWINGING_OBJECTS_LIKE_A_GOLF_CLUB: UNABLE TO DO
GOING_UP_1_FLIGHT_OF_STAIRS: SLIGHT DIFFICULTY
LIGHT_TO_MODERATE_WORK: SLIGHT DIFFICULTY
ADL_COUNT: 17
HOW_WOULD_YOU_RATE_YOUR_CURRENT_LEVEL_OF_FUNCTION_DURING_YOUR_SPORTS_RELATED_ACTIVITIES_FROM_0_TO_100_WITH_100_BEING_YOUR_LEVEL_OF_FUNCTION_PRIOR_TO_YOUR_HIP_PROBLEM_AND_0_BEING_THE_INABILITY_TO_PERFORM_ANY_OF_YOUR_USUAL_DAILY_ACTIVITIES?: 0
SPORTS_HIGHEST_POTENTIAL_SCORE: 36
GETTING_INTO_AND_OUT_OF_A_BATHTUB: UNABLE TO DO
LIGHT_TO_MODERATE_WORK: SLIGHT DIFFICULTY
WALKING_UP_STEEP_HILLS: EXTREME DIFFICULTY
RECREATIONAL ACTIVITIES: UNABLE TO DO
ADL_SCORE(%): 0
GETTING_INTO_AND_OUT_OF_A_BATHTUB: UNABLE TO DO
TWISTING/PIVOTING ON INVOLVED LEG: UNABLE TO DO
GOING UP 1 FLIGHT OF STAIRS: SLIGHT DIFFICULTY
RECREATIONAL_ACTIVITIES: UNABLE TO DO
GOING DOWN 1 FLIGHT OF STAIRS: SLIGHT DIFFICULTY
GETTING INTO AND OUT OF AN AVERAGE CAR: UNABLE TO DO
HOS_ADL_ITEM_SCORE_TOTAL: 23
PUTTING_ON_SOCKS_AND_SHOES: UNABLE TO DO
PLEASE_INDICATE_YOR_PRIMARY_REASON_FOR_REFERRAL_TO_THERAPY:: HIP
ADL_TOTAL_ITEM_SCORE: 0
ABILITY_TO_PERFORM_ACTIVITY_WITH_YOUR_NORMAL_TECHNIQUE: UNABLE TO DO
WALKING_15_MINUTES_OR_GREATER: MODERATE DIFFICULTY
SPORTS_TOTAL_ITEM_SCORE: 0
HOW_WOULD_YOU_RATE_YOUR_CURRENT_LEVEL_OF_FUNCTION_DURING_YOUR_USUAL_ACTIVITIES_OF_DAILY_LIVING_FROM_0_TO_100_WITH_100_BEING_YOUR_LEVEL_OF_FUNCTION_PRIOR_TO_YOUR_HIP_PROBLEM_AND_0_BEING_THE_INABILITY_TO_PERFORM_ANY_OF_YOUR_USUAL_DAILY_ACTIVITIES?: 20
WALKING_INITIALLY: SLIGHT DIFFICULTY
RUNNING_ONE_MILE: UNABLE TO DO
HOS_ADL_SCORE(%): 33.82
GOING_DOWN_1_FLIGHT_OF_STAIRS: SLIGHT DIFFICULTY
SPORTS_COUNT: 9
WALKING_DOWN_STEEP_HILLS: EXTREME DIFFICULTY
WALKING_INITIALLY: SLIGHT DIFFICULTY
DEEP_SQUATTING: UNABLE TO DO
SITTING FOR 15 MINUTES: SLIGHT DIFFICULTY
HOW_WOULD_YOU_RATE_YOUR_CURRENT_LEVEL_OF_FUNCTION_DURING_YOUR_USUAL_ACTIVITIES_OF_DAILY_LIVING_FROM_0_TO_100_WITH_100_BEING_YOUR_LEVEL_OF_FUNCTION_PRIOR_TO_YOUR_HIP_PROBLEM_AND_0_BEING_THE_INABILITY_TO_PERFORM_ANY_OF_YOUR_USUAL_DAILY_ACTIVITIES?: 20
WALKING_DOWN_STEEP_HILLS: EXTREME DIFFICULTY
LOW_IMPACT_ACTIVITIES_LIKE_FAST_WALKING: UNABLE TO DO
STEPPING UP AND DOWN CURBS: MODERATE DIFFICULTY
SITTING_FOR_15_MINUTES: SLIGHT DIFFICULTY
TWISTING/PIVOTING_ON_INVOLVED_LEG: UNABLE TO DO
ADL_HIGHEST_POTENTIAL_SCORE: 68
STANDING FOR 15 MINUTES: MODERATE DIFFICULTY
ABILITY_TO_PARTICIPATE_IN_YOUR_DESIRED_SPORT_AS_LONG_AS_YOU_WOULD_LIKE: UNABLE TO DO
WALKING_FOR_APPROXIMATELY_10_MINUTES: SLIGHT DIFFICULTY
CUTTING/LATERAL_MOVEMENTS: UNABLE TO DO
WALKING_UP_STEEP_HILLS: EXTREME DIFFICULTY
ROLLING OVER IN BED: UNABLE TO DO
HOS_ADL_HIGHEST_POTENTIAL_SCORE: 68
HEAVY_WORK: UNABLE TO DO
HEAVY_WORK: UNABLE TO DO
LANDING: UNABLE TO DO
ROLLING_OVER_IN_BED: UNABLE TO DO
DEEP SQUATTING: UNABLE TO DO
STANDING_FOR_15_MINUTES: MODERATE DIFFICULTY
SPORTS_SCORE(%): 0
HOW_WOULD_YOU_RATE_YOUR_CURRENT_LEVEL_OF_FUNCTION?: SEVERELY ABNORMAL
WALKING_APPROXIMATELY_10_MINUTES: SLIGHT DIFFICULTY
STARTING_AND_STOPPING_QUICKLY: UNABLE TO DO

## 2025-06-02 NOTE — PROGRESS NOTES
PHYSICAL THERAPY EVALUATION  Type of Visit: Evaluation     Therapist Impression:   Patient purchsed [employer] home unit for quad activation.  Focus on this and follow up in 1-2 weeks.      NEXT: Reassess quad activation    PTRX: online    GOALS: walking    Fall Risk Screen:  Have you fallen 2 or more times in the past year?: No  Have you fallen and had an injury in the past year?: No    Subjective   Had been getting a lot of swelling in knee and is using tape.        Presenting condition or subjective complaint: RADHA rehab  Date of onset: 05/05/25    Relevant medical history: Implanted device; Osteoarthritis; Significant weakness   Dates & types of surgery: TIFFANIE repair - 1/2010; L RADHA - 4/9/2025; L RADHA Revision - 5/5/2025    Prior diagnostic imaging/testing results: CT scan; X-ray     Prior therapy history for the same diagnosis, illness or injury: Yes 5/6/25 at-home PT    Prior Level of Function  Transfers: Independent  Ambulation: Independent  ADL: Independent  IADL:     Living Environment  Social support: With a significant other or spouse   Type of home: House; 2-story; Basement   Stairs to enter the home: Yes 3 Is there a railing: Yes     Ramp: No   Stairs inside the home: Yes 13 Is there a railing: Yes     Help at home: Self Cares (home health aide/personal care attendant, family, etc); Home management tasks (cooking, cleaning); Home and Yard maintenance tasks; Assist for driving and community activities  Equipment owned: Straight Cane; Walker; Crutches; Raised toilet seat; Bath bench; Dressing equipment     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  The following significant findings have been identified: Pain, Decreased ROM/flexibility, Decreased strength, Impaired gait, Impaired muscle performance, Decreased activity tolerance, and Impaired posture. These impairments interfere with their ability to perform self care tasks, work tasks, recreational activities, and household chores as compared to previous level of function.     Clinical Decision Making (Complexity):  Clinical Presentation: Stable/Uncomplicated  Clinical Presentation Rationale: based on medical and personal factors listed in PT evaluation  Clinical Decision Making (Complexity): Low complexity    PLAN OF CARE  Treatment Interventions:  Modalities: Cupping  Interventions: Manual Therapy, Neuromuscular Re-education, Therapeutic Activity, Therapeutic Exercise, Self-Care/Home Management    Long Term Goals     PT Goal 1  Goal Identifier: Walking  Goal Description: Be able to walk 15-20 min without AD  Rationale: to maximize safety and independence with performance of ADLs and functional tasks;to maximize safety and independence within the home;to maximize safety and independence within the community  Target Date: 08/30/25      Frequency of Treatment: 1 x week  Duration of Treatment: 12 weeks    Recommended Referrals to Other Professionals:   Education Assessment:        Risks and benefits of evaluation/treatment have been explained.   Patient/Family/caregiver agrees with Plan of Care.     Evaluation Time:     PT Eval, Low Complexity Minutes (96425): 12       Signing Clinician: Femi Vela PT

## 2025-06-02 NOTE — TELEPHONE ENCOUNTER
Tried to call 2 times, call was answered but could not hear anyone on the line.   Will try again later.  Roxane Robertson RN

## 2025-06-02 NOTE — TELEPHONE ENCOUNTER
Called and talked with Mary, she had 2 forms that need to be filled. She will email over the other form.  Roxane Robertson RN

## 2025-06-09 ENCOUNTER — TELEPHONE (OUTPATIENT)
Dept: ORTHOPEDICS | Facility: CLINIC | Age: 40
End: 2025-06-09

## 2025-06-09 NOTE — TELEPHONE ENCOUNTER
Other: Mary from [facility] is calling to see if Dr. Mohamud has received an order they sent over that needs to be signed and faxed back. Please call to let her know     Could we send this information to you in [place] or would you prefer to receive a phone call?:   Patient would prefer a phone call   Okay to leave a detailed message?: Yes at Other phone number:  [phone] Mary

## 2025-06-12 ENCOUNTER — MEDICAL CORRESPONDENCE (OUTPATIENT)
Dept: HEALTH INFORMATION MANAGEMENT | Facility: CLINIC | Age: 40
End: 2025-06-12
Payer: COMMERCIAL

## 2025-06-19 ENCOUNTER — OFFICE VISIT (OUTPATIENT)
Dept: ORTHOPEDICS | Facility: CLINIC | Age: 40
End: 2025-06-19
Payer: COMMERCIAL

## 2025-06-19 DIAGNOSIS — G89.18 ACUTE POSTOPERATIVE PAIN: Primary | ICD-10-CM

## 2025-06-19 ASSESSMENT — HOOS JR
BENDING TO THE FLOOR TO PICK UP OBJECT: SEVERE
LYING IN BED (TURNING OVER, MAINTAINING HIP POSITION): MODERATE
SITTING: MILD
WALKING ON UNEVEN SURFACE: MILD
GOING UP OR DOWN STAIRS: MILD
RISING FROM SITTING: MILD
HOOS JR TOTAL INTERVAL SCORE: 61.82

## 2025-06-19 NOTE — PROGRESS NOTES
Chief Complaint: RECHECK (DOS: 5/5/2025 - Left total hip arthroplasty revision )         HPI: Quiana Shepherd returns today in follow-up for her left hip. She reports that things are going well. She has been working with both out pt and home PT. She has transitioned to a cane and is walking about a half mile. She reports improvement in her quads now with a strong knee extension. Is using a shoe lift on the right but also feels like the legs are feeling more equal over time.      Current Status:  HOOS Jr:61.82  [facility]:  Global Mental Health Score - (Patient-Rptd) (P) 13  Global Physical Health Score - (Patient-Rptd) (P) 15  PROMIS TOTAL - SUBSCORES - (Patient-Rptd) (P) 28  Medications and allergies are documented in the EMR and have been reviewed.      Current Outpatient Medications:     acetaminophen (TYLENOL) 325 MG tablet, Take 2 tablets (650 mg) by mouth every 4 hours as needed for mild pain., Disp: 100 tablet, Rfl: 0    aspirin (ASA) 81 MG EC tablet, Take 1 tablet (81 mg) by mouth 2 times daily., Disp: 60 tablet, Rfl: 0    cholecalciferol (VITAMIN D3) 25 mcg (1000 units) capsule, Take 1 capsule by mouth every morning., Disp: , Rfl:     Cyanocobalamin (B-12) 1000 MCG TBCR, Take by mouth every morning., Disp: , Rfl:     levonorgestrel (MIRENA) 20 MCG/24HR IUD, 1 each by Intrauterine route once, Disp: , Rfl:     ondansetron (ZOFRAN ODT) 4 MG ODT tab, Take 1 tablet (4 mg) by mouth every 6 hours as needed for nausea or vomiting., Disp: 10 tablet, Rfl: 0    oxyCODONE (ROXICODONE) 5 MG tablet, Take 1 tablet (5 mg) by mouth every 4 hours as needed for severe pain., Disp: 25 tablet, Rfl: 0    oxyCODONE (ROXICODONE) 5 MG tablet, Take 1-2 tablets (5-10 mg) by mouth every 4 hours as needed for moderate pain., Disp: 26 tablet, Rfl: 0    polyethylene glycol (MIRALAX) 17 GM/Dose powder, Take 17 g by mouth daily., Disp: 510 g, Rfl: 0    senna-docusate (SENOKOT-S/PERICOLACE) 8.6-50 MG tablet, Take 1 tablet by mouth 2 times daily.,  Disp: 60 tablet, Rfl: 0    spironolactone (ALDACTONE) 50 MG tablet, Take 100 mg by mouth every morning., Disp: , Rfl:     Allergies: Contrast dye        Physical Exam:  On physical examination the patient appears the stated age, is in no acute distress, affect is appropriate, and breathing is non-labored.    There were no vitals taken for this visit.  There is no height or weight on file to calculate BMI.    Rises from chair:easily.   Gait: walks rapid  Incision is healed and benign.   Strong knee extension left in sitting position  Normal and symmetric sciatic motor.    We reviewed the radiographs together. These show the normal progression for a total hip arthroplasty without evidence of loosening or subsidence. Legs lengths appear equal on today's radiographs.       Assessment: advancing well s/p revision. Radiographs look appropriate, stable, and equal in leg length. Significant quads function recovery is encourging.     Plan: continue with physical therapy. RTC in 6 weeks. Sooner if issues.   Referred Self

## 2025-06-19 NOTE — LETTER
6/19/2025      Quiana Shepherd  [address] [facility] [address]      Dear Colleague,    Thank you for referring your patient, Quiana Shepherd, to the [facility] [place]. Please see a copy of my visit note below.    Chief Complaint: RECHECK (DOS: 5/5/2025 - Left total hip arthroplasty revision )         HPI: Quiana Shepherd returns today in follow-up for her left hip. She reports that things are going well. She has been working with both out pt and home PT. She has transitioned to a cane and is walking about a half mile. She reports improvement in her quads now with a strong knee extension. Is using a shoe lift on the right but also feels like the legs are feeling more equal over time.      Current Status:  HOOS Jr:61.82  [facility]:  Global Mental Health Score - (Patient-Rptd) (P) 13  Global Physical Health Score - (Patient-Rptd) (P) 15  PROMIS TOTAL - SUBSCORES - (Patient-Rptd) (P) 28  Medications and allergies are documented in the EMR and have been reviewed.      Current Outpatient Medications:      acetaminophen (TYLENOL) 325 MG tablet, Take 2 tablets (650 mg) by mouth every 4 hours as needed for mild pain., Disp: 100 tablet, Rfl: 0     aspirin (ASA) 81 MG EC tablet, Take 1 tablet (81 mg) by mouth 2 times daily., Disp: 60 tablet, Rfl: 0     cholecalciferol (VITAMIN D3) 25 mcg (1000 units) capsule, Take 1 capsule by mouth every morning., Disp: , Rfl:      Cyanocobalamin (B-12) 1000 MCG TBCR, Take by mouth every morning., Disp: , Rfl:      levonorgestrel (MIRENA) 20 MCG/24HR IUD, 1 each by Intrauterine route once, Disp: , Rfl:      ondansetron (ZOFRAN ODT) 4 MG ODT tab, Take 1 tablet (4 mg) by mouth every 6 hours as needed for nausea or vomiting., Disp: 10 tablet, Rfl: 0     oxyCODONE (ROXICODONE) 5 MG tablet, Take 1 tablet (5 mg) by mouth every 4 hours as needed for severe pain., Disp: 25 tablet, Rfl: 0     oxyCODONE (ROXICODONE) 5 MG tablet, Take 1-2 tablets (5-10 mg) by mouth every  4 hours as needed for moderate pain., Disp: 26 tablet, Rfl: 0     polyethylene glycol (MIRALAX) 17 GM/Dose powder, Take 17 g by mouth daily., Disp: 510 g, Rfl: 0     senna-docusate (SENOKOT-S/PERICOLACE) 8.6-50 MG tablet, Take 1 tablet by mouth 2 times daily., Disp: 60 tablet, Rfl: 0     spironolactone (ALDACTONE) 50 MG tablet, Take 100 mg by mouth every morning., Disp: , Rfl:     Allergies: Contrast dye        Physical Exam:  On physical examination the patient appears the stated age, is in no acute distress, affect is appropriate, and breathing is non-labored.    There were no vitals taken for this visit.  There is no height or weight on file to calculate BMI.    Rises from chair:easily.   Gait: walks rapid  Incision is healed and benign.   Strong knee extension left in sitting position  Normal and symmetric sciatic motor.    We reviewed the radiographs together. These show the normal progression for a total hip arthroplasty without evidence of loosening or subsidence. Legs lengths appear equal on today's radiographs.       Assessment: advancing well s/p revision. Radiographs look appropriate, stable, and equal in leg length. Significant quads function recovery is encourging.     Plan: continue with physical therapy. RTC in 6 weeks. Sooner if issues.   Referred Self      Again, thank you for allowing me to participate in the care of your patient.        Sincerely,        Jr Mohamud MD    Electronically signed

## 2025-06-19 NOTE — NURSING NOTE
Reason For Visit:   Chief Complaint   Patient presents with    RECHECK     DOS: 5/5/2025 - Left total hip arthroplasty revision        There were no vitals taken for this visit.    Pain Assessment  Patient Currently in Pain: Yes  0-10 Pain Scale: 2  Primary Pain Location: Hip (Left)    Kevin Draper CMA

## 2025-06-23 ENCOUNTER — THERAPY VISIT (OUTPATIENT)
Dept: PHYSICAL THERAPY | Facility: CLINIC | Age: 40
End: 2025-06-23
Payer: COMMERCIAL

## 2025-06-23 DIAGNOSIS — T84.021A DISLOCATION OF INTERNAL LEFT HIP PROSTHESIS, INITIAL ENCOUNTER: ICD-10-CM

## 2025-06-23 DIAGNOSIS — Z47.1 AFTERCARE FOLLOWING LEFT HIP JOINT REPLACEMENT SURGERY: ICD-10-CM

## 2025-06-23 DIAGNOSIS — Z96.642 AFTERCARE FOLLOWING LEFT HIP JOINT REPLACEMENT SURGERY: ICD-10-CM

## 2025-06-23 DIAGNOSIS — M25.552 HIP PAIN, LEFT: Primary | ICD-10-CM

## 2025-06-23 PROCEDURE — 97530 THERAPEUTIC ACTIVITIES: CPT | Mod: GP | Performed by: PHYSICAL THERAPIST

## 2025-06-23 PROCEDURE — 97110 THERAPEUTIC EXERCISES: CPT | Mod: GP | Performed by: PHYSICAL THERAPIST

## 2025-06-23 NOTE — PROGRESS NOTES
Therapist Impression:   Great improvement in quad strength/activation.  Continue to progress general hip strengthening.  Unable to transfer/roll onto side.  Good improvement in gait mechanics.    NEXT: step ups in future, SLR abd in future    PTRX: online    GOALS: walking    Subjective:  Things are going pretty well.  Pain is 2/10 max.  Started walking outside.  Up to 0.7 miles. Knee tightness.  Still gets swelling in knee as the day goes on.  Not using ice pack as much.    Objective:  Gait: with cane, good mechnics     Hip ROM Flexion ER (supine) IR (supine)   Left 60 na na   Right na na na     Good quad activation

## 2025-07-07 ENCOUNTER — THERAPY VISIT (OUTPATIENT)
Dept: PHYSICAL THERAPY | Facility: CLINIC | Age: 40
End: 2025-07-07
Payer: COMMERCIAL

## 2025-07-07 DIAGNOSIS — Z47.1 AFTERCARE FOLLOWING LEFT HIP JOINT REPLACEMENT SURGERY: ICD-10-CM

## 2025-07-07 DIAGNOSIS — Z96.642 AFTERCARE FOLLOWING LEFT HIP JOINT REPLACEMENT SURGERY: ICD-10-CM

## 2025-07-07 DIAGNOSIS — T84.021A DISLOCATION OF INTERNAL LEFT HIP PROSTHESIS, INITIAL ENCOUNTER: ICD-10-CM

## 2025-07-07 DIAGNOSIS — M25.552 HIP PAIN, LEFT: Primary | ICD-10-CM

## 2025-07-07 PROCEDURE — 97530 THERAPEUTIC ACTIVITIES: CPT | Mod: GP | Performed by: PHYSICAL THERAPIST

## 2025-07-07 PROCEDURE — 97110 THERAPEUTIC EXERCISES: CPT | Mod: GP | Performed by: PHYSICAL THERAPIST

## 2025-07-07 NOTE — PROGRESS NOTES
Therapist Impression:   Able to roll.  Progressed to sidelying ER and eccentric step downs.   Unable to perform good quality step ups.  Discussed BFR and will start next session.    NEXT: step ups in future, SLR abd in future    PTRX: online    GOALS: walking    Subjective:  Adjusting heel height to less is less.  Up to 2.06 miles.  Took an hour and 15 minutes.      Objective:  Gait: with cane, good mechnics     Hip ROM Flexion ER (supine) IR (supine)   Left 70 na na   Right na na na     Good quad activation  Unable to get up into table top position.

## 2025-07-12 ENCOUNTER — HEALTH MAINTENANCE LETTER (OUTPATIENT)
Age: 40
End: 2025-07-12

## 2025-07-14 ENCOUNTER — THERAPY VISIT (OUTPATIENT)
Dept: PHYSICAL THERAPY | Facility: CLINIC | Age: 40
End: 2025-07-14
Payer: COMMERCIAL

## 2025-07-14 DIAGNOSIS — M25.552 HIP PAIN, LEFT: Primary | ICD-10-CM

## 2025-07-14 DIAGNOSIS — Z96.642 AFTERCARE FOLLOWING LEFT HIP JOINT REPLACEMENT SURGERY: ICD-10-CM

## 2025-07-14 DIAGNOSIS — T84.021A DISLOCATION OF INTERNAL LEFT HIP PROSTHESIS, INITIAL ENCOUNTER: ICD-10-CM

## 2025-07-14 DIAGNOSIS — Z47.1 AFTERCARE FOLLOWING LEFT HIP JOINT REPLACEMENT SURGERY: ICD-10-CM

## 2025-07-14 PROCEDURE — 97530 THERAPEUTIC ACTIVITIES: CPT | Mod: GP | Performed by: PHYSICAL THERAPIST

## 2025-07-14 PROCEDURE — 97110 THERAPEUTIC EXERCISES: CPT | Mod: GP | Performed by: PHYSICAL THERAPIST

## 2025-07-14 NOTE — PROGRESS NOTES
Therapist Impression:   Introduced BFR today.  Will increase weights to start to 5 plates.    NEXT: step ups in future    PTRX: online    GOALS: walking    Subjective:  Walking without cane in house.  Still walking about 2 miles.  Combination of walking and exercises is too much.  Got bike  set up.  Biking for 5 min felt good.   Was able to do SLR on side.    Objective:  Gait: without cane, good mechnics  NOTES/NEXT:    Date  7/14 Limb Occlusion Pressure  Unable to assess Percent (%) Occlusion  NA Training Occlusion Pressure  125 Exercises Performed  2.75 pl, 3.3, 4, 5 pl x x 15 each   Total time under tourniquet  6:30   Immediate effects  6/10 Lingering effects (from previous session)  NA   Blood Flow Restriction Training: Contraindications and precautions reviewed, pt safe for use of modality, Risks and benefits discussed; pt gave informed consent. MD gave permission

## 2025-07-21 ENCOUNTER — THERAPY VISIT (OUTPATIENT)
Dept: PHYSICAL THERAPY | Facility: CLINIC | Age: 40
End: 2025-07-21
Payer: COMMERCIAL

## 2025-07-21 DIAGNOSIS — Z96.642 AFTERCARE FOLLOWING LEFT HIP JOINT REPLACEMENT SURGERY: ICD-10-CM

## 2025-07-21 DIAGNOSIS — M25.552 HIP PAIN, LEFT: Primary | ICD-10-CM

## 2025-07-21 DIAGNOSIS — Z47.1 AFTERCARE FOLLOWING LEFT HIP JOINT REPLACEMENT SURGERY: ICD-10-CM

## 2025-07-21 DIAGNOSIS — T84.021A DISLOCATION OF INTERNAL LEFT HIP PROSTHESIS, INITIAL ENCOUNTER: ICD-10-CM

## 2025-07-21 PROCEDURE — 97530 THERAPEUTIC ACTIVITIES: CPT | Mod: GP | Performed by: PHYSICAL THERAPIST

## 2025-07-21 PROCEDURE — 97110 THERAPEUTIC EXERCISES: CPT | Mod: GP | Performed by: PHYSICAL THERAPIST

## 2025-07-21 NOTE — PROGRESS NOTES
Therapist Impression:   Introduced bridging with BFR.  Added in abdominal marching.  Great progress with hip ROM    NEXT: step ups in future    PTRX: online    GOALS: walking    Subjective:  No issues after BFR last time.  Did the walk without a cane for 1 mile except for inclines and declines.      Objective:  Gait: without cane, good mechanics  Hip flexion to 90 degrees    NOTES/NEXT:    Date  7/21 Limb Occlusion Pressure  Unable to assess Percent (%) Occlusion  NA Training Occlusion Pressure  125 Exercises Performed  Double leg leg press SH 3 4, 5, 6, 7 pl x 15 each  Bridge 4 x 15 Total time under tourniquet  7   Immediate effects  8/10 Lingering effects (from previous session)  NA   Blood Flow Restriction Training: Contraindications and precautions reviewed, pt safe for use of modality, Risks and benefits discussed; pt gave informed consent. MD gave permission

## 2025-07-28 ENCOUNTER — THERAPY VISIT (OUTPATIENT)
Dept: PHYSICAL THERAPY | Facility: CLINIC | Age: 40
End: 2025-07-28
Payer: COMMERCIAL

## 2025-07-28 DIAGNOSIS — T84.021A DISLOCATION OF INTERNAL LEFT HIP PROSTHESIS, INITIAL ENCOUNTER: ICD-10-CM

## 2025-07-28 DIAGNOSIS — M25.552 HIP PAIN, LEFT: Primary | ICD-10-CM

## 2025-07-28 DIAGNOSIS — Z96.642 STATUS POST TOTAL REPLACEMENT OF LEFT HIP: Primary | ICD-10-CM

## 2025-07-28 DIAGNOSIS — G89.18 ACUTE POSTOPERATIVE PAIN: ICD-10-CM

## 2025-07-28 DIAGNOSIS — Z96.642 AFTERCARE FOLLOWING LEFT HIP JOINT REPLACEMENT SURGERY: ICD-10-CM

## 2025-07-28 DIAGNOSIS — Z47.1 AFTERCARE FOLLOWING LEFT HIP JOINT REPLACEMENT SURGERY: ICD-10-CM

## 2025-07-28 PROCEDURE — 97530 THERAPEUTIC ACTIVITIES: CPT | Mod: GP | Performed by: PHYSICAL THERAPIST

## 2025-07-28 PROCEDURE — 97110 THERAPEUTIC EXERCISES: CPT | Mod: GP | Performed by: PHYSICAL THERAPIST

## 2025-07-28 NOTE — PROGRESS NOTES
PLAN  Continue therapy per current plan of care.    Beginning/End Dates of Progress Note Reporting Period:  06/02/25 to 07/28/2025    Referring Provider:  Vianey Lauren    Therapist Impression:   Quiana is doing great.  She is progressing nicely with strength, ROM, and function.  We are progressing to modified planks and bridging progressions.    NEXT: unstable surface training/balance    PTRX: online    GOALS: hiking, and biking    Subjective:  1.63 miles without a cane.  Did not use for inclines or declines.  Walked 3.16 miles with cane the next day with 2 stops.  Doing UE strengthening.    Objective:  Gait: without cane, good mechanics  Hip flexion to 90 degrees  Able to perform SLR    NOTES/NEXT:    Date  7/28 Limb Occlusion Pressure  Unable to assess Percent (%) Occlusion  NA Training Occlusion Pressure  125 Exercises Performed  Double leg leg press SH 3 5.5, 6.75 7.75, 8 pl x 15 each  Bridge 4 x 15 Total time under tourniquet  6    6:30   Immediate effects  8/10    6/10 Lingering effects (from previous session)  NA   Blood Flow Restriction Training: Contraindications and precautions reviewed, pt safe for use of modality, Risks and benefits discussed; pt gave informed consent. MD gave permission

## 2025-07-30 ASSESSMENT — HOOS JR
RISING FROM SITTING: MILD
WALKING ON UNEVEN SURFACE: MILD
LYING IN BED (TURNING OVER, MAINTAINING HIP POSITION): MILD
HOOS JR TOTAL INTERVAL SCORE: 80.56

## 2025-07-31 ENCOUNTER — OFFICE VISIT (OUTPATIENT)
Dept: ORTHOPEDICS | Facility: CLINIC | Age: 40
End: 2025-07-31
Payer: COMMERCIAL

## 2025-07-31 DIAGNOSIS — Z96.642 STATUS POST TOTAL REPLACEMENT OF LEFT HIP: Primary | ICD-10-CM

## 2025-07-31 NOTE — LETTER
7/31/2025      Quiana Shepherd  [address] [facility] [address]      Dear Colleague,    Thank you for referring your patient, Quiana Shepherd, to the [facility] [place]. Please see a copy of my visit note below.    11 weeks revision left total hip    Subjective: Quiana returns today for her hip. Reports that things are going well. No cane today and has walked 1-2 miles without it. Improving well in physical therapy, is doing BFR. Stairs now going well. Increasing strength in her quads left. [place] Questions about hip positions in various activities.     Objective: rises easily from a chair and walks with a normal appearing gait. Strong quads L. Sens to light touch improved will decreased compared to normal medial thigh.      Assessment: Really doing well with sig improvement since last visit. Discussed activities, swimming, hip positions. All the patient's questions were answered to the best of my ability.     Plan cont with PT RTC one year from surgery or sooner if issues.         Again, thank you for allowing me to participate in the care of your patient.        Sincerely,        Jr Mohamud MD    Electronically signed

## 2025-07-31 NOTE — NURSING NOTE
Reason For Visit:   Chief Complaint   Patient presents with    RECHECK     DOS: 5/5/2025 - Left total hip arthroplasty revision        There were no vitals taken for this visit.         Guerita Quevedo, ATC

## 2025-07-31 NOTE — PROGRESS NOTES
11 weeks revision left total hip    Subjective: Quiana returns today for her hip. Reports that things are going well. No cane today and has walked 1-2 miles without it. Improving well in physical therapy, is doing BFR. Stairs now going well. Increasing strength in her quads left. [place] Questions about hip positions in various activities.     Objective: rises easily from a chair and walks with a normal appearing gait. Strong quads L. Sens to light touch improved will decreased compared to normal medial thigh.      Assessment: Really doing well with sig improvement since last visit. Discussed activities, swimming, hip positions. All the patient's questions were answered to the best of my ability.     Plan cont with PT RTC one year from surgery or sooner if issues.

## 2025-08-04 ENCOUNTER — THERAPY VISIT (OUTPATIENT)
Dept: PHYSICAL THERAPY | Facility: CLINIC | Age: 40
End: 2025-08-04
Payer: COMMERCIAL

## 2025-08-04 DIAGNOSIS — M25.552 HIP PAIN, LEFT: Primary | ICD-10-CM

## 2025-08-04 DIAGNOSIS — Z96.642 AFTERCARE FOLLOWING LEFT HIP JOINT REPLACEMENT SURGERY: ICD-10-CM

## 2025-08-04 DIAGNOSIS — T84.021A DISLOCATION OF INTERNAL LEFT HIP PROSTHESIS, INITIAL ENCOUNTER: ICD-10-CM

## 2025-08-04 DIAGNOSIS — Z47.1 AFTERCARE FOLLOWING LEFT HIP JOINT REPLACEMENT SURGERY: ICD-10-CM

## 2025-08-04 PROCEDURE — 97530 THERAPEUTIC ACTIVITIES: CPT | Mod: GP | Performed by: PHYSICAL THERAPIST

## 2025-08-04 PROCEDURE — 97110 THERAPEUTIC EXERCISES: CPT | Mod: GP | Performed by: PHYSICAL THERAPIST

## 2025-08-18 ENCOUNTER — THERAPY VISIT (OUTPATIENT)
Dept: PHYSICAL THERAPY | Facility: CLINIC | Age: 40
End: 2025-08-18
Payer: COMMERCIAL

## 2025-08-18 DIAGNOSIS — M25.552 HIP PAIN, LEFT: Primary | ICD-10-CM

## 2025-08-18 DIAGNOSIS — Z47.1 AFTERCARE FOLLOWING LEFT HIP JOINT REPLACEMENT SURGERY: ICD-10-CM

## 2025-08-18 DIAGNOSIS — Z96.642 AFTERCARE FOLLOWING LEFT HIP JOINT REPLACEMENT SURGERY: ICD-10-CM

## 2025-08-18 DIAGNOSIS — T84.021A DISLOCATION OF INTERNAL LEFT HIP PROSTHESIS, INITIAL ENCOUNTER: ICD-10-CM

## 2025-08-18 PROCEDURE — 97530 THERAPEUTIC ACTIVITIES: CPT | Mod: GP | Performed by: PHYSICAL THERAPIST

## 2025-08-18 PROCEDURE — 97110 THERAPEUTIC EXERCISES: CPT | Mod: GP | Performed by: PHYSICAL THERAPIST

## 2025-08-27 ENCOUNTER — THERAPY VISIT (OUTPATIENT)
Dept: PHYSICAL THERAPY | Facility: CLINIC | Age: 40
End: 2025-08-27
Payer: COMMERCIAL

## 2025-08-27 DIAGNOSIS — M25.552 HIP PAIN, LEFT: Primary | ICD-10-CM

## 2025-08-27 DIAGNOSIS — Z96.642 AFTERCARE FOLLOWING LEFT HIP JOINT REPLACEMENT SURGERY: ICD-10-CM

## 2025-08-27 DIAGNOSIS — Z47.1 AFTERCARE FOLLOWING LEFT HIP JOINT REPLACEMENT SURGERY: ICD-10-CM

## 2025-08-27 DIAGNOSIS — T84.021A DISLOCATION OF INTERNAL LEFT HIP PROSTHESIS, INITIAL ENCOUNTER: ICD-10-CM

## 2025-08-27 PROCEDURE — 97110 THERAPEUTIC EXERCISES: CPT | Mod: GP | Performed by: PHYSICAL THERAPIST

## 2025-08-27 PROCEDURE — 97530 THERAPEUTIC ACTIVITIES: CPT | Mod: GP | Performed by: PHYSICAL THERAPIST

## (undated) DEVICE — PACK TOTAL HIP W/POUCH RIVERSIDE LATEX FREE

## (undated) DEVICE — SUCTION MANIFOLD NEPTUNE 2 SYS 4 PORT 0702-020-000

## (undated) DEVICE — LINEN MAYO STAND COVER OVERSIZE PACK 5458

## (undated) DEVICE — GLOVE BIOGEL PI MICRO INDICATOR UNDERGLOVE SZ 8.0 48980

## (undated) DEVICE — PREP CHLORAPREP 26ML TINTED HI-LITE ORANGE 930815

## (undated) DEVICE — DRAPE STERI TOWEL LG 1010

## (undated) DEVICE — ESU GROUND PAD ADULT W/CORD E7507

## (undated) DEVICE — DEVICE RETRIEVER HEWSON 71111579

## (undated) DEVICE — DRSG STERI STRIP 1/2X4" R1547

## (undated) DEVICE — BLADE SAW RECIP STRK 70X6X0.025MM 0277-096-250S5

## (undated) DEVICE — SOL WATER IRRIG 1000ML BOTTLE 2F7114

## (undated) DEVICE — DRSG DRAIN 4X4" 7086

## (undated) DEVICE — GOWN IMPERVIOUS SPECIALTY XLG/XLONG 32474

## (undated) DEVICE — SU VICRYL+ 0 CT 36" DYED VCP358H

## (undated) DEVICE — POSITIONER ABDUCTION PILLOW FOAM MED APG 202

## (undated) DEVICE — STRAP STIRRUP W/SLIP 30187-030

## (undated) DEVICE — DRSG KERLIX 4 1/2"X4YDS ROLL 6730

## (undated) DEVICE — POSITIONER ABDUCTION PILLOW FOAM MED FP-ABDUCTM

## (undated) DEVICE — SOLUTION IV 0.9% NACL 1000ML E8000

## (undated) DEVICE — SU VICRYL 2-0 CT-1 27" UND J259H

## (undated) DEVICE — DRILL BIT FLEXIBLE REFLECTION 35MM 71362935

## (undated) DEVICE — SU ETHIBOND 5 V-37 4X30" MB66G

## (undated) DEVICE — LINEN TOWEL PACK X5 5464

## (undated) DEVICE — DRSG TEGADERM 4X10" 1627

## (undated) DEVICE — SUCTION IRR SYSTEM W/O TIP INTERPULSE HANDPIECE 0210-100-000

## (undated) DEVICE — BLADE KNIFE SURG 20 371120

## (undated) DEVICE — LINEN BACK PACK 5440

## (undated) DEVICE — SOLUTION WATER 1000ML R5000-01

## (undated) DEVICE — DRSG SILVERCEL 4.25X4.25" 900404

## (undated) DEVICE — HOOD SURG T7PLUS PEEL AWAY FACE SHIELD STRL LF 0416-801-100

## (undated) DEVICE — BONE CLEANING TIP INTERPULSE  0210-010-000

## (undated) DEVICE — SU DERMABOND ADVANCED .7ML DNX12

## (undated) DEVICE — SOL NACL 0.9% IRRIG 1000ML BOTTLE 2F7124

## (undated) DEVICE — GLOVE BIOGEL PI SZ 7.5 40875

## (undated) DEVICE — DRAPE IOBAN INCISE 36X23" 6651EZ

## (undated) DEVICE — STRAP KNEE/BODY 31143004

## (undated) DEVICE — SU VICRYL+ 2-0 CT 36IN UND VCP957H

## (undated) DEVICE — SU MONOCRYL 3-0 PS-1 27" Y936H

## (undated) DEVICE — SOLUTION IRRIGATION 0.9% NACL 1000ML R5200-01

## (undated) DEVICE — VIAL DECANTER STERILE WHITE DYNJDEC06

## (undated) DEVICE — SPONGE LAP 18X18" X8435

## (undated) DEVICE — STRAP POSITIONING 60X31" BODY KNEE KBS 01

## (undated) RX ORDER — HYDROMORPHONE HYDROCHLORIDE 1 MG/ML
INJECTION, SOLUTION INTRAMUSCULAR; INTRAVENOUS; SUBCUTANEOUS
Status: DISPENSED
Start: 2025-04-09

## (undated) RX ORDER — CEFAZOLIN SODIUM/WATER 2 G/20 ML
SYRINGE (ML) INTRAVENOUS
Status: DISPENSED
Start: 2025-04-09

## (undated) RX ORDER — OXYCODONE HYDROCHLORIDE 5 MG/1
TABLET ORAL
Status: DISPENSED
Start: 2025-05-05

## (undated) RX ORDER — DEXAMETHASONE SODIUM PHOSPHATE 4 MG/ML
INJECTION, SOLUTION INTRA-ARTICULAR; INTRALESIONAL; INTRAMUSCULAR; INTRAVENOUS; SOFT TISSUE
Status: DISPENSED
Start: 2025-04-09

## (undated) RX ORDER — ACETAMINOPHEN 325 MG/1
TABLET ORAL
Status: DISPENSED
Start: 2025-04-09

## (undated) RX ORDER — ACETAMINOPHEN 325 MG/1
TABLET ORAL
Status: DISPENSED
Start: 2025-05-05

## (undated) RX ORDER — BUPIVACAINE HYDROCHLORIDE 2.5 MG/ML
INJECTION, SOLUTION EPIDURAL; INFILTRATION; INTRACAUDAL
Status: DISPENSED
Start: 2021-07-12

## (undated) RX ORDER — TRANEXAMIC ACID 650 MG/1
TABLET ORAL
Status: DISPENSED
Start: 2025-05-05

## (undated) RX ORDER — PROPOFOL 10 MG/ML
INJECTION, EMULSION INTRAVENOUS
Status: DISPENSED
Start: 2025-05-05

## (undated) RX ORDER — TRIAMCINOLONE ACETONIDE 40 MG/ML
INJECTION, SUSPENSION INTRA-ARTICULAR; INTRAMUSCULAR
Status: DISPENSED
Start: 2024-01-05

## (undated) RX ORDER — HYDROMORPHONE HYDROCHLORIDE 1 MG/ML
INJECTION, SOLUTION INTRAMUSCULAR; INTRAVENOUS; SUBCUTANEOUS
Status: DISPENSED
Start: 2025-05-05

## (undated) RX ORDER — ONDANSETRON 2 MG/ML
INJECTION INTRAMUSCULAR; INTRAVENOUS
Status: DISPENSED
Start: 2025-04-09

## (undated) RX ORDER — TRANEXAMIC ACID 650 MG/1
TABLET ORAL
Status: DISPENSED
Start: 2025-04-09

## (undated) RX ORDER — GLYCOPYRROLATE 0.2 MG/ML
INJECTION, SOLUTION INTRAMUSCULAR; INTRAVENOUS
Status: DISPENSED
Start: 2025-04-09

## (undated) RX ORDER — LIDOCAINE HYDROCHLORIDE 10 MG/ML
INJECTION, SOLUTION EPIDURAL; INFILTRATION; INTRACAUDAL; PERINEURAL
Status: DISPENSED
Start: 2024-01-05

## (undated) RX ORDER — FENTANYL CITRATE 50 UG/ML
INJECTION, SOLUTION INTRAMUSCULAR; INTRAVENOUS
Status: DISPENSED
Start: 2025-05-05

## (undated) RX ORDER — OXYCODONE HYDROCHLORIDE 5 MG/1
TABLET ORAL
Status: DISPENSED
Start: 2025-04-09

## (undated) RX ORDER — TRIAMCINOLONE ACETONIDE 40 MG/ML
INJECTION, SUSPENSION INTRA-ARTICULAR; INTRAMUSCULAR
Status: DISPENSED
Start: 2024-05-30

## (undated) RX ORDER — FENTANYL CITRATE-0.9 % NACL/PF 10 MCG/ML
PLASTIC BAG, INJECTION (ML) INTRAVENOUS
Status: DISPENSED
Start: 2025-05-05

## (undated) RX ORDER — TRIAMCINOLONE ACETONIDE 40 MG/ML
INJECTION, SUSPENSION INTRA-ARTICULAR; INTRAMUSCULAR
Status: DISPENSED
Start: 2023-10-10

## (undated) RX ORDER — EPHEDRINE SULFATE 50 MG/ML
INJECTION, SOLUTION INTRAMUSCULAR; INTRAVENOUS; SUBCUTANEOUS
Status: DISPENSED
Start: 2025-04-09

## (undated) RX ORDER — LIDOCAINE HYDROCHLORIDE 10 MG/ML
INJECTION, SOLUTION EPIDURAL; INFILTRATION; INTRACAUDAL; PERINEURAL
Status: DISPENSED
Start: 2024-05-30

## (undated) RX ORDER — TRIAMCINOLONE ACETONIDE 40 MG/ML
INJECTION, SUSPENSION INTRA-ARTICULAR; INTRAMUSCULAR
Status: DISPENSED
Start: 2021-07-12

## (undated) RX ORDER — FENTANYL CITRATE 50 UG/ML
INJECTION, SOLUTION INTRAMUSCULAR; INTRAVENOUS
Status: DISPENSED
Start: 2025-04-09

## (undated) RX ORDER — CEFAZOLIN SODIUM/WATER 2 G/20 ML
SYRINGE (ML) INTRAVENOUS
Status: DISPENSED
Start: 2025-05-05

## (undated) RX ORDER — ONDANSETRON 2 MG/ML
INJECTION INTRAMUSCULAR; INTRAVENOUS
Status: DISPENSED
Start: 2025-05-05

## (undated) RX ORDER — EPHEDRINE SULFATE 50 MG/ML
INJECTION, SOLUTION INTRAMUSCULAR; INTRAVENOUS; SUBCUTANEOUS
Status: DISPENSED
Start: 2025-05-05

## (undated) RX ORDER — LIDOCAINE HYDROCHLORIDE 10 MG/ML
INJECTION, SOLUTION EPIDURAL; INFILTRATION; INTRACAUDAL; PERINEURAL
Status: DISPENSED
Start: 2023-10-10

## (undated) RX ORDER — DEXAMETHASONE SODIUM PHOSPHATE 4 MG/ML
INJECTION, SOLUTION INTRA-ARTICULAR; INTRALESIONAL; INTRAMUSCULAR; INTRAVENOUS; SOFT TISSUE
Status: DISPENSED
Start: 2025-05-05

## (undated) RX ORDER — LIDOCAINE HYDROCHLORIDE 10 MG/ML
INJECTION, SOLUTION EPIDURAL; INFILTRATION; INTRACAUDAL; PERINEURAL
Status: DISPENSED
Start: 2021-07-12